# Patient Record
Sex: FEMALE | Race: WHITE | NOT HISPANIC OR LATINO | Employment: OTHER | URBAN - METROPOLITAN AREA
[De-identification: names, ages, dates, MRNs, and addresses within clinical notes are randomized per-mention and may not be internally consistent; named-entity substitution may affect disease eponyms.]

---

## 2018-02-13 ENCOUNTER — PROCEDURE VISIT CONVERTED (OUTPATIENT)
Dept: PODIATRY | Facility: CLINIC | Age: 58
End: 2018-02-13
Attending: PODIATRIST

## 2018-02-13 ENCOUNTER — CONVERSION ENCOUNTER (OUTPATIENT)
Dept: PODIATRY | Facility: CLINIC | Age: 58
End: 2018-02-13

## 2018-04-24 ENCOUNTER — CONVERSION ENCOUNTER (OUTPATIENT)
Dept: PODIATRY | Facility: CLINIC | Age: 58
End: 2018-04-24

## 2018-04-24 ENCOUNTER — PROCEDURE VISIT CONVERTED (OUTPATIENT)
Dept: PODIATRY | Facility: CLINIC | Age: 58
End: 2018-04-24
Attending: PODIATRIST

## 2018-07-11 ENCOUNTER — PROCEDURE VISIT CONVERTED (OUTPATIENT)
Dept: PODIATRY | Facility: CLINIC | Age: 58
End: 2018-07-11
Attending: PODIATRIST

## 2018-08-15 ENCOUNTER — CONVERSION ENCOUNTER (OUTPATIENT)
Dept: GASTROENTEROLOGY | Facility: CLINIC | Age: 58
End: 2018-08-15

## 2018-08-15 ENCOUNTER — OFFICE VISIT CONVERTED (OUTPATIENT)
Dept: GASTROENTEROLOGY | Facility: CLINIC | Age: 58
End: 2018-08-15
Attending: NURSE PRACTITIONER

## 2019-01-23 ENCOUNTER — PROCEDURE VISIT CONVERTED (OUTPATIENT)
Dept: PODIATRY | Facility: CLINIC | Age: 59
End: 2019-01-23
Attending: PODIATRIST

## 2019-08-29 ENCOUNTER — CONVERSION ENCOUNTER (OUTPATIENT)
Dept: FAMILY MEDICINE CLINIC | Facility: CLINIC | Age: 59
End: 2019-08-29

## 2019-08-29 ENCOUNTER — OFFICE VISIT CONVERTED (OUTPATIENT)
Dept: FAMILY MEDICINE CLINIC | Facility: CLINIC | Age: 59
End: 2019-08-29
Attending: FAMILY MEDICINE

## 2020-09-21 ENCOUNTER — OFFICE VISIT CONVERTED (OUTPATIENT)
Dept: FAMILY MEDICINE CLINIC | Facility: CLINIC | Age: 60
End: 2020-09-21
Attending: FAMILY MEDICINE

## 2020-09-22 ENCOUNTER — HOSPITAL ENCOUNTER (OUTPATIENT)
Dept: GENERAL RADIOLOGY | Facility: HOSPITAL | Age: 60
Discharge: HOME | End: 2020-09-22
Attending: FAMILY MEDICINE

## 2020-09-22 LAB
25(OH)D3 SERPL-MCNC: 43.3 NG/ML (ref 30–100)
ALBUMIN SERPL-MCNC: 4.1 G/DL (ref 3.5–5)
ALBUMIN/GLOB SERPL: 1.3 {RATIO} (ref 1.4–2.6)
ALP SERPL-CCNC: 86 U/L (ref 53–141)
ALT SERPL-CCNC: 14 U/L (ref 10–40)
ANION GAP SERPL CALC-SCNC: 16 MMOL/L (ref 8–19)
AST SERPL-CCNC: 38 U/L (ref 15–50)
BASOPHILS # BLD AUTO: 0.04 10*3/UL (ref 0–0.2)
BASOPHILS NFR BLD AUTO: 0.8 % (ref 0–3)
BILIRUB SERPL-MCNC: 0.3 MG/DL (ref 0.2–1.3)
BUN SERPL-MCNC: 18 MG/DL (ref 5–25)
BUN/CREAT SERPL: 16 {RATIO} (ref 6–20)
CALCIUM SERPL-MCNC: 9.1 MG/DL (ref 8.7–10.4)
CHLORIDE SERPL-SCNC: 97 MMOL/L (ref 99–111)
CHOLEST SERPL-MCNC: 197 MG/DL (ref 107–200)
CHOLEST/HDLC SERPL: 2.7 {RATIO} (ref 3–6)
CONV ABS IMM GRAN: 0.01 10*3/UL (ref 0–0.2)
CONV CO2: 25 MMOL/L (ref 22–32)
CONV IMMATURE GRAN: 0.2 % (ref 0–1.8)
CONV TOTAL PROTEIN: 7.2 G/DL (ref 6.3–8.2)
CREAT UR-MCNC: 1.14 MG/DL (ref 0.5–0.9)
DEPRECATED RDW RBC AUTO: 45.3 FL (ref 36.4–46.3)
EOSINOPHIL # BLD AUTO: 0.22 10*3/UL (ref 0–0.7)
EOSINOPHIL # BLD AUTO: 4.6 % (ref 0–7)
ERYTHROCYTE [DISTWIDTH] IN BLOOD BY AUTOMATED COUNT: 12.8 % (ref 11.7–14.4)
FOLATE SERPL-MCNC: >20 NG/ML (ref 4.8–20)
GFR SERPLBLD BASED ON 1.73 SQ M-ARVRAT: 52 ML/MIN/{1.73_M2}
GLOBULIN UR ELPH-MCNC: 3.1 G/DL (ref 2–3.5)
GLUCOSE SERPL-MCNC: 91 MG/DL (ref 65–99)
HCT VFR BLD AUTO: 42 % (ref 37–47)
HDLC SERPL-MCNC: 73 MG/DL (ref 40–60)
HGB BLD-MCNC: 13.4 G/DL (ref 12–16)
IRON SATN MFR SERPL: 23 % (ref 20–55)
IRON SERPL-MCNC: 66 UG/DL (ref 60–170)
LDLC SERPL CALC-MCNC: 112 MG/DL (ref 70–100)
LYMPHOCYTES # BLD AUTO: 0.68 10*3/UL (ref 1–5)
LYMPHOCYTES NFR BLD AUTO: 14.2 % (ref 20–45)
MCH RBC QN AUTO: 31.3 PG (ref 27–31)
MCHC RBC AUTO-ENTMCNC: 31.9 G/DL (ref 33–37)
MCV RBC AUTO: 98.1 FL (ref 81–99)
MONOCYTES # BLD AUTO: 0.33 10*3/UL (ref 0.2–1.2)
MONOCYTES NFR BLD AUTO: 6.9 % (ref 3–10)
NEUTROPHILS # BLD AUTO: 3.52 10*3/UL (ref 2–8)
NEUTROPHILS NFR BLD AUTO: 73.3 % (ref 30–85)
NRBC CBCN: 0 % (ref 0–0.7)
OSMOLALITY SERPL CALC.SUM OF ELEC: 277 MOSM/KG (ref 273–304)
PLATELET # BLD AUTO: 208 10*3/UL (ref 130–400)
PMV BLD AUTO: 9.8 FL (ref 9.4–12.3)
POTASSIUM SERPL-SCNC: 4.6 MMOL/L (ref 3.5–5.3)
RBC # BLD AUTO: 4.28 10*6/UL (ref 4.2–5.4)
SODIUM SERPL-SCNC: 133 MMOL/L (ref 135–147)
T4 FREE SERPL-MCNC: 0.7 NG/DL (ref 0.9–1.8)
TIBC SERPL-MCNC: 285 UG/DL (ref 245–450)
TRANSFERRIN SERPL-MCNC: 199 MG/DL (ref 250–380)
TRIGL SERPL-MCNC: 60 MG/DL (ref 40–150)
TSH SERPL-ACNC: 1.53 M[IU]/L (ref 0.27–4.2)
VIT B12 SERPL-MCNC: 342 PG/ML (ref 211–911)
VLDLC SERPL-MCNC: 12 MG/DL (ref 5–37)
WBC # BLD AUTO: 4.8 10*3/UL (ref 4.8–10.8)

## 2021-01-25 ENCOUNTER — HOSPITAL ENCOUNTER (OUTPATIENT)
Dept: OTHER | Facility: HOSPITAL | Age: 61
Discharge: HOME | End: 2021-01-25
Attending: INTERNAL MEDICINE

## 2021-02-18 ENCOUNTER — HOSPITAL ENCOUNTER (OUTPATIENT)
Dept: VACCINE CLINIC | Facility: HOSPITAL | Age: 61
Discharge: HOME | End: 2021-02-18
Attending: INTERNAL MEDICINE

## 2021-04-14 ENCOUNTER — OFFICE VISIT CONVERTED (OUTPATIENT)
Dept: FAMILY MEDICINE CLINIC | Facility: CLINIC | Age: 61
End: 2021-04-14
Attending: NURSE PRACTITIONER

## 2021-04-14 ENCOUNTER — HOSPITAL ENCOUNTER (OUTPATIENT)
Dept: GENERAL RADIOLOGY | Facility: HOSPITAL | Age: 61
Discharge: HOME | End: 2021-04-14
Attending: NURSE PRACTITIONER

## 2021-04-20 ENCOUNTER — OFFICE VISIT CONVERTED (OUTPATIENT)
Dept: ORTHOPEDIC SURGERY | Facility: CLINIC | Age: 61
End: 2021-04-20
Attending: ORTHOPAEDIC SURGERY

## 2021-05-04 ENCOUNTER — CONVERSION ENCOUNTER (OUTPATIENT)
Dept: ORTHOPEDIC SURGERY | Facility: CLINIC | Age: 61
End: 2021-05-04

## 2021-05-11 NOTE — H&P
History and Physical      Patient Name: Gela Michaud   Patient ID: [id number]   Sex: Female   YOB: 1960    Primary Care Provider: Tonie Duran DO   Referring Provider: Tonie Duran DO    Visit Date: April 20, 2021    Provider: Avelino Reardon MD   Location: [facility]   Location Address: [address] [place] [address]  [id number]   Location Phone: [phone]          Chief Complaint  · Left Hand Injury      History Of Present Illness  Gela Michaud is a 61 year old /White female who presents today to [facility].      Patient presents today for an evaluation of left hand. Patient reports she had fallen when trying to getting on the transportation bus resulting in her injuring her left hand/wrist. Patient injured her wrist/hand on 4/13/21. She is present today with her adult foster daughter. Her adult foster daughter brought her to her primary care and obtained x-rays. The foster daughter states that the patient wasn't provided with a splint or wrap so she wrapped her hand herself.       Past Medical History  Abdominal bloating; Anemia; Anxiety; Foot pain, left; Foot pain, right; High blood pressure; High cholesterol; Hyperthyroidism; Ingrowing toenail; Mental Retardation; Mood disorder; Neurologic disorder; Seizure; Thyroid Problems; Tinea unguium         Past Surgical History  Colonoscopy; Endoscopy; Hysterectomy-Abdominal         Medication List  Celexa 20 mg oral tablet; Cogentin 2 mg/2 mL injection solution;  MG CAPS 100 CAP; FERROUS GLUCONATE 324 MG  (38 FE) Tablet; folic acid 1 mg oral tablet; Haldol 10 mg Oral; iron 325 mg (65 mg iron) oral tablet; Lipitor 20 mg oral tablet; lisinopril 5 mg oral tablet; Miralax 17 gram/dose oral powder; OMEPRAZOLE 20 MG CPDR 20 Capsule; Oscal + D3 500 mg; Oyster Shell Calcium 500 mg calcium (1,250 mg) oral tablet; POLYETHYLENE GLYCOL 3350 PO Powder; Senokot 8.6 mg oral tablet; Trileptal 300 mg oral tablet;  "Tylenol 8 Hour 650 mg oral tablet extended release; Vitamin B-12 1,000 mcg oral tablet; vitamin B12-folic acid 1,000-400 mcg sublingual lozenge; Vitamin D3 25 mcg (1,000 unit) oral tablet; Zyprexa 5 mg oral tablet         Allergy List  Ativan       Allergies Reconciled  Family Medical History  -Father's Family History Unknown; -Mother's Family History Unknown         Social History  Alcohol (Never); Caffeine (Current some day); Lives with; Second hand smoke exposure (Never); Tobacco (Never)         Immunizations  Name Date Admin   Influenza 09/21/2020   Influenza Refused   Influenza Refused         Review of Systems  · Constitutional  o Denies  o : fever, chills, weight loss  · Cardiovascular  o Denies  o : chest pain, shortness of breath  · Gastrointestinal  o Denies  o : liver disease, heartburn, nausea, blood in stools  · Genitourinary  o Denies  o : painful urination, blood in urine  · Integument  o Denies  o : rash, itching  · Neurologic  o Denies  o : headache, weakness, loss of consciousness  · Musculoskeletal  o Denies  o : painful, swollen joints  · Psychiatric  o Denies  o : drug/alcohol addiction, anxiety, depression      Vitals  Date Time BP Position Site L\R Cuff Size HR RR TEMP (F) WT  HT  BMI kg/m2 BSA m2 O2 Sat FR L/min FiO2 HC       04/20/2021 08:18 AM      76 - R   175lbs 16oz 5'  4\" 30.21 1.9 99 %            Physical Examination  · Constitutional  o Appearance  o : well developed, well-nourished, no obvious deformities present  · Head and Face  o Head  o :   § Inspection  § : normocephalic  o Face  o :   § Inspection  § : no facial lesions  · Eyes  o Conjunctivae  o : conjunctivae normal  o Sclerae  o : sclerae white  · Ears, Nose, Mouth and Throat  o Ears  o :   § External Ears  § : appearance within normal limits  § Hearing  § : intact  o Nose  o :   § External Nose  § : appearance normal  · Neck  o Inspection/Palpation  o : normal appearance  o Range of Motion  o : full range of " motion  · Respiratory  o Respiratory Effort  o : breathing unlabored  o Inspection of Chest  o : normal appearance  o Auscultation of Lungs  o : no audible wheezing or rales  · Cardiovascular  o Heart  o : regular rate  · Gastrointestinal  o Abdominal Examination  o : soft and non-tender  · Skin and Subcutaneous Tissue  o General Inspection  o : intact, no rashes  · Psychiatric  o General  o : Alert and oriented x3  o Judgement and Insight  o : judgment and insight intact  o Mood and Affect  o : mood normal, affect appropriate  · Left Wrist  o Inspection  o : Swelling. Sensation grossly intact. Neurovascular intact. Skin intact. No skin discoloration or atrophy. Brisk capillary refill. Non-tender elbow. Radial pulse 2+, ulnar pulse 2+. No angulation.   · Casting  o Extremity  o : Left Hand, Short Arm Cast  o Procedure  o : Closed treatment was obtained and fiberglass cast was applied. The patient tolerated the procedure without any complications  · Imaging  o Imaging  o : 4/14/21 X-RAY [LEFT HAND/WRIST] 1. Nondisplaced fractures of the index and middle finger metacarpal necks. 2. Nondisplaced fracture at the ulnar and dorsal base of the thumb proximal phalanx. 3. Tiny calcification at the ulnar distal aspect of the ulnar styloid, possible small avulsion fracture fragment.           Assessment  · Left 2nd, 3rd and thumb metacarpal fracture     815.00  · Arthralgia of left hand     719.44/M25.542      Plan  · Orders  o Casting Supplies (Short Arm) Adult () - - 04/20/2021   NB  o Application of short arm cast (37144) - - 04/20/2021   NB  · Medications  o Medications have been Reconciled  o Transition of Care or Provider Policy  · Instructions  o Dr. Reardon saw and examined the patient and agrees with plan.   o X-rays reviewed by Dr. Reardon.  o Reviewed the patient's Past Medical, Social, and Family history as well as the ROS at today's visit, no changes.  o Call or return if worsening symptoms.  o Follow Up in 2  weeks.  o Discussed treatment plans and diagnosis with the patient. Patient was placed into a cast. Patient and daughter educated on cast care. Patient will get repeat films next visit.   o The above service was scribed by Nancie Tracey on my behalf and I attest to the accuracy of the note. mc            Electronically Signed by: Nancie Tracey-, Other -Author on April 22, 2021 10:58:18 AM  Electronically Co-signed by: Avelino Reardon MD -Reviewer on April 22, 2021 10:19:46 PM

## 2021-05-13 NOTE — PROGRESS NOTES
Progress Note      Patient Name: Gela Michaud   Patient ID: [id number]   Sex: Female   YOB: 1960    Primary Care Provider: Tonie Duran DO   Referring Provider: Tonie Duran DO    Visit Date: September 21, 2020    Provider: Tonie Duran DO   Location: [facility]   Location Address: [address] [place] [address] [address]  [place], [place]  [id number]   Location Phone: [phone]          Chief Complaint  · follow up   · physical       History Of Present Illness  Gela Michaud is a 60 year old /White female who presents for evaluation and treatment of:      She presents today for a follow-up for the management of her chronic medical conditions.  She is accompanied by Yas her caregiver.  The patient has MMR.  She also has a past medical history significant for anemia, anxiety, hyperlipidemia, hyperthyroidism, mood disorder and history of seizures.    She is current on her pap smears.     Her last colonoscopy was in 2015 with no issues.    Mammogram is due.    She sees Dr Chisholm for her anemia. Her most recent hemoglobin was over 12.      She has no complaints today.     She is managed by [facility] in [facility] for her psych needs.      She has no other complaints today and denies CP, SOB, cough, wheeze, fatigue, N/V/D, dizziness or syncope.            Past Medical History  Disease Name Date Onset Notes   Abdominal bloating --  --    Anemia --  --    Anxiety --  --    Foot pain, left --  --    Foot pain, right --  --    High blood pressure --  --    High cholesterol --  --    Hyperthyroidism --  --    Ingrowing toenail --  --    Mental Retardation --  --    Mood disorder --  --    Neurologic disorder --  --    Seizure --  --    Thyroid Problems --  --    Tinea unguium --  --          Past Surgical History  Procedure Name Date Notes   Colonoscopy 2018 --    Endoscopy 2018 --    Hysterectomy-Abdominal --  --          Medication List  Name Date Started  Instructions   Celexa 20 mg oral tablet 01/07/2020 take 1 tablet by oral route 2 times a day for 30 days   Cogentin 2 mg/2 mL injection solution  inject 1 milliliter (1 mg) by intramuscular route once daily   Colace 100 mg oral capsule 06/05/2020 take 1 capsule by oral route 2 times a day for 90 days   ferrous gluconate 324 mg (38 mg iron) oral tablet 09/03/2020 take 1 tablet by oral route daily for 90 days   folic acid 1 mg oral tablet  take 1 tablet (1 mg) by oral route once daily   Haldol 10 mg Oral  1 tab 2 times per day   iron 325 mg (65 mg iron) oral tablet  take 1 tablet (325 mg) by oral route 2 times per day   Lipitor 20 mg oral tablet 09/03/2020 take 1 tablet by oral route daily for 90 days   lisinopril 5 mg oral tablet 09/03/2020 take 2 tablets (10 mg) by oral route once daily for 90 days   Miralax 17 gram/dose oral powder 05/22/2020 take 17 gram mixed with 8 oz. water, juice, soda, coffee or tea by oral route once daily for 30 days   omeprazole 20 mg oral capsule,delayed release(DR/EC) 03/02/2020 take 1 capsule (20 mg) by oral route once daily before a meal for 90 days   Oscal + D3 500 mg  take one tab daily   Oyster Shell Calcium 500 mg calcium (1,250 mg) oral tablet 09/03/2020 take 1 tablet by oral route once   Senokot 8.6 mg oral tablet 05/08/2020 take 1 tablet by oral route twice   Synthroid 50 mcg oral tablet 09/23/2020 Take 1 tab po every day.   Trileptal 300 mg oral tablet  take 1 tablet (300 mg) by oral route 2 times per day   Vitamin B-12 1,000 mcg oral tablet 09/25/2020 take 1 tablet by oral route once   vitamin B12-folic acid 1,000-400 mcg sublingual lozenge 09/23/2020 dissolve 1 lozenge by sublingual route daily for 30 days   Vitamin D3 25 mcg (1,000 unit) oral tablet 07/17/2020 take 1 tablet by oral route once for 90 days   Zyprexa 5 mg oral tablet  take 1 tablet (5 mg) by oral route once daily         Allergy List  Allergen Name Date Reaction Notes   Ativan --  --  --          Family Medical  "History  Disease Name Relative/Age Notes   -Father's Family History Unknown Father/   Father   -Mother's Family History Unknown Mother/   Mother         Social History  Finding Status Start/Stop Quantity Notes   Alcohol Never 0/0 --  drinks no   Caffeine Current some day 0/0 --  drinks occasionally; coffee and soft drinks; 1-2 times per day   Lives with --  --/-- --  adult foster mother    Second hand smoke exposure Never 0/0 --  no   Tobacco Never 0/0 --  never a smoker         Immunizations  NameDate Admin Mfg Trade Name Lot Number Route Inj VIS Given VIS Publication   Utguaudlr30/21/2020 PMC Fluarix, quadrivalent, preservative free [id number] IM RD 09/21/2020    Comments: NDC#[id number]. Patient tolerated well.   InfluenzaRefused 10/02/2018 SKB Fluarix, quadrivalent, preservative free [id number] [facility] 08/29/2019    Comments:    InfluenzaRefused 10/02/2018 SK Fluarix, quadrivalent, preservative free [id number] [facility] 08/29/2019    Comments:          Review of Systems  · Constitutional  o * See HPI  · HENT  o * See HPI  · Cardiovascular  o * See HPI  · Respiratory  o * See HPI  · Gastrointestinal  o * See HPI  · Integument  o * See HPI  · Neurologic  o * See HPI  · Musculoskeletal  o * See HPI  · Endocrine  o * See HPI      Vitals  Date Time BP Position Site L\R Cuff Size HR RR TEMP (F) WT  HT  BMI kg/m2 BSA m2 O2 Sat        09/21/2020 02:28 /79 Sitting    71 - R 18 97.8 170lbs 2oz 4'  10\" 35.56 1.78 99 %          Physical Examination  · Constitutional  o Appearance  o : obese, well developed, alert, no obvious deformities present  · Head and Face  o Head  o :   § Inspection  § : atraumatic, normocephalic  · Ears, Nose, Mouth and Throat  o Ears  o :   § External Ears  § : normal  o Nose  o :   § Intranasal Exam  § : nares patent  o Oral Cavity  o :   § Oral Mucosa  § : moist mucous membranes  · Respiratory  o Respiratory Effort  o : breathing comfortably, symmetric chest rise  o Auscultation of Lungs  o : clear to " asculatation bilaterally, no wheezes, rales, or rhonchii  · Cardiovascular  o Heart  o :   § Auscultation of Heart  § : regular rate and rhythm, no murmurs, rubs, or gallops  o Peripheral Vascular System  o :   § Extremities  § : no edema  · Skin and Subcutaneous Tissue  o General Inspection  o : no rashes present, no lesions present, no areas of discoloration, skin turgor normal, texture normal  · Neurologic  o Mental Status Examination  o :   § Orientation  § : grossly oriented to person, place and time  o Cranial Nerves  o : cranial nerves intact bilaterally  o Gait and Station  o :   § Gait Screening  § : normal gait  · Psychiatric  o General  o : normal mood and affect              Assessment  · Fatigue     780.79/R53.83  She was given lab orders that are in the chart to be managed according to findings.   · Hyperlipidemia     272.4/E78.5  She was given a lab order for a today for a lipid profile to be managed according to findings.   · Hypothyroidism     244.9/E03.9  She was given a lab order for a thyroid profile to be managed according to findings.   · Vitamin D deficiency     268.9/E55.9  · Screening for depression     V79.0/Z13.89  · Visit for screening mammogram     V76.12/Z12.31  · Iron deficiency anemia     280.9/D50.9  She was given a lab order for evaluation of her iron level.   · Adult BMI 35.0-35.9 kg/sq m     V85.35/Z68.35  Her caregiver was encouraged to reduce her caloric intake.       Plan  · Orders  o Screening Mammography; Bilateral 3D (30235, 69622, ) - V76.12/Z12.31 - 09/21/2020  o Free T4 (86962) - 244.9/E03.9 - 09/21/2020  o Physical, Primary Care Panel (CBC, CMP, Lipid, TSH) [facility] (91139, 62091, 57130, 01464) - 280.9/D50.9, 272.4/E78.5, 244.9/E03.9 - 09/21/2020  o Vitamin D (25-Hydroxy) Level (84639) - 268.9/E55.9, 780.79/R53.83 - 09/21/2020  o ACO-39: Current medications updated and reviewed () - - 09/21/2020  o ACO-14: Influenza immunization administered or previously received  () - - 09/21/2020  o ACO-19: Colorectal cancer screening results documented and reviewed (3017F) - - 09/21/2020  o Iron Profile (Iron 34687 TIBC 22390 and Transferrin 27924) (IRONP) - 280.9/D50.9 - 09/21/2020  o B12 Folate levels (B12FO) - 780.79/R53.83 - 09/21/2020  · Medications  o Medications have been Reconciled  o Transition of Care or Provider Policy  · Instructions  o Advised that cheeses and other sources of dairy fats, animal fats, fast food, and the extras (candy, pastries, pies, doughnuts and cookies) all contain LDL raising nutrients. Advised to increase fruits, vegetables, whole grains, and to monitor portion sizes.   o Depression Screen completed and scanned into the EMR under the designated folder within the patient's documents.  o Patient was educated/instructed on their diagnosis, treatment and medications prior to discharge from the clinic today.  · Disposition  o Follow up in 6 months            Electronically Signed by: Tonie Duran DO - on September 27, 2020 11:58:16 AM

## 2021-05-14 VITALS
DIASTOLIC BLOOD PRESSURE: 79 MMHG | TEMPERATURE: 97.8 F | BODY MASS INDEX: 35.71 KG/M2 | HEIGHT: 58 IN | WEIGHT: 170.12 LBS | OXYGEN SATURATION: 99 % | HEART RATE: 71 BPM | RESPIRATION RATE: 18 BRPM | SYSTOLIC BLOOD PRESSURE: 154 MMHG

## 2021-05-14 VITALS
TEMPERATURE: 98 F | DIASTOLIC BLOOD PRESSURE: 61 MMHG | OXYGEN SATURATION: 98 % | HEIGHT: 58 IN | WEIGHT: 175.31 LBS | BODY MASS INDEX: 36.8 KG/M2 | HEART RATE: 64 BPM | RESPIRATION RATE: 20 BRPM | SYSTOLIC BLOOD PRESSURE: 146 MMHG

## 2021-05-14 VITALS — HEIGHT: 64 IN | WEIGHT: 176 LBS | OXYGEN SATURATION: 99 % | HEART RATE: 76 BPM | BODY MASS INDEX: 30.05 KG/M2

## 2021-05-14 NOTE — PROGRESS NOTES
Progress Note      Patient Name: Gela Michaud   Patient ID: [id number]   Sex: Female   YOB: 1960    Primary Care Provider: Tonie Duran DO   Referring Provider: Tonie Duran DO    Visit Date: April 14, 2021    Provider: SHADI Langston   Location: [facility]   Location Address: [address] [place] [address]  [id number]   Location Phone: [phone]          Chief Complaint  · Left hand swollen after a fall on 04/13/2021.      History Of Present Illness  Gela Michaud is a 61 year old /White female who presents for evaluation and treatment of:      Pt presents with caregiver with swollen hand/wrist d/t fall 1 days ago. Caregiver states that patient apparently fell while getting on bus for work yesterday. Caregiver did not notice until patient was getting dressed for work this morning. Pt is developmentally delayed. Denies any other injuries or falls. Denies pain in any other area. Denies hitting head.       Past Medical History  Disease Name Date Onset Notes   Abdominal bloating --  --    Anemia --  --    Anxiety --  --    Foot pain, left --  --    Foot pain, right --  --    High blood pressure --  --    High cholesterol --  --    Hyperthyroidism --  --    Ingrowing toenail --  --    Mental Retardation --  --    Mood disorder --  --    Neurologic disorder --  --    Seizure --  --    Thyroid Problems --  --    Tinea unguium --  --          Past Surgical History  Procedure Name Date Notes   Colonoscopy 2018 --    Endoscopy 2018 --    Hysterectomy-Abdominal --  --          Medication List  Name Date Started Instructions   Celexa 20 mg oral tablet 01/07/2020 take 1 tablet by oral route 2 times a day for 30 days   Cogentin 2 mg/2 mL injection solution  inject 1 milliliter (1 mg) by intramuscular route once daily    MG CAPS 100 CAP 11/02/2020 TAKE ONE CAPSULE BY MOUTH TWICE A DAY   FERROUS GLUCONATE 324 MG  (38 FE) Tablet 03/29/2021 TAKE ONE  TABLET BY MOUTH ONCE DAILY   folic acid 1 mg oral tablet  take 1 tablet (1 mg) by oral route once daily   Haldol 10 mg Oral  1 tab 2 times per day   iron 325 mg (65 mg iron) oral tablet  take 1 tablet (325 mg) by oral route 2 times per day   Lipitor 20 mg oral tablet 09/03/2020 take 1 tablet by oral route daily for 90 days   lisinopril 5 mg oral tablet 09/03/2020 take 2 tablets (10 mg) by oral route once daily for 90 days   Miralax 17 gram/dose oral powder 12/01/2020 take 17 gram mixed with 8 oz. water, juice, soda, coffee or tea by oral route once daily for 30 days   OMEPRAZOLE 20 MG CPDR 20 Capsule 11/30/2020 Take 1 capsule po 1 x day before meal   Oscal + D3 500 mg  take one tab daily   Oyster Shell Calcium 500 mg calcium (1,250 mg) oral tablet 09/03/2020 take 1 tablet by oral route once   POLYETHYLENE GLYCOL 3350 PO Powder 01/06/2021 TAKE 17 GRAM MIXED WITH 8 OZ. WATER, JUICE, SODA, COFFEE OR TEA BY ORAL ROUTE ONCE DAILY FOR 30 DAYS   Senokot 8.6 mg oral tablet 05/08/2020 take 1 tablet by oral route twice   Trileptal 300 mg oral tablet  take 1 tablet (300 mg) by oral route 2 times per day   Vitamin B-12 1,000 mcg oral tablet 09/25/2020 take 1 tablet by oral route once   vitamin B12-folic acid 1,000-400 mcg sublingual lozenge 09/23/2020 dissolve 1 lozenge by sublingual route daily for 30 days   Vitamin D3 25 mcg (1,000 unit) oral tablet 01/18/2021 take 1 tablet by oral route once for 90 days   Zyprexa 5 mg oral tablet  take 1 tablet (5 mg) by oral route once daily         Allergy List  Allergen Name Date Reaction Notes   Ativan --  --  --        Allergies Reconciled  Family Medical History  Disease Name Relative/Age Notes   -Father's Family History Unknown Father/   Father   -Mother's Family History Unknown Mother/   Mother         Social History  Finding Status Start/Stop Quantity Notes   Alcohol Never 0/0 --  drinks no   Caffeine Current some day 0/0 --  drinks occasionally; coffee and soft drinks; 1-2 times  "per day   Lives with --  --/-- --  adult foster mother    Second hand smoke exposure Never 0/0 --  no   Tobacco Never 0/0 --  never a smoker         Immunizations  NameDate Admin Mfg Trade Name Lot Number Route Inj VIS Given VIS Publication   Onrhtkgrq32/21/2020 PMC Fluarix, quadrivalent, preservative free [id number] IM RD 09/21/2020    Comments: NDC#[id number]. Patient tolerated well.         Review of Systems  · Constitutional  o Denies  o : fever, fatigue, weight loss, weight gain  · HENT  o Denies  o : headaches, nasal congestion, sore throat  · Cardiovascular  o Denies  o : lower extremity edema, claudication, chest pressure, palpitations  · Respiratory  o Denies  o : shortness of breath, wheezing, cough, hemoptysis, dyspnea on exertion  · Gastrointestinal  o Denies  o : nausea, vomiting, diarrhea, constipation, abdominal pain  · Musculoskeletal  o Denies  o : muscle pain, back pain  · Psychiatric  o Denies  o : anxiety, depression, suicidal ideation, homicidal ideation      Vitals  Date Time BP Position Site L\R Cuff Size HR RR TEMP (F) WT  HT  BMI kg/m2 BSA m2 O2 Sat FR L/min FiO2        04/14/2021 02:49 /61 Sitting    64 - R 20 98 175lbs 5oz 4'  10\" 36.64 1.8 98 %  21%          Physical Examination  · Constitutional  o Appearance  o : no acute distress, well-nourished  · Head and Face  o Head  o :   § Inspection  § : atraumatic, normocephalic  o Face  o :   § Inspection  § : no facial lesions  · Eyes  o Eyes  o : extraocular movements intact, no scleral icterus, no conjunctival injection  · Ears, Nose, Mouth and Throat  o Ears  o :   § External Ears  § : normal  o Nose  o :   § Intranasal Exam  § : nares patent  o Oral Cavity  o :   § Oral Mucosa  § : moist mucous membranes  · Respiratory  o Respiratory Effort  o : breathing comfortably, symmetric chest rise  o Auscultation of Lungs  o : clear to asculatation bilaterally, no wheezes, rales, or rhonchii  · Cardiovascular  o Heart  o :   § Auscultation of " Heart  § : regular rate and rhythm, no murmurs, rubs, or gallops  o Peripheral Vascular System  o :   § Extremities  § : no edema  · Skin and Subcutaneous Tissue  o General Inspection  o : no lesions present, no areas of discoloration, skin turgor normal  · Neurologic  o Mental Status Examination  o :   § Orientation  § : grossly oriented to person, place and time  o Gait and Station  o :   § Gait Screening  § : normal gait  · Psychiatric  o General  o : normal mood and affect  · Left Wrist  o Inspection  o : swelling present   o Palpation  o : tenderness to palpation   · Left Hand  o Inspection  o : dorsal side diffuse edema present, no redness, no abrasions, capillary refill less than 5 seconds in all five nailbeds  o Palpation  o : tender to palpation               Assessment  · Screening for depression     V79.0/Z13.89  · Visit for screening mammogram     V76.12/Z12.31  · Left hand pain     729.5/M79.642  · Hand pain, left     729.5/M79.642  · Fall     E888.9/W19.XXXA  · Developmental delay     783.40/R62.50  · Left wrist pain     719.43/M25.532       L hand/wrist pain:  XR L hand/wrist  Tyl 1300mg PO q8hrs x 10 days  Increase rest of extremity   Ice 20 min TID prn   Immobilize as much as possible        Problems Reconciled  Plan  · Orders  o Screening Mammography; Bilateral 3D (35135, 10962, ) - V76.12/Z12.31 - 04/14/2021  o ACO-18: Negative screen for clinical depression using a standardized tool () - V79.0/Z13.89 - 04/14/2021  o ACO-14: Influenza immunization was not administered for reasons documented [facility] () - - 04/14/2021  o ACO-13: Fall Risk Screening with no falls in past year or only one fall without injury in the past year (1101F) - - 04/14/2021   One fall with injury to left hand.  o ACO-39: Current medications updated and reviewed (1159F, ) - - 04/14/2021  o Xray hand left [facility] Preferred View ([id number]) - 729.5/M79.642 - 04/14/2021  o Wrist (Left) 3 or more views X-Ray [facility] Preferred  View ([id number]) - 719.43/M25.532 - 04/14/2021  · Medications  o Tylenol 8 Hour 650 mg oral tablet extended release   SIG: take 2 tablets (1,300 mg) by oral route every 8 hours swallowing whole with water. Do not break, crush, dissolve and/or chew. for 10 days   DISP: (60) Tablet with 0 refills  Prescribed on 04/14/2021     o Medications have been Reconciled  o Transition of Care or Provider Policy  · Instructions  o Depression Screen completed and scanned into the EMR under the designated folder within the patient's documents.  o Today's PHQ-9 result is _0__  o The provider screening met the required time of 15 minutes.  o Take all medications as prescribed/directed.  o Patient instructed/educated on their diet and exercise program.  o Patient was educated/instructed on their diagnosis, treatment and medications prior to discharge from the clinic today.  o Patient instructed to seek medical attention urgently for new or worsening symptoms.  o Call the office with any concerns or questions.  o Bring all medicines with their bottles to each office visit.  o Risks, benefits, and alternatives were discussed with the patient. The patient is aware of risks associated with: all meds and conditions.  o Chronic conditions reviewed and taken into consideration for today's treatment plan.  o Flu vaccine declined.  o Pneumonia vaccine declined.   o Discussed Covid-19 precautions including, but not limited to, social distancing, avoid touching your face, and hand washing.   · Disposition  o Call or Return if symptoms worsen or persist.  o Follow Up PRN.  o Proceed to ED for all medical emergencies.            Electronically Signed by: SHADI Langston -Author on April 14, 2021 03:36:50 PM

## 2021-05-15 VITALS
BODY MASS INDEX: 32.12 KG/M2 | OXYGEN SATURATION: 100 % | HEIGHT: 58 IN | HEART RATE: 63 BPM | DIASTOLIC BLOOD PRESSURE: 75 MMHG | SYSTOLIC BLOOD PRESSURE: 121 MMHG | WEIGHT: 153 LBS

## 2021-05-15 VITALS
WEIGHT: 162.37 LBS | HEART RATE: 57 BPM | OXYGEN SATURATION: 98 % | TEMPERATURE: 97.6 F | DIASTOLIC BLOOD PRESSURE: 91 MMHG | RESPIRATION RATE: 18 BRPM | SYSTOLIC BLOOD PRESSURE: 139 MMHG | BODY MASS INDEX: 34.08 KG/M2 | HEIGHT: 58 IN

## 2021-05-16 VITALS — WEIGHT: 168 LBS | HEIGHT: 58 IN | OXYGEN SATURATION: 100 % | BODY MASS INDEX: 35.26 KG/M2 | HEART RATE: 69 BPM

## 2021-05-16 VITALS
BODY MASS INDEX: 30.55 KG/M2 | WEIGHT: 166 LBS | TEMPERATURE: 96.8 F | HEIGHT: 62 IN | DIASTOLIC BLOOD PRESSURE: 72 MMHG | SYSTOLIC BLOOD PRESSURE: 136 MMHG | HEART RATE: 60 BPM

## 2021-05-16 VITALS — OXYGEN SATURATION: 95 % | HEIGHT: 58 IN | WEIGHT: 172 LBS | HEART RATE: 76 BPM | BODY MASS INDEX: 36.11 KG/M2

## 2021-05-16 VITALS — HEART RATE: 72 BPM | BODY MASS INDEX: 34.85 KG/M2 | OXYGEN SATURATION: 96 % | HEIGHT: 58 IN | WEIGHT: 166 LBS

## 2021-05-18 ENCOUNTER — OFFICE VISIT CONVERTED (OUTPATIENT)
Dept: ORTHOPEDIC SURGERY | Facility: CLINIC | Age: 61
End: 2021-05-18
Attending: PHYSICIAN ASSISTANT

## 2021-05-22 ENCOUNTER — TRANSCRIBE ORDERS (OUTPATIENT)
Dept: ADMINISTRATIVE | Facility: HOSPITAL | Age: 61
End: 2021-05-22

## 2021-05-22 DIAGNOSIS — Z12.31 OTHER SCREENING MAMMOGRAM: Primary | ICD-10-CM

## 2021-06-02 ENCOUNTER — HOSPITAL ENCOUNTER (INPATIENT)
Dept: TELEMETRY | Facility: HOSPITAL | Age: 61
LOS: 5 days | Discharge: HOME | End: 2021-06-07
Attending: GENERAL PRACTICE | Admitting: INTERNAL MEDICINE

## 2021-06-02 LAB
ALBUMIN SERPL-MCNC: 3.5 G/DL (ref 3.5–5)
ALBUMIN/GLOB SERPL: 1.1 {RATIO} (ref 1.4–2.6)
ALP SERPL-CCNC: 154 U/L (ref 43–160)
ALT SERPL-CCNC: 152 U/L (ref 10–40)
ANION GAP SERPL CALC-SCNC: 13 MMOL/L (ref 8–19)
APPEARANCE UR: ABNORMAL
AST SERPL-CCNC: 200 U/L (ref 15–50)
BASOPHILS # BLD AUTO: 0.03 10*3/UL (ref 0–0.2)
BASOPHILS NFR BLD AUTO: 0.3 % (ref 0–3)
BILIRUB SERPL-MCNC: 0.34 MG/DL (ref 0.2–1.3)
BILIRUB UR QL: NEGATIVE
BUN SERPL-MCNC: 27 MG/DL (ref 5–25)
BUN/CREAT SERPL: 17 {RATIO} (ref 6–20)
CALCIUM SERPL-MCNC: 9 MG/DL (ref 8.7–10.4)
CHLORIDE SERPL-SCNC: 93 MMOL/L (ref 99–111)
COLOR UR: YELLOW
CONV ABS IMM GRAN: 1.15 10*3/UL (ref 0–0.2)
CONV BACTERIA: ABNORMAL
CONV CO2: 24 MMOL/L (ref 22–32)
CONV COLLECTION SOURCE (UA): ABNORMAL
CONV IMMATURE GRAN: 10.7 % (ref 0–1.8)
CONV TOTAL PROTEIN: 6.8 G/DL (ref 6.3–8.2)
CONV UROBILINOGEN IN URINE BY AUTOMATED TEST STRIP: 1 {EHRLICHU}/DL (ref 0.1–1)
CREAT UR-MCNC: 1.63 MG/DL (ref 0.5–0.9)
D-LACTATE SERPL-SCNC: 1.5 MMOL/L (ref 0.7–2.1)
DEPRECATED RDW RBC AUTO: 45.1 FL (ref 36.4–46.3)
EOSINOPHIL # BLD AUTO: 0.01 10*3/UL (ref 0–0.7)
EOSINOPHIL # BLD AUTO: 0.1 % (ref 0–7)
ERYTHROCYTE [DISTWIDTH] IN BLOOD BY AUTOMATED COUNT: 13.2 % (ref 11.7–14.4)
GFR SERPLBLD BASED ON 1.73 SQ M-ARVRAT: 34 ML/MIN/{1.73_M2}
GLOBULIN UR ELPH-MCNC: 3.3 G/DL (ref 2–3.5)
GLUCOSE BLD-MCNC: 146 MG/DL (ref 65–99)
GLUCOSE SERPL-MCNC: 148 MG/DL (ref 65–99)
GLUCOSE UR QL: NEGATIVE MG/DL
HCT VFR BLD AUTO: 35.9 % (ref 37–47)
HGB BLD-MCNC: 12 G/DL (ref 12–16)
HGB UR QL STRIP: ABNORMAL
INR PPP: 0.87 (ref 2–3)
KETONES UR QL STRIP: NEGATIVE MG/DL
LEUKOCYTE ESTERASE UR QL STRIP: ABNORMAL
LYMPHOCYTES # BLD AUTO: 0.24 10*3/UL (ref 1–5)
LYMPHOCYTES NFR BLD AUTO: 2.2 % (ref 20–45)
MCH RBC QN AUTO: 31 PG (ref 27–31)
MCHC RBC AUTO-ENTMCNC: 33.4 G/DL (ref 33–37)
MCV RBC AUTO: 92.8 FL (ref 81–99)
MONOCYTES # BLD AUTO: 0.55 10*3/UL (ref 0.2–1.2)
MONOCYTES NFR BLD AUTO: 5.1 % (ref 3–10)
NEUTROPHILS # BLD AUTO: 8.81 10*3/UL (ref 2–8)
NEUTROPHILS NFR BLD AUTO: 81.6 % (ref 30–85)
NITRITE UR QL STRIP: NEGATIVE
NRBC CBCN: 0 % (ref 0–0.7)
OSMOLALITY SERPL CALC.SUM OF ELEC: 270 MOSM/KG (ref 273–304)
PH UR STRIP.AUTO: 5.5 [PH] (ref 5–8)
PLATELET # BLD AUTO: 133 10*3/UL (ref 130–400)
PMV BLD AUTO: 10.4 FL (ref 9.4–12.3)
POTASSIUM SERPL-SCNC: 4.3 MMOL/L (ref 3.5–5.3)
PROT UR QL: 30 MG/DL
PROTHROMBIN TIME: 9.8 S (ref 9.4–12)
RBC # BLD AUTO: 3.87 10*6/UL (ref 4.2–5.4)
RBC #/AREA URNS HPF: ABNORMAL /[HPF]
SODIUM SERPL-SCNC: 126 MMOL/L (ref 135–147)
SP GR UR: 1.01 (ref 1–1.03)
WBC # BLD AUTO: 10.79 10*3/UL (ref 4.8–10.8)
WBC #/AREA URNS HPF: ABNORMAL /[HPF]

## 2021-06-02 PROCEDURE — 9990

## 2021-06-03 LAB
ANION GAP SERPL CALC-SCNC: 17 MMOL/L (ref 8–19)
APAP SERPL-MCNC: <5 UG/ML (ref 10–30)
BASOPHILS # BLD AUTO: 0.03 10*3/UL (ref 0–0.2)
BASOPHILS NFR BLD AUTO: 0.4 % (ref 0–3)
BUN SERPL-MCNC: 23 MG/DL (ref 5–25)
BUN/CREAT SERPL: 17 {RATIO} (ref 6–20)
CALCIUM SERPL-MCNC: 8.3 MG/DL (ref 8.7–10.4)
CHLORIDE SERPL-SCNC: 97 MMOL/L (ref 99–111)
CONV ABS IMM GRAN: 0.15 10*3/UL (ref 0–0.2)
CONV CO2: 21 MMOL/L (ref 22–32)
CONV IMMATURE GRAN: 1.9 % (ref 0–1.8)
CREAT UR-MCNC: 1.38 MG/DL (ref 0.5–0.9)
DEPRECATED RDW RBC AUTO: 44.2 FL (ref 36.4–46.3)
EOSINOPHIL # BLD AUTO: 0 % (ref 0–7)
EOSINOPHIL # BLD AUTO: 0 10*3/UL (ref 0–0.7)
ERYTHROCYTE [DISTWIDTH] IN BLOOD BY AUTOMATED COUNT: 13 % (ref 11.7–14.4)
GFR SERPLBLD BASED ON 1.73 SQ M-ARVRAT: 41 ML/MIN/{1.73_M2}
GLUCOSE SERPL-MCNC: 133 MG/DL (ref 65–99)
HCT VFR BLD AUTO: 33.3 % (ref 37–47)
HGB BLD-MCNC: 11.3 G/DL (ref 12–16)
LYMPHOCYTES # BLD AUTO: 0.25 10*3/UL (ref 1–5)
LYMPHOCYTES NFR BLD AUTO: 3.1 % (ref 20–45)
Lab: NORMAL
MCH RBC QN AUTO: 31.1 PG (ref 27–31)
MCHC RBC AUTO-ENTMCNC: 33.9 G/DL (ref 33–37)
MCV RBC AUTO: 91.7 FL (ref 81–99)
MONOCYTES # BLD AUTO: 0.39 10*3/UL (ref 0.2–1.2)
MONOCYTES NFR BLD AUTO: 4.9 % (ref 3–10)
NEUTROPHILS # BLD AUTO: 7.17 10*3/UL (ref 2–8)
NEUTROPHILS NFR BLD AUTO: 89.7 % (ref 30–85)
NRBC CBCN: 0 % (ref 0–0.7)
OSMOLALITY SERPL CALC.SUM OF ELEC: 278 MOSM/KG (ref 273–304)
PLATELET # BLD AUTO: 115 10*3/UL (ref 130–400)
PMV BLD AUTO: 10.2 FL (ref 9.4–12.3)
POTASSIUM SERPL-SCNC: 4.3 MMOL/L (ref 3.5–5.3)
RBC # BLD AUTO: 3.63 10*6/UL (ref 4.2–5.4)
S PYO AG SPEC QL: NORMAL
SARS-COV-2 RNA SPEC QL NAA+PROBE: NOT DETECTED
SODIUM SERPL-SCNC: 131 MMOL/L (ref 135–147)
T4 FREE SERPL-MCNC: 1 NG/DL (ref 0.9–1.8)
TSH SERPL-ACNC: 1.11 M[IU]/L (ref 0.27–4.2)
WBC # BLD AUTO: 7.99 10*3/UL (ref 4.8–10.8)

## 2021-06-03 PROCEDURE — 9990

## 2021-06-03 PROCEDURE — G0480 DRUG TEST DEF 1-7 CLASSES: HCPCS

## 2021-06-03 PROCEDURE — U0003 INFECTIOUS AGENT DETECTION BY NUCLEIC ACID (DNA OR RNA); SEVERE ACUTE RESPIRATORY SYNDROME CORONAVIRUS 2 (SARS-COV-2) (CORONAVIRUS DISEASE [COVID-19]), AMPLIFIED PROBE TECHNIQUE, MAKING USE OF HIGH THROUGHPUT TECHNOLOGIES AS DESCRIBED BY CMS-2020-01-R: HCPCS

## 2021-06-04 PROBLEM — E78.00 HYPERCHOLESTEREMIA: Status: ACTIVE | Noted: 2021-06-04

## 2021-06-04 PROBLEM — R41.82 ALTERED MENTAL STATE: Status: ACTIVE | Noted: 2021-06-04

## 2021-06-04 PROBLEM — R51.9 HEADACHE: Status: ACTIVE | Noted: 2021-06-04

## 2021-06-04 PROBLEM — E03.9 ACQUIRED HYPOTHYROIDISM: Status: ACTIVE | Noted: 2021-06-04

## 2021-06-04 PROBLEM — K21.9 GERD (GASTROESOPHAGEAL REFLUX DISEASE): Status: ACTIVE | Noted: 2021-06-04

## 2021-06-04 PROBLEM — E87.1 HYPONATREMIA: Status: ACTIVE | Noted: 2021-06-04

## 2021-06-04 PROBLEM — G43.909 MIGRAINE HEADACHE: Status: ACTIVE | Noted: 2021-06-04

## 2021-06-04 PROBLEM — I10 ESSENTIAL HYPERTENSION: Status: ACTIVE | Noted: 2021-06-04

## 2021-06-04 PROBLEM — G40.909 SEIZURE DISORDER (HCC): Status: ACTIVE | Noted: 2021-06-04

## 2021-06-04 PROBLEM — N39.0 UTI (URINARY TRACT INFECTION): Status: ACTIVE | Noted: 2021-06-04

## 2021-06-04 PROBLEM — B17.9 ACUTE HEPATITIS: Status: ACTIVE | Noted: 2021-06-04

## 2021-06-04 LAB
ANION GAP SERPL CALC-SCNC: 14 MMOL/L (ref 8–19)
BASOPHILS # BLD AUTO: 0.03 10*3/UL (ref 0–0.2)
BASOPHILS NFR BLD AUTO: 0.6 % (ref 0–3)
BUN SERPL-MCNC: 15 MG/DL (ref 5–25)
BUN/CREAT SERPL: 14 {RATIO} (ref 6–20)
CALCIUM SERPL-MCNC: 8.2 MG/DL (ref 8.7–10.4)
CHLORIDE SERPL-SCNC: 103 MMOL/L (ref 99–111)
CONV ABS IMM GRAN: 0.02 10*3/UL (ref 0–0.2)
CONV CO2: 22 MMOL/L (ref 22–32)
CONV HEPATITIS B SURFACE AG W CONFIRMATION RE: NEGATIVE
CONV IMMATURE GRAN: 0.4 % (ref 0–1.8)
CREAT UR-MCNC: 1.09 MG/DL (ref 0.5–0.9)
DEPRECATED RDW RBC AUTO: 47.2 FL (ref 36.4–46.3)
EOSINOPHIL # BLD AUTO: 0.01 10*3/UL (ref 0–0.7)
EOSINOPHIL # BLD AUTO: 0.2 % (ref 0–7)
ERYTHROCYTE [DISTWIDTH] IN BLOOD BY AUTOMATED COUNT: 13.6 % (ref 11.7–14.4)
GFR SERPLBLD BASED ON 1.73 SQ M-ARVRAT: 55 ML/MIN/{1.73_M2}
GLUCOSE SERPL-MCNC: 98 MG/DL (ref 65–99)
HAV IGM SERPL QL IA: NEGATIVE
HBV CORE IGM SERPL QL IA: NEGATIVE
HCT VFR BLD AUTO: 32.3 % (ref 37–47)
HCV AB SER DONR QL: <0.1 S/CO RATIO (ref 0–0.9)
HGB BLD-MCNC: 10.7 G/DL (ref 12–16)
LYMPHOCYTES # BLD AUTO: 0.41 10*3/UL (ref 1–5)
LYMPHOCYTES NFR BLD AUTO: 8.1 % (ref 20–45)
MAGNESIUM SERPL-MCNC: 1.77 MG/DL (ref 1.6–2.3)
MCH RBC QN AUTO: 31.4 PG (ref 27–31)
MCHC RBC AUTO-ENTMCNC: 33.1 G/DL (ref 33–37)
MCV RBC AUTO: 94.7 FL (ref 81–99)
MONOCYTES # BLD AUTO: 0.51 10*3/UL (ref 0.2–1.2)
MONOCYTES NFR BLD AUTO: 10.1 % (ref 3–10)
NEUTROPHILS # BLD AUTO: 4.09 10*3/UL (ref 2–8)
NEUTROPHILS NFR BLD AUTO: 80.6 % (ref 30–85)
NRBC CBCN: 0 % (ref 0–0.7)
OSMOLALITY SERPL CALC.SUM OF ELEC: 281 MOSM/KG (ref 273–304)
PLATELET # BLD AUTO: 104 10*3/UL (ref 130–400)
PMV BLD AUTO: 10.3 FL (ref 9.4–12.3)
POTASSIUM SERPL-SCNC: 4.2 MMOL/L (ref 3.5–5.3)
RBC # BLD AUTO: 3.41 10*6/UL (ref 4.2–5.4)
SODIUM SERPL-SCNC: 135 MMOL/L (ref 135–147)
WBC # BLD AUTO: 5.07 10*3/UL (ref 4.8–10.8)

## 2021-06-04 PROCEDURE — 9990

## 2021-06-04 RX ORDER — LANOLIN ALCOHOL/MO/W.PET/CERES
1000 CREAM (GRAM) TOPICAL DAILY
COMMUNITY
End: 2021-06-14 | Stop reason: SDUPTHER

## 2021-06-04 RX ORDER — ATORVASTATIN CALCIUM 20 MG/1
20 TABLET, FILM COATED ORAL NIGHTLY
COMMUNITY
End: 2021-09-01 | Stop reason: SDUPTHER

## 2021-06-04 RX ORDER — FERROUS GLUCONATE 324(37.5)
324 TABLET ORAL DAILY
COMMUNITY
End: 2021-09-01 | Stop reason: SDUPTHER

## 2021-06-04 RX ORDER — SODIUM CHLORIDE 9 MG/ML
30 INJECTION, SOLUTION INTRAVENOUS EVERY MORNING
Status: DISCONTINUED | OUTPATIENT
Start: 2021-06-05 | End: 2021-06-07 | Stop reason: HOSPADM

## 2021-06-04 RX ORDER — FERROUS GLUCONATE 324(37.5)
324 TABLET ORAL
Status: DISCONTINUED | OUTPATIENT
Start: 2021-06-05 | End: 2021-06-07 | Stop reason: HOSPADM

## 2021-06-04 RX ORDER — LISINOPRIL 10 MG/1
10 TABLET ORAL DAILY
COMMUNITY
End: 2021-06-14 | Stop reason: SDUPTHER

## 2021-06-04 RX ORDER — LEVOTHYROXINE SODIUM 0.05 MG/1
50 TABLET ORAL
Status: DISCONTINUED | OUTPATIENT
Start: 2021-06-05 | End: 2021-06-07 | Stop reason: HOSPADM

## 2021-06-04 RX ORDER — DOCUSATE SODIUM 100 MG/1
100 CAPSULE, LIQUID FILLED ORAL 2 TIMES DAILY
COMMUNITY
End: 2021-11-04

## 2021-06-04 RX ORDER — SODIUM CHLORIDE 0.9 % (FLUSH) 0.9 %
3 SYRINGE (ML) INJECTION EVERY 12 HOURS SCHEDULED
Status: DISCONTINUED | OUTPATIENT
Start: 2021-06-05 | End: 2021-06-07 | Stop reason: HOSPADM

## 2021-06-04 RX ORDER — FOLIC ACID 1 MG/1
1 TABLET ORAL DAILY
Status: DISCONTINUED | OUTPATIENT
Start: 2021-06-05 | End: 2021-06-07 | Stop reason: HOSPADM

## 2021-06-04 RX ORDER — LEVOTHYROXINE SODIUM 0.05 MG/1
50 TABLET ORAL DAILY
COMMUNITY
End: 2021-06-21 | Stop reason: SDUPTHER

## 2021-06-04 RX ORDER — SODIUM CHLORIDE 0.9 % (FLUSH) 0.9 %
3 SYRINGE (ML) INJECTION AS NEEDED
Status: DISCONTINUED | OUTPATIENT
Start: 2021-06-05 | End: 2021-06-07 | Stop reason: HOSPADM

## 2021-06-04 RX ORDER — OLANZAPINE 5 MG/1
5 TABLET ORAL 2 TIMES DAILY
Status: DISCONTINUED | OUTPATIENT
Start: 2021-06-05 | End: 2021-06-07 | Stop reason: HOSPADM

## 2021-06-04 RX ORDER — DOCUSATE SODIUM 100 MG/1
100 CAPSULE, LIQUID FILLED ORAL 2 TIMES DAILY
Status: DISCONTINUED | OUTPATIENT
Start: 2021-06-05 | End: 2021-06-07 | Stop reason: HOSPADM

## 2021-06-04 RX ORDER — ONDANSETRON 2 MG/ML
4 INJECTION INTRAMUSCULAR; INTRAVENOUS EVERY 4 HOURS PRN
Status: DISCONTINUED | OUTPATIENT
Start: 2021-06-05 | End: 2021-06-07 | Stop reason: HOSPADM

## 2021-06-04 RX ORDER — OXCARBAZEPINE 300 MG/1
300 TABLET, FILM COATED ORAL 2 TIMES DAILY
COMMUNITY
End: 2021-07-14 | Stop reason: SDUPTHER

## 2021-06-04 RX ORDER — AMOXICILLIN 250 MG
1 CAPSULE ORAL 2 TIMES DAILY
COMMUNITY
End: 2021-09-27

## 2021-06-04 RX ORDER — CITALOPRAM 20 MG/1
20 TABLET ORAL DAILY
COMMUNITY

## 2021-06-04 RX ORDER — POLYETHYLENE GLYCOL 3350 17 G/17G
17 POWDER, FOR SOLUTION ORAL DAILY
COMMUNITY
End: 2021-09-01 | Stop reason: SDUPTHER

## 2021-06-04 RX ORDER — HALOPERIDOL 5 MG/1
10 TABLET ORAL 2 TIMES DAILY
Status: DISCONTINUED | OUTPATIENT
Start: 2021-06-05 | End: 2021-06-07 | Stop reason: HOSPADM

## 2021-06-04 RX ORDER — OLANZAPINE 5 MG/1
5 TABLET ORAL 2 TIMES DAILY
COMMUNITY

## 2021-06-04 RX ORDER — HALOPERIDOL 10 MG/1
10 TABLET ORAL 2 TIMES DAILY
COMMUNITY

## 2021-06-04 RX ORDER — CITALOPRAM 20 MG/1
20 TABLET ORAL DAILY
Status: DISCONTINUED | OUTPATIENT
Start: 2021-06-05 | End: 2021-06-07 | Stop reason: HOSPADM

## 2021-06-04 RX ORDER — FOLIC ACID 1 MG/1
1 TABLET ORAL DAILY
COMMUNITY
End: 2021-07-14 | Stop reason: SDUPTHER

## 2021-06-04 RX ORDER — ACETAMINOPHEN 500 MG
1000 TABLET ORAL EVERY 4 HOURS PRN
Status: DISCONTINUED | OUTPATIENT
Start: 2021-06-05 | End: 2021-06-07 | Stop reason: HOSPADM

## 2021-06-04 RX ORDER — MELATONIN
1000 DAILY
COMMUNITY
End: 2021-06-14 | Stop reason: SDUPTHER

## 2021-06-04 RX ORDER — OMEPRAZOLE 20 MG/1
20 CAPSULE, DELAYED RELEASE ORAL DAILY
COMMUNITY
End: 2022-05-09 | Stop reason: SDUPTHER

## 2021-06-04 RX ORDER — BENZTROPINE MESYLATE 1 MG/1
2 TABLET ORAL 2 TIMES DAILY
COMMUNITY

## 2021-06-04 RX ORDER — OXCARBAZEPINE 300 MG/1
600 TABLET, FILM COATED ORAL 2 TIMES DAILY
Status: DISCONTINUED | OUTPATIENT
Start: 2021-06-05 | End: 2021-06-07 | Stop reason: HOSPADM

## 2021-06-04 RX ORDER — CHOLECALCIFEROL (VITAMIN D3) 125 MCG
1000 CAPSULE ORAL DAILY
Status: DISCONTINUED | OUTPATIENT
Start: 2021-06-05 | End: 2021-06-07 | Stop reason: HOSPADM

## 2021-06-05 LAB
AMIKACIN SUSC ISLT: <=2
AMPICILLIN SUSC ISLT: <=2
AMPICILLIN SUSC ISLT: <=2
AMPICILLIN+SULBAC SUSC ISLT: <=2
AMPICILLIN+SULBAC SUSC ISLT: <=2
AZTREONAM SUSC ISLT: <=1
BACTERIA BLD CULT: ABNORMAL
BACTERIA BLD CULT: ABNORMAL
BACTERIA UR CULT: ABNORMAL
CEFAZOLIN SUSC ISLT: <=4
CEFAZOLIN SUSC ISLT: <=4
CEFEPIME SUSC ISLT: <=0.12
CEFEPIME SUSC ISLT: <=0.12
CEFOTAXIME SUSC ISLT: <=0.25
CEFOTETAN SUSC ISLT: <=4
CEFOXITIN SUSC ISLT: <=4
CEFPODOXIME SUSC ISLT: <=0.25
CEFTAZIDIME SUSC ISLT: <=1
CEFTAZIDIME SUSC ISLT: <=1
CEFTAZIDIME+AVIBACTAM ISLT MIC: <=0.12
CEFTRIAXONE SUSC ISLT: <=0.25
CEFUROXIME ORAL SUSC ISLT: 4
CEFUROXIME PARENTER SUSC ISLT: 4
CEPHALOTHIN SUSC ISLT: <=2
DEPRECATED RDW RBC AUTO: 48.3 FL (ref 37–54)
ERTAPENEM SUSC ISLT: <=0.12
ERTAPENEM SUSC ISLT: <=0.12
ERYTHROCYTE [DISTWIDTH] IN BLOOD BY AUTOMATED COUNT: 13.9 % (ref 12.3–15.4)
GENTAMICIN SUSC ISLT: <=1
GENTAMICIN SUSC ISLT: <=1
HCT VFR BLD AUTO: 31.5 % (ref 34–46.6)
HGB BLD-MCNC: 10.5 G/DL (ref 12–15.9)
LEVOFLOXACIN SUSC ISLT: <=0.12
LEVOFLOXACIN SUSC ISLT: <=0.12
MAGNESIUM SERPL-MCNC: 1.9 MG/DL (ref 1.6–2.4)
MCH RBC QN AUTO: 31.6 PG (ref 26.6–33)
MCHC RBC AUTO-ENTMCNC: 33.3 G/DL (ref 31.5–35.7)
MCV RBC AUTO: 94.9 FL (ref 79–97)
MEROPENEM SUSC ISLT: <=0.25
NALIDIXATE SUSC ISLT: 4
NITROFURANTOIN SUSC ISLT: <=16
PIP+TAZO SUSC ISLT: <=4
PIP+TAZO SUSC ISLT: <=4
PLATELET # BLD AUTO: 112 10*3/MM3 (ref 140–450)
PMV BLD AUTO: 10.4 FL (ref 6–12)
RBC # BLD AUTO: 3.32 10*6/MM3 (ref 3.77–5.28)
TETRACYCLINE SUSC ISLT: <=1
TIGECYCLINE SUSC ISLT: <=0.5
TMP SMX SUSC ISLT: <=20
TMP SMX SUSC ISLT: <=20
TOBRAMYCIN SUSC ISLT: <=1
TOBRAMYCIN SUSC ISLT: <=1
WBC # BLD AUTO: 3.72 10*3/MM3 (ref 3.4–10.8)

## 2021-06-05 PROCEDURE — 25010000002 MEROPENEM PER 100 MG: Performed by: INTERNAL MEDICINE

## 2021-06-05 PROCEDURE — 94799 UNLISTED PULMONARY SVC/PX: CPT

## 2021-06-05 PROCEDURE — 83735 ASSAY OF MAGNESIUM: CPT | Performed by: INTERNAL MEDICINE

## 2021-06-05 PROCEDURE — 25010000002 ENOXAPARIN PER 10 MG: Performed by: INTERNAL MEDICINE

## 2021-06-05 PROCEDURE — 99233 SBSQ HOSP IP/OBS HIGH 50: CPT | Performed by: INTERNAL MEDICINE

## 2021-06-05 PROCEDURE — 85027 COMPLETE CBC AUTOMATED: CPT | Performed by: INTERNAL MEDICINE

## 2021-06-05 RX ADMIN — FERROUS GLUCONATE TAB 324 MG (37.5 MG ELEMENTAL IRON) 324 MG: 324 (37.5 FE) TAB at 09:10

## 2021-06-05 RX ADMIN — CYANOCOBALAMIN TAB 500 MCG 1000 MCG: 500 TAB at 09:10

## 2021-06-05 RX ADMIN — HALOPERIDOL 10 MG: 5 TABLET ORAL at 20:14

## 2021-06-05 RX ADMIN — SODIUM CHLORIDE, PRESERVATIVE FREE 3 ML: 5 INJECTION INTRAVENOUS at 20:13

## 2021-06-05 RX ADMIN — LEVOTHYROXINE SODIUM 50 MCG: 50 TABLET ORAL at 06:31

## 2021-06-05 RX ADMIN — OLANZAPINE 5 MG: 5 TABLET, FILM COATED ORAL at 20:13

## 2021-06-05 RX ADMIN — DOCUSATE SODIUM 100 MG: 100 CAPSULE, LIQUID FILLED ORAL at 20:14

## 2021-06-05 RX ADMIN — ENOXAPARIN SODIUM 40 MG: 40 INJECTION SUBCUTANEOUS at 09:04

## 2021-06-05 RX ADMIN — OLANZAPINE 5 MG: 5 TABLET, FILM COATED ORAL at 09:08

## 2021-06-05 RX ADMIN — MEROPENEM 1 G: 1 INJECTION, POWDER, FOR SOLUTION INTRAVENOUS at 09:11

## 2021-06-05 RX ADMIN — FOLIC ACID 1 MG: 1 TABLET ORAL at 09:10

## 2021-06-05 RX ADMIN — ACETAMINOPHEN 1000 MG: 500 TABLET ORAL at 20:13

## 2021-06-05 RX ADMIN — HALOPERIDOL 10 MG: 5 TABLET ORAL at 09:09

## 2021-06-05 RX ADMIN — CITALOPRAM HYDROBROMIDE 20 MG: 20 TABLET ORAL at 09:09

## 2021-06-05 RX ADMIN — SODIUM CHLORIDE, PRESERVATIVE FREE 3 ML: 5 INJECTION INTRAVENOUS at 09:03

## 2021-06-05 RX ADMIN — SODIUM CHLORIDE 30 ML/HR: 9 INJECTION, SOLUTION INTRAVENOUS at 06:31

## 2021-06-05 RX ADMIN — MEROPENEM 1 G: 1 INJECTION, POWDER, FOR SOLUTION INTRAVENOUS at 17:34

## 2021-06-05 RX ADMIN — OXCARBAZEPINE 600 MG: 300 TABLET, FILM COATED ORAL at 20:14

## 2021-06-05 RX ADMIN — OXCARBAZEPINE 600 MG: 300 TABLET, FILM COATED ORAL at 09:09

## 2021-06-05 RX ADMIN — DOCUSATE SODIUM 100 MG: 100 CAPSULE, LIQUID FILLED ORAL at 09:08

## 2021-06-05 NOTE — ED PROVIDER NOTES
Patient: CONNIE BERGER     Acct: [id number]     Report: #[id number]  MR #:  [id number]     DOS: 2021 0007     : 1960  DICTATING: Elías Lopes  ***Signed***  --------------------------------------------------------------------------------------------------------------------       Patient: CONNIE BERGER  Clinical Report - Physicians/Mid Levels     MRN: [id number] [facility]    VisitID: [id number] [address] [place] [address]     [phone]    61y, F Registration Date/Time: 2021 21:15         Time Seen: 21:59 2021.      Arrived- By private vehicle.  Historian- family (foster care provider).           HISTORY OF PRESENT ILLNESS    Chief Complaint: FALL:.    (into bathtub).  Location of injuries- (NONE).  The injury occurred today.           Fell:  Occurred at home.           The patient denies pain.  No blow to the head, neck pain, loss of     consciousness or seizure.  Not dazed.           (Pt foster care provider states yesterday pt had a runny nose yesterday and     fatigued. Pt foster provider reports today at 7:10pm that she tried to walk     her to a chair and was leaning to one side and had her tongue sticking out     pretty far. Pt foster care provider had walked away to update her supervisor     when pt walked to the bathroom and fell in the tub. Pt foster care provider     notes that she's had episodes of slurred speech.).           REVIEW OF SYSTEMS    No urinary problems, chills, fatigue, fever or double vision.  No     photophobia, hearing loss, tinnitus, nasal congestion or runny nose.  No sore     throat, chest pain, cough, difficulty breathing or abdominal pain.  No     diarrhea, nausea, vomiting, abnormal bleeding or hematuria.  No back pain,     neck pain, laceration, skin rash or dizziness.  No fainting episodes,     headache, head injury, numbness or difficulty with urination.  No swelling.      The patient has had mildly  altered mental status reported by caregivers:     confused.  She has had new onset of generalized weakness.           PAST HISTORY    See nurses notes.  Pt has had her COVID-19 vaccine.           Problems:    Hypercholesterolemia.    Hypertension.    Hypothyroidism.    Gastroesophageal Reflux.    Headache.    Hypercholesterolemia.    Mental Illness.    Seizure Disorder.    Mental Retardation.    Migraine Headache.    Psychosis.           Additional Surgeries:    Hysterectomy.      Allergies:    Ativan.           SOCIAL HISTORY    Never smoker.  No alcohol use or drug use.  No recent travel.           FAMILY HISTORY    No significant family medical history.           ADDITIONAL NOTES    The nursing notes have been reviewed.           PHYSICAL EXAM    Vital Signs: 06/02/2021 22:00 HR: 90. RR: 22. O2 saturation: 95%.    06/02/2021 21:55 BP: 125/68. MAP: 87. HR: 84. RR: 19. O2 saturation: 95%.    06/02/2021 21:29 BP: sitting 106/69. MAP: 81. HR: 88. RR: 18. O2 saturation:     96%. Temp: 99.1 F. Pain level now: 0/10.  Have been reviewed.      Appearance: Alert.  No acute distress.  (oriented to self at baseline).      Head: Head non-tender.  No swelling of head.      Eyes: Pupils equal, round and reactive to light and light.      ENT: No dental injury.  Pharynx normal.      Neck: Painless ROM.  Non-tender.      CVS: Heart sounds normal.  Pulses normal.      Respiratory: Painless inspiration.  Breath sounds normal.  Chest nontender.      Abdomen: No visible injury.  Soft and nontender.  No organomegaly.  No mass.      Skin: Skin intact.  Skin warm and dry.  Normal skin color.      Extremities: Normal inspection.  Pelvis stable.  Extremities atraumatic.  No     lower extremity edema.      Neuro: Awake.  Alert.  Altered mental status: disoriented to place and time.     (at baseline).  Mood/affect normal.  Speech normal.  Cranial nerves II     through XII intact and normal (as tested).  No cerebellar findings.  No motor      deficit and deficit.  Moves all extremities equally.  No sensory deficit and     deficit.           LABS, X-RAYS, AND EKG    Laboratory Tests: Laboratory tests have been ordered, with results reviewed     and considered in the medical decision making process.       MRI Brain wo Cont:    (MARYSOL: 06/03/2021 03:17) ( [facility] 06/03/2021 03:17)     New Order          **Test**                                **Result**      **Flag**        **Units**      **(Reference)**     Reason for Exam:                                                                      Throat Screen for Strep:    (MARYSOL: 06/03/2021 00:10) ( [facility] 06/03/2021     00:38) Final results          **Test**                                **Result**      **Flag**        **Units**      **(Reference)**     Throat Screen for Strep      Negative for Beta Strep Group A     May Be Below Detectable Levels     Internal control Valid                                         Flu Swab (Influenzae A&B Ag):    (MARYSOL: 06/03/2021 00:10) ( [facility]     06/03/2021 00:47) Final results         Swab nares with foam tipped swab provided          by the lab.Bring to lab asap.          **Test**                                **Result**      **Flag**        **Units**      **(Reference)**     Influenza A and B      Negative for Flu A and B protein antigens.     To confirm negative results a culture should be performed.     Procedural Control Valid.                                         OT:    (MARYSOL: 06/02/2021 23:57) ( [facility] 06/02/2021 23:57) New Order          **Test**                                **Result**      **Flag**        **Units**      **(Reference)**     Chief Complaint                                                                       PT:    (MARYSOL: 06/02/2021 23:57) ( [facility] 06/02/2021 23:57) New Order          **Test**                                **Result**      **Flag**        **Units**      **(Reference)**     Chief Complaint                                                                        RESP:    (MARYSOL: 2021 23:57) ( MsgRcvd 2021 23:57) New Order         CT Head wo IV Cont:    (MARYSOL: 2021 22:55) ( MsgRcvd 2021 22:59)     Final results          **Exam**                                     --          [facility]                                       --              [facility]                                       --           PACS RADIOLOGY REPORT                                       --                                      -- Patient: CONNIE BERGER   Acct: #[id number]   Report: #[id number]           --                                      -- MR #:  [id number]    DOS: 21 2138                                         : 1960   -- INSURANCE:MEDICARE PART A   B   ORDER #:[id number]                                -- LOCATION:ER     : 1960                                       --                                      -- PROVIDERS                                       -- ADMITTING:     ATTENDING:                                        -- FAMILY:     ORDERING:  Elías Lopes                                           -- OTHER:    DICTATING:  Nikolay Graves MD, JR                                          --                                      -- REQ #:[id number]   EXAM:HDWO - CT HEAD without CONTRAST                             -- REASON FOR EXAM:  Altered Mental Status                                       -- REASON FOR VISIT:  BALANCE OFF, FATUGUE,FALL                                        --                                      -- *******Signed******                                          -- PROCEDURE:   CT HEAD WITHOUT CONTRAST                                       --                                      --  COMPARISON:   [facility], CT, HEAD W/O CONTRAST,     3/09/2012, 11:24.                                       --                                      --   INDICATIONS:   ALTERED MENTAL STATUS.  DIZZINESS.                                  --                                      --  PROTOCOL:     Standard imaging protocol performed                                  --                                      --  RADIATION:     DLP: 1018.2mGy*cm                                       --      MA and/or KV was adjusted to minimize radiation dose.                          --                                      --  TECHNIQUE:   After obtaining the patient's consent, 130 CT images were     obtained without non-ionic                                        --  intravenous contrast material.                                        --                                      --  DISCUSSION:     A routine nonenhanced head CT was performed.  No acute     brain abnormality is                                        --  identified.  No acute intracranial hemorrhage.  No acute infarct is     seen.  No acute skull fracture.                                     --                                      --   No midline shift or acute intracranial mass effect is seen.  The     ventricular size and                                        --  configuration are within normal limits and unchanged.  There may be     minimal chronic small vessel                                        --  ischemia/infarction.  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  Mild, probably chronic, congestive and/or     inflammatory change involves                                        --  the imaged bilateral paranasal sinuses.  There is chronic leftward nasal     septal deviation.  The                                        --  right mastoid air cell complex exhibits age-indeterminate congestive     and/or inflammatory change,                                        --  seen previously, and probably chronic in nature.  No acute  fracture is     seen in this region.  The                                        --  left middle ear cleft is well aerated.  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                                       --                                      --  CONCLUSION:     No acute brain abnormality is seen. Specifically, no     acute intracranial hemorrhage,                                     --                                      --  no acute infarct, no intracranial mass lesion, and no acute intracranial     mass effect are                                        --  appreciated.                                       --                                      --  RADHA CONNOLLY JR, MD                                              --  Electronically Signed and Approved By: RADHA CONNOLLY JR, MD on     6/02/2021 at 22:55                                          --                                      -- Until signed, this is an unconfirmed preliminary report that may contain            -- errors and is subject to change.                                       --                                      -- PAULINA:                                       -- D:06/02/21 2255                                           Lactate, Serum:    (MARYSOL: 06/02/2021 22:51) ( [facility]/02[facility] 23:37)     Final results          **Test**                                **Result**      **Flag**        **Units**      **(Reference)**     Lactic Acid Blood                       1.5                         mmol/L       (0.7-2.1)              Urinalysis with Microscopic:    (MARYSOL: 06/02/2021 22:40) ( MsgRcvd     06/02/2021 23:18) Final results          **Test**                                **Result**      **Flag**        **Units**      **(Reference)**     Collection Source (UA)                  Unknown                     NA                 Color                                   YELLOW                      NA                 Appearance                               TURBID          AB          NA                 Specific Gravity Urine                  1.013                       NA           (1.005-1.030)      pH                                      5.5                         pH UNIT      (5.0-8.0)          Protein Urine Screen                    30              AB          mg/dl        (NEGATIVE)         Glucose Urine                           NEGATIVE                    mg/dl        (NEGATIVE)         Ketones Urine                           NEGATIVE                    mg/dl        (NEGATIVE)         Bilirubin Urine Scr                     NEGATIVE                    NA           (NEGATIVE)         Occult Blood Urine                      TRACE           AB          NA           (NEGATIVE)         Nitrite Urine                           NEGATIVE                    NA           (NEGATIVE)         Urobilinogen Urine                      1.0                         EU/dl        (0.1-1.0)          Leukocyte Esterase                      LARGE           AB          NA           (NEGATIVE)         URINE MICROSCOPICS:  Only performed if any of the following are present:      Appearance is Sl Cloudy to Turbid; Protein is  30 to >/= 300 mg/dL; Occult     Blood, Nitrite, or Leukocyte  Esterase is positive. Color is Red or Orange.          WBC                                     TNTC(>51)       AB          /HPF               RBC                                     SMALL(6-10)     AB          /HPF               Bacteria                                4+              AB          NA                Glucose Capillary by Glucometer:    (MARYSOL: 06/02/2021 22:26) ( MsgRcvd     06/02/2021 22:26) Final results          **Test**                                **Result**      **Flag**        **Units**      **(Reference)**     Glucose by Point of Care Meter          146             H           mg/dl        (65-99)            Capillary by Point-of-Care meter         Chest 1V Port:     (MARYSOL: 2021 22:51) ( MsgRcvd 2021 22:55) Final     results          **Exam**                                     Reason for Exam:                                           SPECIAL INSTRUCTIONS:                                           Transport :      Portable                                     Chief Complaint                                           --          [facility]                                       --              [facility]                                       --           PACS RADIOLOGY REPORT                                       --                                      -- Patient: CONNIE BERGER   Acct: #[id number]   Report: #[id number]           --                                      -- MR #:  [id number]    DOS: 21                                         : 1960   -- INSURANCE:MEDICARE PART A   B   ORDER #:[id number]                               -- LOCATION:ER     : 1960                                       --                                      -- PROVIDERS                                       -- ADMITTING:     ATTENDING:                                        -- FAMILY:     ORDERING:  Elías Lopes                                           -- OTHER:    DICTATING:  Nikolay Graves MD, JR                                          --                                      -- REQ #:[id number]   EXAM:CXR1 - CHEST 1 View AP PA                                   -- REASON FOR EXAM:  neuro s/s protocol                                       -- REASON FOR VISIT:  BALANCE OFF, FATUGUE,FALL                                        --                                      -- *******Signed******                                          -- PROCEDURE:   CHEST AP/PA SINGLE VIEW                                       --                                      --  COMPARISON:   SANJAY Sherwood, CHEST PA/AP   LAT 2V, 2008, 12:17.            --                                      --  INDICATIONS:   NEUROLOGICAL DEFICITS, OFF BALANCE, FATIGUE.                        --                                      --  FINDINGS:   A single AP upright portable chest radiograph was performed.     There is pulmonary                                        --  hypoinflation.  There may be mild subsegmental atelectasis in the lung     bases.  No acute infiltrate                                        --  is seen.  No cardiac enlargement.  There is slight rightward deviation     of the trachea, which may be                                     --                                      --  related to vascular prominence and ectasia.  A substernal goiter cannot     be excluded entirely.                                         --  Gas-distended stomach and/or bowel (especially colon) is (are) seen in     the partially imaged upper                                        --  abdomen.  A moderate to large amount of formed stool is suspected in the     left colon.  Gas distended                                     --                                      --  bowel was seen on the prior exam within the upper abdomen.  No definite     pneumoperitoneum is                                        --  suspected.  No pneumothorax.                                       --                                      --  CONCLUSION:   No acute infiltrate is appreciated.  No cardiac     enlargement is suggested.                                       --                                      --  RADHA CONNOLLY JR, MD                                              --  Electronically Signed and Approved By: RADHA CONNOLLY JR, MD on     6/02/2021 at 22:50                                          --                                      -- Until signed, this is an unconfirmed preliminary report that may contain            -- errors and is subject to change.                                       --                                       -- PAULINA:                                       -- D:06/02/21 2251                                           EKG:    (MARYSOL: 06/02/2021 21:55) ( MsgRcvd 06/02/2021 21:56) New Order          **Exam**                                     Reason for Exam:                                           SPECIAL INSTRUCTIONS:                                           Transport :      Portable                                     Chief Complaint                                               CBC w Diff:    (MARYSOL: 06/02/2021 21:53) ( MsgRcvd 06/02/2021 22:34) Final     results          **Test**                                **Result**      **Flag**        **Units**      **(Reference)**     WBC                                     10.79                       K/ul         (4.80-10.80)       RBC                                     3.87            L           M/ul         (4.20-5.40)        HGB                                     12.0                        g/dl         (12.0-16.0)        HCT                                     35.9            L           %            (37.0-47.0)        MCV                                     92.8                        fl           (81.0-99.0)        MCH                                     31.0                        pcg          (27.0-31.0)        MCHC                                    33.4                        g/dL         (33.0-37.0)        RDW                                     13.2                        %            (11.7-14.4)        Platelets                               133                         K/ul         (130-400)          MPV                                     10.4                        fl           (9.4-12.3)         Gran percent                            81.6                        %            (30.0-85.0)        Lymphocytes percent                     2.2             L           %            (20.0-45.0)        Monocytes percent                        5.1                         %            (3.0-10.0)         Eosinophils percent                     0.1                         %            (0.0-7.0)          Basophils percent                       0.3                         %            (0.0-3.0)          Gran# (Neutrophil #)                    8.81            H           K/ul         (2.00-8.00)        Lymph#                                  0.24            L           K/ul         (1.00-5.00)        Monos#                                  0.55                        K/ul         (0.20-1.20)        Eos#                                    0.01                        K/ul         (0.00-0.70)        Baso#                                   0.03                        K/ul         (0.00-0.20)        Imm. Gran. percent                      10.7            H           %            (0.0-1.8)          Imm. Gran. #                            1.15            H           K/ul         (0.00-0.20)        RDW-SD                                  45.1                        fl           (36.4-46.3)        NRBC  percent                           0.00                        %            (0.00-0.70)            PT with INR:    (MARYSOL: 06/02/2021 21:53) ( [facility] 06/02/2021 22:45) Final     results          **Test**                                **Result**      **Flag**        **Units**      **(Reference)**     Prothrombin Time (PT)                   9.8                         Seconds      (9.4-12.0)         INR                                     0.87            L           NA           (2.00-3.00)        Suggested Therapeutic Ranges For Oral Anticoagulant Therapy:Level of Therapy     INR Target RangeStandard Dose                                2.0-3.0High Dose                                2.5-3.5Patients not receiving     anticoagulantTherapy Normal Range                     0.6-1.2         CMP:    (MARYSOL: 06/02/2021 21:53) ( [facility] 06/02/2021 22:52) Final results           **Test**                                **Result**      **Flag**        **Units**      **(Reference)**     Glucose                                 148             H           mg/dL        (65-99)            BUN                                     27              H           mg/dl        (5-25)             Creatinine                              1.63            H           mg/dl        (0.50-0.90)        Bun/Creat. Rat                          17                          Ratio        (6-20)             Glomerular Filt.Rate                    34              L               ml/min/1.73m2   (>60)              Interpretative Data:------------------------------------STAGE                    GFRStage 1                90 mL/min or greaterStage 2                60-89     mL/minStage 3                30-59 mL/minStage 4                15-29     mL/minValue <60 mL/min for 3 or more months is defined as CKD.          Sodium                                  126             L           mmol/L       (135-147)          Potassium                               4.3                         mmol/L       (3.5-5.3)          Chloride                                93              L           mmol/L       ()           Carbon Dioxide                          24                          mmol/L       (22-32)            Anion Gap                               13                          mmol/l       (8-19)             Osmolality Calc.                        270             L           mOsm/kg      (273-304)          Total Protein                           6.8                         g/dl         (6.3-8.2)          If Patient is receiving dextran as a blood volume expanderresult may show a     potential interference.          Albumin                                 3.5                         g/dl         (3.5-5.0)          Globulin                                3.3                         g/dl         (2.0-3.5)          A/G Ratio                                1.1             L           RATIO        (1.4-2.6)          Calcium                                 9.0                         mg/dl        (8.7-10.4)         Calcium value was calculated to correct for low albumin result.          Alkaline Phos.                          154                         U/L          ()           ALT (SGPT)                              152             H           U/L          (10-40)            AST (SGOT)                              200             H           U/L          (15-50)            Total Bilirubin                         0.34                        mg/dL        (0.20-1.30)            CT Head wo IV Cont:    (MARYSOL: 06/02/2021 22:18) ( MsgRcvd 06/02/2021 22:21)     In Progress          **Exam**                                     Reason for Exam:                                           SPECIAL INSTRUCTIONS:                                           Transport :      Stretcher                                     Chief Complaint                                               .           PROGRESS AND PROCEDURES    Course of Care: Patient presents emergency department with change in mental     status and weakness.  Vital signs remarkable for a fever of 101.  Labs showed     mildly elevated LFTs.  She also has sodium of 126 and a creatinine of 1.6.      Chest x-ray showed no acute finding.  UA consistent with urinary tract     infection.  CT of her head showed no acute finding.  Discussed patient with     hospitalist and she will be admitted for further care.  Patient is stable.      Symptoms better.           Patient/family counseled.           Disposition orders written.           Disposition: Admitted to telemetry.  Condition: stable.           CLINICAL IMPRESSION    Acute mental status change with confusion.      Generalized weakness.      Acute urinary tract infection with cystitis and hematuria.      Metabolic encephalopathy     Hyponatremia.            (Electronically signed by ANNA LOPES M.D. 06/03/2021 05:38)          Addenda for CONNIE BERGER    MRN: [id number]   VisitID:   [id number]       Date:    06/02/2021 6/2/2021 21:57    By signing my name below I, Mara Fagan, attest that this     documentation was prepared under the direction of Dr. Anna Lopes.    (Electronically signed by Mara Fagan  - 6/2/2021 21:57)         6/3/2021 5:37    The documentation recorded by the scribe accurately reflect the service I     personally performed and the decisions made by me.     (Electronically signed by ANNA LOPES M.D. - 6/3/2021 5:37)    Signed by Anna Lopes  06/03/2021 06:39     Disclaimer: Converted hospital document may not contain table formatting or lab diagrams. Please see [employer] for authenticated document.

## 2021-06-05 NOTE — PROGRESS NOTES
[facility]   Hospitalist Progress Note  Date: 2021  Patient Name: Gela Michaud  : 1960  MRN: [id number]  Date of admission: 2021      Subjective   Subjective     Chief Complaint: Change in mental status     Summary:   61-year-old female with developmental delay who was accompanied by her caregiver.  Patient's caregiver stated that patient has some difficulty the day prior to admission where she had a runny nose and overall did not feel well.  She tried to stand up the patient yesterday and patient became weak had difficulty walking had slurred speech.  Patient was sticking her tongue out.  Had ataxia and difficulty walking.  Patient cannot provide me with a history.  According to caregiver she walked away to speak to her supervisor and the patient walked into the tub and fell.  She presented to the emergency department initial work-up was consistent with a urinary tract infection patient also had mild hyponatremia and acute renal failure.  She had elevation of liver enzymes with an AST of 200 and an ALT of 152.  There was also a concern that one of the members of the team had been exposed to Covid.  Patient has obtained both immunizations for COVID-19.    Patient has been found to have a E. coli urinary tract infection as well as E. coli bacteremia.  Patient remains on IV antibiotics.  Patient's mental status appears back to normal.  Patient's white count is improving.  Patient continues to have a low-grade temperature at 100.  Repeat cultures are pending       Review of Systems  Limited due to mental retardation. Patient denies any pain, no nausea, vomiting or shortness of breath     Objective   Objective     Vitals:   Temp:  [98.4 °F (36.9 °C)-99.1 °F (37.3 °C)] 98.4 °F (36.9 °C)  Heart Rate:  [66-69] 66  Resp:  [18-20] 18  BP: (144-162)/() 144/83  Physical Exam     Constitutional:  Well developed, Well nourished; No Acute distress  HEENT:  Atraumatic, PERRLA, EOMI; No Scleral  icterus  Neck:  Supple  Cardiovascular:  RRR; No Murmurs, No Edema  Respiratory:  CTAB; No Diminished Breath Sounds, No Crackles, No Wheezes Bilaterally, No Rhonchi  Chest:  Normal appearance  Abdomen:  Normal BS all 4 Quadrants; No Tenderness  Neurological:  Alert+Ox3 (Alert and oriented to person only), CN II-XII Intact; No Weakness  Psychological:  Mood Normal, Affect Normal  Skin:  No Rash  Genitourinary:  No Aburto Catheter     Result Review    Result Review:  I have personally reviewed the results from the time of this admission to 6/5/2021 12:59 EDT and agree with these findings:  [x]  Laboratory  [x]  Microbiology  []  Radiology  []  EKG/Telemetry   []  Cardiology/Vascular   []  Pathology  []  Old records  []  Other:    Assessment/Plan   Assessment / Plan     Assessment/Plan:  Impression/Problem List  Metabolic encephalopathy  Sepsis secondary to E. coli urinary tract infection and E. coli bacteremia  Hyponatremia  Hypercholesterolemia.  Hypertension.  Transaminitis  Hypothyroidism.  Gastroesophageal Reflux.  Mental retardation  Seizure disorder    PLAN          Replace electrolytes as indicated   Continue meropenem   Repeat blood cultures today   Resume home medication   Patient's Covid screen was negative    Diet: Regular  DVT prophylaxis: Lovenox    CODE STATUS:   Code Status: CPR  Medical Interventions (Level of Support Prior to Arrest): Full        Electronically signed by Celso Beckwith DO, 06/05/21, 12:59 PM EDT.

## 2021-06-05 NOTE — H&P
Patient: CONNIE BERGER     Acct: [id number]     Report: #[id number]  MR #:  [id number]     DOS: 2021 0319     : 1960  DICTATING: Emilia Caceres  ***Signed***  --------------------------------------------------------------------------------------------------------------------        DATE: 6/3/21      Primary Care Provider      Tonie Duran      Chief Complaint      Altered mental status..            History of Present Illness      Patient is a pleasant 61-year-old female with developmental delay who was     accompanied by her caregiver.  She states that patient has some difficulty the     day prior to admission where she had a runny nose and overall did not feel well.     She tried to stand up the patient yesterday and patient became weak had     difficulty walking had slurred speech.  Patient was sticking her tongue out.      Had ataxia and difficulty walking.  Patient cannot provide me with a history.      According to caregiver she walked away to speak to her supervisor and the     patient walked into the tub and fell.  She presented to the emergency department    initial work-up was consistent with a urinary tract infection patient also had     mild hyponatremia and acute renal failure.  She had elevation of liver enzymes     with an AST of 200 and an ALT of 152.  There was also a concern that one of the     members of the team had been exposed to Covid.  Patient has obtained both     immunizations for COVID-19.            VTE Prophylaxis      VTE Prophylaxis ordered:  Yes      If not ordered:  Mechanical/Pharmacological            Past Medical History      Hypercholesterolemia      History of anemia      Hypothyroidism      Hypertension.      Hypothyroidism.      Gastroesophageal Reflux.      Headache.      Hypercholesterolemia.      Mental Illness.      Seizure Disorder.      Mental Retardation.      Migraine Headache.      Psychosis.               Additional Surgeries:      Hysterectomy.               SOCIAL HISTORY      Never smoker.  No alcohol use or drug use.  No recent travel.            Past Surgical History      Hysterectomy            Psychiatric History      Developmental disorder with explosive psychotic disorder.            Social History      Patient has developmental disease      She does not smoke      He does not drink alcohol      He does not do illicit drugs.            Family History      Noncontributory.            Allergies      Coded Allergies:             LORAZEPAM (Verified  Allergy, Unknown, unknown reaction, 5/10/18)            Home Medications      Home Meds      Active Scripts      Peg 3350/Na Sulf,Bicarb,Cl/Kcl (Golytely) 4,000 Ml Soln.recon, 4000 ML PO ASDIR,    #1 BOTTLE         Prov:LEAH RODRIGUEZ         5/3/18      Reported Medications      Ferrous Gluconate (Ferrous Gluconate*) 324 Mg Tablet, 325 MG PO QDAY, TAB         5/3/18      Lisinopril* (Zestril*) 5 Mg Tablet, 10 MG PO QDAY, #30 TAB 0 Refills         5/3/18      Citalopram HBr (CeleXA) 20 Mg Tablet, 20 MG PO QDAY, #30 TAB 0 Refills         5/3/18      Docusate Sodium (Colace) 100 Mg Capsule, 100 MG PO BID, #60 CAP 0 Refills         5/3/18      raNITIdine HCL (raNITIdine HCL) 150 Mg Tablet, 150 MG PO BID, #60 TAB 0 Refills         7/13/15      Aspirin Chew (Aspirin Baby) 81 Mg Tab.chew, 81 MG PO QDAY, #30 TAB.CHEW 0     Refills         7/13/15      Cholecalciferol (Vitamin D3) (Vitamin D3) 1,000 Units Capsule, 1000 UNITS PO     QDAY, #30 CAP 0 Refills         7/13/15      OLANZapine (zyPREXA) 5 Mg Tab, 5 MG PO BID         1 TAB         1/9/13      Calcium/Vitamin D (Os-Se 500+D Tablet) 1 Tab Tablet, 1 TAB PO BID         3/9/12      Senna (Senokot) 187 Mg Tab, 1 TAB PO BID, TAB 0 Refills         10/29/09      Atorvastatin Calcium (Lipitor*) 20 Mg Tablet, 1 TAB PO HS, TAB 0 Refills         10/29/09      Levothyroxine (Synthroid) 0.025 Mg Tablet, 1 TAB PO QAM, TAB 0 Refills         10/29/09      Folic Acid  (Folic Acid) 1 Mg Tablet, 1 MG PO QDAY, TAB         10/15/07      OXcarbazepine (Trileptal) 300 Mg Tab, 2 TAB PO BID, TAB         10/15/07      Haloperidol (Haldol) 10 Mg Tab, 1 TAB PO BID, TAB         10/15/07      Benztropine Mesylate (Cogentin) 1 Mg Tab, 1 MG PO BID, TAB         10/15/07            ROS      Other      Unable to obtain secondary to patient's altered mental status.      Other physical findings            General      Patient is pleasantly confused.       HEENT:  Atraumatic, PERRLA      Neck:  Supple, No Lymphadenopathy      Cardiovascular:  PMI      Lungs:  Normal Breath Sounds, No  Wheezes.       Abdomen:  Normal BS all 4 Quadrants, No Guarding, No Tenderness      Lymphatic:  No Neck, No Axillae, No Groin      Extremities:  Pulses Positive all 4 Ext, No Edema      Neurological:  Mental Status WNL, Alert, Oriented x 3, CN II-XII Intact,     Weakness      Psychological:  Mood Normal, Affect Normal      Skin:  No Cellulitis, No Erythema      Genitourinary:  No Bladder Distention      Lab Results                                      Laboratory Tests      6/2/21 21:53            Laboratory Tests      6/2/21 22:26: Capillary Blood Glucose (Chem) 146            Impression/Problem List      Altered mental status, with slurred speech and ataxia      Urinary tract infection      With a Bolick encephalopathy      Hyponatremia      Hypercholesterolemia.      Hypertension.      Acute hepatitis      Hypothyroidism.      Gastroesophageal Reflux.      Headache.      Mental Illness.      Seizure Disorder.      Mental Retardation.      Migraine Headache.            *IV hydration with lactated Ringer's      Monitor sodium      Urine electrolytes      Acute hepatitis panel      Acetaminophen level      Start Rocephin 1 g IV daily empirically for UTI      Obtain blood cultures and urine culture a Covid test was obtained in the     emergency department although patient has both Covid shots.      We will obtain an MRI  of the brain although I suspect most of his symptoms are     from metabolic encephalopathy from acute UTI.      We will obtain thyroid panels      Will obtain a bladder scan and urine electrolytes            Emilia Caceres                    Otilio 3, 2021 03:01      Electronically signed by Emilia Caceres  06/03/2021 03:20     Disclaimer: Converted hospital document may not contain table formatting or lab diagrams. Please see [employer] for authenticated document.

## 2021-06-05 NOTE — PROGRESS NOTES
Progress Note      Patient Name: Gela Michaud   Patient ID: [id number]   Sex: Female   YOB: 1960    Primary Care Provider: Tonie Duran DO   Referring Provider: Tonie Duran DO    Visit Date: May 18, 2021    Provider: Tia Michael PA-C   Location: [facility]   Location Address: [address] [place] [address]  [id number]   Location Phone: [phone]          Chief Complaint  · Follow up left hand injury      History Of Present Illness  Gela Michaud is a 61 year old /White female who presents today to [facility]. Patient presents today for 4-week follow-up of left second third and third metacarpal fracture. She has been in a cast the past 4 weeks. She presents today with an adult foster mother. Her caregiver states that she has tolerated the cast well. She denies any pain or numbness at this time.       Past Medical History  Abdominal bloating; Anemia; Anxiety; Foot pain, left; Foot pain, right; High blood pressure; High cholesterol; Hyperlipemia; Hypertension; Hyperthyroidism; Ingrowing toenail; Mental Retardation; Mood disorder; Neurologic disorder; Reflux; Seizure; Thyroid disorder; Thyroid Problems; Tinea unguium         Past Surgical History  Colonoscopy; Endoscopy; Hysterectomy; Hysterectomy-Abdominal         Medication List  Celexa 20 mg oral tablet; Cogentin 2 mg/2 mL injection solution;  MG CAPS 100 CAP; FERROUS GLUCONATE 324 MG  (38 FE) Tablet; folic acid 1 mg oral tablet; Haldol 10 mg Oral; iron 325 mg (65 mg iron) oral tablet; Lipitor 20 mg oral tablet; lisinopril 5 mg oral tablet; Miralax 17 gram/dose oral powder; OMEPRAZOLE 20 MG CPDR 20 Capsule; Oscal + D3 500 mg; Oyster Shell Calcium 500 mg calcium (1,250 mg) oral tablet; POLYETHYLENE GLYCOL 3350 PO Powder; Senokot 8.6 mg oral tablet; Trileptal 300 mg oral tablet; Tylenol 8 Hour 650 mg oral tablet extended release; Vitamin B-12 1,000 mcg oral tablet; vitamin B12-folic acid  "1,000-400 mcg sublingual lozenge; Vitamin D3 25 mcg (1,000 unit) oral tablet; Zyprexa 5 mg oral tablet         Allergy List  Ativan       Allergies Reconciled  Family Medical History  -Father's Family History Unknown; -Mother's Family History Unknown         Social History  Alcohol (Never); Alcohol Use (Never); Caffeine (Current some day); Lives with; lives with other; Recreational Drug Use (Never); Second hand smoke exposure (Never); Single.; Tobacco (Never); Unemployed.         Immunizations  Name Date Admin   Influenza 09/21/2020   Influenza Refused   Influenza Refused         Review of Systems  · Constitutional  o Denies  o : fever, chills, weight loss  · Cardiovascular  o Denies  o : chest pain, shortness of breath  · Gastrointestinal  o Denies  o : liver disease, heartburn, nausea, blood in stools  · Genitourinary  o Denies  o : painful urination, blood in urine  · Integument  o Denies  o : rash, itching  · Neurologic  o Denies  o : headache, weakness, loss of consciousness  · Musculoskeletal  o Denies  o : painful, swollen joints  · Psychiatric  o Denies  o : drug/alcohol addiction, anxiety, depression      Vitals  Date Time BP Position Site L\R Cuff Size HR RR TEMP (F) WT  HT  BMI kg/m2 BSA m2 O2 Sat FR L/min FiO2 HC       05/18/2021 03:54 PM      77 - R   172lbs 0oz 5'  3\" 30.47 1.86 97 %            Physical Examination  · Constitutional  o Appearance  o : well developed, well-nourished, no obvious deformities present  · Head and Face  o Head  o :   § Inspection  § : normocephalic  o Face  o :   § Inspection  § : no facial lesions  · Eyes  o Conjunctivae  o : conjunctivae normal  o Sclerae  o : sclerae white  · Ears, Nose, Mouth and Throat  o Ears  o :   § External Ears  § : appearance within normal limits  § Hearing  § : intact  o Nose  o :   § External Nose  § : appearance normal  · Neck  o Inspection/Palpation  o : normal appearance  o Range of Motion  o : full range of " motion  · Respiratory  o Respiratory Effort  o : breathing unlabored  o Inspection of Chest  o : normal appearance  o Auscultation of Lungs  o : no audible wheezing or rales  · Cardiovascular  o Heart  o : regular rate  · Gastrointestinal  o Abdominal Examination  o : soft and non-tender  · Skin and Subcutaneous Tissue  o General Inspection  o : intact, no rashes  · Psychiatric  o General  o : Alert and oriented x3  o Judgement and Insight  o : judgment and insight intact  o Mood and Affect  o : mood normal, affect appropriate  · Left Hand  o Inspection  o : Nontender at fracture site. No atrophy, swelling, discoloration or deformity. Limited AROM of the 2nd and 3rd digit. Patient able to make a fist, wiggle fingers. Sensation is intact. N/v intact. Radial and ulnar pulse are 2+.  · In Office Procedures  o View  o : AP/LATERAL  o Site  o : left, hand   o Indication  o : Left arm pain   o Study  o : X-rays ordered, taken in the office, and reviewed today.  o Xray  o : Healing 2nd, 3rd metacarpal fractures. Healing thumb proximal phalanx fracture.  · Splinting  o Extremity  o : left hand, mario loops  o Procedure  o : Patient was placed in mario loops.          Assessment  · Fracture: 2nd, 3rd Metacarpal     815.00  · Pain: Hand     719.44/M79.643      Plan  · Orders  o Application of finger splint; static (45364) - 719.44/M79.643 - 05/18/2021   Mario loops  o Hand (Left) [facility] (54085-RC) - 719.44/M79.643 - 05/18/2021  · Instructions  o Reviewed the patient's Past Medical, Social, and Family history as well as the ROS at today's visit, no changes.  o Call or return if worsening symptoms.  o X-ray ordered, taken and reviewed at this visit.  o Follow up in 3 months.  o Patient given brace today with mario tape of the 1st/2nd digit for protection. Recommend removing throughout the day to do gentle ROM of the wrist. We will see her in 3 weeks for repeat x-rays and evaluate ROM.            Electronically  Signed by: Tia Michael PA-C -Author on May 18, 2021 05:10:37 PM  Electronically Co-signed by: Avelino Reardon MD -Reviewer on May 19, 2021 06:26:21 AM

## 2021-06-06 LAB
ANION GAP SERPL CALCULATED.3IONS-SCNC: 8.9 MMOL/L (ref 5–15)
BASOPHILS # BLD AUTO: 0.04 10*3/MM3 (ref 0–0.2)
BASOPHILS NFR BLD AUTO: 0.7 % (ref 0–1.5)
BUN SERPL-MCNC: 18 MG/DL (ref 8–23)
BUN/CREAT SERPL: 17.8 (ref 7–25)
CALCIUM SPEC-SCNC: 8.6 MG/DL (ref 8.6–10.5)
CHLORIDE SERPL-SCNC: 100 MMOL/L (ref 98–107)
CO2 SERPL-SCNC: 26.1 MMOL/L (ref 22–29)
CREAT SERPL-MCNC: 1.01 MG/DL (ref 0.57–1)
DEPRECATED RDW RBC AUTO: 47.8 FL (ref 37–54)
EOSINOPHIL # BLD AUTO: 0.25 10*3/MM3 (ref 0–0.4)
EOSINOPHIL NFR BLD AUTO: 4.4 % (ref 0.3–6.2)
ERYTHROCYTE [DISTWIDTH] IN BLOOD BY AUTOMATED COUNT: 14 % (ref 12.3–15.4)
GFR SERPL CREATININE-BSD FRML MDRD: 56 ML/MIN/1.73
GLUCOSE SERPL-MCNC: 116 MG/DL (ref 65–99)
HCT VFR BLD AUTO: 33.7 % (ref 34–46.6)
HGB BLD-MCNC: 11 G/DL (ref 12–15.9)
IMM GRANULOCYTES # BLD AUTO: 0.08 10*3/MM3 (ref 0–0.05)
IMM GRANULOCYTES NFR BLD AUTO: 1.4 % (ref 0–0.5)
LYMPHOCYTES # BLD AUTO: 0.87 10*3/MM3 (ref 0.7–3.1)
LYMPHOCYTES NFR BLD AUTO: 15.5 % (ref 19.6–45.3)
MAGNESIUM SERPL-MCNC: 1.7 MG/DL (ref 1.6–2.4)
MCH RBC QN AUTO: 30.6 PG (ref 26.6–33)
MCHC RBC AUTO-ENTMCNC: 32.6 G/DL (ref 31.5–35.7)
MCV RBC AUTO: 93.6 FL (ref 79–97)
MONOCYTES # BLD AUTO: 0.93 10*3/MM3 (ref 0.1–0.9)
MONOCYTES NFR BLD AUTO: 16.5 % (ref 5–12)
NEUTROPHILS NFR BLD AUTO: 3.46 10*3/MM3 (ref 1.7–7)
NEUTROPHILS NFR BLD AUTO: 61.5 % (ref 42.7–76)
NRBC BLD AUTO-RTO: 0 /100 WBC (ref 0–0.2)
PLATELET # BLD AUTO: 143 10*3/MM3 (ref 140–450)
PMV BLD AUTO: 10 FL (ref 6–12)
POTASSIUM SERPL-SCNC: 4.1 MMOL/L (ref 3.5–5.2)
RBC # BLD AUTO: 3.6 10*6/MM3 (ref 3.77–5.28)
SODIUM SERPL-SCNC: 135 MMOL/L (ref 136–145)
WBC # BLD AUTO: 5.63 10*3/MM3 (ref 3.4–10.8)

## 2021-06-06 PROCEDURE — 25010000002 ENOXAPARIN PER 10 MG: Performed by: INTERNAL MEDICINE

## 2021-06-06 PROCEDURE — 25010000002 MEROPENEM PER 100 MG: Performed by: INTERNAL MEDICINE

## 2021-06-06 PROCEDURE — 83735 ASSAY OF MAGNESIUM: CPT | Performed by: INTERNAL MEDICINE

## 2021-06-06 PROCEDURE — 25010000002 LEVOFLOXACIN PER 250 MG: Performed by: INTERNAL MEDICINE

## 2021-06-06 PROCEDURE — 94799 UNLISTED PULMONARY SVC/PX: CPT

## 2021-06-06 PROCEDURE — 80048 BASIC METABOLIC PNL TOTAL CA: CPT | Performed by: INTERNAL MEDICINE

## 2021-06-06 PROCEDURE — 85025 COMPLETE CBC W/AUTO DIFF WBC: CPT | Performed by: INTERNAL MEDICINE

## 2021-06-06 PROCEDURE — 99233 SBSQ HOSP IP/OBS HIGH 50: CPT | Performed by: INTERNAL MEDICINE

## 2021-06-06 RX ORDER — LEVOFLOXACIN 5 MG/ML
750 INJECTION, SOLUTION INTRAVENOUS EVERY 24 HOURS
Status: DISCONTINUED | OUTPATIENT
Start: 2021-06-06 | End: 2021-06-07 | Stop reason: HOSPADM

## 2021-06-06 RX ADMIN — MEROPENEM 1 G: 1 INJECTION, POWDER, FOR SOLUTION INTRAVENOUS at 09:24

## 2021-06-06 RX ADMIN — CITALOPRAM HYDROBROMIDE 20 MG: 20 TABLET ORAL at 09:06

## 2021-06-06 RX ADMIN — SODIUM CHLORIDE, PRESERVATIVE FREE 3 ML: 5 INJECTION INTRAVENOUS at 09:08

## 2021-06-06 RX ADMIN — HALOPERIDOL 10 MG: 5 TABLET ORAL at 09:06

## 2021-06-06 RX ADMIN — OLANZAPINE 5 MG: 5 TABLET, FILM COATED ORAL at 20:26

## 2021-06-06 RX ADMIN — LEVOTHYROXINE SODIUM 50 MCG: 50 TABLET ORAL at 05:36

## 2021-06-06 RX ADMIN — OLANZAPINE 5 MG: 5 TABLET, FILM COATED ORAL at 09:06

## 2021-06-06 RX ADMIN — MEROPENEM 1 G: 1 INJECTION, POWDER, FOR SOLUTION INTRAVENOUS at 02:01

## 2021-06-06 RX ADMIN — SODIUM CHLORIDE, PRESERVATIVE FREE 3 ML: 5 INJECTION INTRAVENOUS at 20:26

## 2021-06-06 RX ADMIN — OXCARBAZEPINE 600 MG: 300 TABLET, FILM COATED ORAL at 09:06

## 2021-06-06 RX ADMIN — CYANOCOBALAMIN TAB 500 MCG 1000 MCG: 500 TAB at 09:06

## 2021-06-06 RX ADMIN — DOCUSATE SODIUM 100 MG: 100 CAPSULE, LIQUID FILLED ORAL at 20:26

## 2021-06-06 RX ADMIN — FERROUS GLUCONATE TAB 324 MG (37.5 MG ELEMENTAL IRON) 324 MG: 324 (37.5 FE) TAB at 09:06

## 2021-06-06 RX ADMIN — HALOPERIDOL 10 MG: 5 TABLET ORAL at 20:25

## 2021-06-06 RX ADMIN — SODIUM CHLORIDE 30 ML/HR: 9 INJECTION, SOLUTION INTRAVENOUS at 04:03

## 2021-06-06 RX ADMIN — OXCARBAZEPINE 600 MG: 300 TABLET, FILM COATED ORAL at 20:25

## 2021-06-06 RX ADMIN — FOLIC ACID 1 MG: 1 TABLET ORAL at 09:06

## 2021-06-06 RX ADMIN — LEVOFLOXACIN 750 MG: 750 INJECTION, SOLUTION INTRAVENOUS at 16:22

## 2021-06-06 RX ADMIN — DOCUSATE SODIUM 100 MG: 100 CAPSULE, LIQUID FILLED ORAL at 09:06

## 2021-06-06 RX ADMIN — ENOXAPARIN SODIUM 40 MG: 40 INJECTION SUBCUTANEOUS at 09:07

## 2021-06-06 NOTE — PROGRESS NOTES
[facility]   Hospitalist Progress Note  Date: 2021  Patient Name: Gela Michaud  : 1960  MRN: [id number]  Date of admission: 2021      Subjective   Subjective     Chief Complaint: Change in mental status     Summary:   61-year-old female with developmental delay who was accompanied by her caregiver.  Patient's caregiver stated that patient has some difficulty the day prior to admission where she had a runny nose and overall did not feel well.  She tried to stand up the patient yesterday and patient became weak had difficulty walking had slurred speech.  Patient was sticking her tongue out.  Had ataxia and difficulty walking.  Patient cannot provide me with a history.  According to caregiver she walked away to speak to her supervisor and the patient walked into the tub and fell.  She presented to the emergency department initial work-up was consistent with a urinary tract infection patient also had mild hyponatremia and acute renal failure.  She had elevation of liver enzymes with an AST of 200 and an ALT of 152.  There was also a concern that one of the members of the team had been exposed to Covid.  Patient has obtained both immunizations for COVID-19.    Patient has been found to have a E. coli urinary tract infection as well as E. coli bacteremia.  Patient remains on IV antibiotics.  Patient's mental status appears back to normal.  Patient has been afebrile.  Repeat blood culture is negative    Review of Systems  Limited due to mental retardation. Patient denies any pain, no nausea, vomiting or shortness of breath     Objective   Objective     Vitals:   Temp:  [97.6 °F (36.4 °C)-98.4 °F (36.9 °C)] 97.7 °F (36.5 °C)  Heart Rate:  [64-83] 66  Resp:  [18-20] 20  BP: (134-159)/(65-88) 145/65  Physical Exam     Constitutional:  Well developed, Well nourished; No Acute distress  HEENT:  Atraumatic, PERRLA, EOMI; No Scleral icterus  Neck:  Supple  Cardiovascular:  RRR; No Murmurs, No  Edema  Respiratory:  CTAB; No Diminished Breath Sounds, No Crackles, No Wheezes Bilaterally, No Rhonchi  Chest:  Normal appearance  Abdomen:  Normal BS all 4 Quadrants; No Tenderness  Neurological:  Alert+Ox3 (Alert and oriented to person only), CN II-XII Intact; No Weakness  Psychological:  Mood Normal, Affect Normal  Skin:  No Rash  Genitourinary:  No Aburto Catheter     Result Review    Result Review:  I have personally reviewed the results from the time of this admission to 6/6/2021 14:10 EDT and agree with these findings:  [x]  Laboratory  [x]  Microbiology  []  Radiology  []  EKG/Telemetry   []  Cardiology/Vascular   []  Pathology  []  Old records  []  Other:    Assessment/Plan   Assessment / Plan     Assessment/Plan:  Impression/Problem List  Metabolic encephalopathy  Sepsis secondary to E. coli urinary tract infection and E. coli bacteremia  Hyponatremia  Hypercholesterolemia.  Hypertension.  Transaminitis  Hypothyroidism.  Gastroesophageal Reflux.  Mental retardation  Seizure disorder    PLAN          Replace electrolytes as indicated   Change to Levaquin.    Repeat blood cultures so far are negative   Resume home medication   Patient's Covid screen was negative    Diet: Regular  DVT prophylaxis: Lovenox  Anticipate discharge back to her group home tomorrow    CODE STATUS:   Code Status: CPR  Medical Interventions (Level of Support Prior to Arrest): Full

## 2021-06-07 ENCOUNTER — READMISSION MANAGEMENT (OUTPATIENT)
Dept: CALL CENTER | Facility: HOSPITAL | Age: 61
End: 2021-06-07

## 2021-06-07 VITALS
HEIGHT: 64 IN | WEIGHT: 177.25 LBS | HEART RATE: 62 BPM | SYSTOLIC BLOOD PRESSURE: 138 MMHG | RESPIRATION RATE: 16 BRPM | DIASTOLIC BLOOD PRESSURE: 72 MMHG | OXYGEN SATURATION: 96 % | TEMPERATURE: 97.4 F | BODY MASS INDEX: 30.26 KG/M2

## 2021-06-07 PROBLEM — B96.20 BACTEREMIA DUE TO ESCHERICHIA COLI: Status: ACTIVE | Noted: 2021-06-07

## 2021-06-07 PROBLEM — R78.81 BACTEREMIA DUE TO ESCHERICHIA COLI: Status: ACTIVE | Noted: 2021-06-07

## 2021-06-07 LAB
ANION GAP SERPL CALCULATED.3IONS-SCNC: 9.6 MMOL/L (ref 5–15)
BASOPHILS # BLD AUTO: 0.06 10*3/MM3 (ref 0–0.2)
BASOPHILS NFR BLD AUTO: 1 % (ref 0–1.5)
BUN SERPL-MCNC: 16 MG/DL (ref 8–23)
BUN/CREAT SERPL: 15.8 (ref 7–25)
CALCIUM SPEC-SCNC: 8.7 MG/DL (ref 8.6–10.5)
CHLORIDE SERPL-SCNC: 97 MMOL/L (ref 98–107)
CO2 SERPL-SCNC: 23.4 MMOL/L (ref 22–29)
CREAT SERPL-MCNC: 1.01 MG/DL (ref 0.57–1)
DEPRECATED RDW RBC AUTO: 47.8 FL (ref 37–54)
EOSINOPHIL # BLD AUTO: 0.34 10*3/MM3 (ref 0–0.4)
EOSINOPHIL NFR BLD AUTO: 5.6 % (ref 0.3–6.2)
ERYTHROCYTE [DISTWIDTH] IN BLOOD BY AUTOMATED COUNT: 13.8 % (ref 12.3–15.4)
GFR SERPL CREATININE-BSD FRML MDRD: 56 ML/MIN/1.73
GLUCOSE SERPL-MCNC: 118 MG/DL (ref 65–99)
HCT VFR BLD AUTO: 34 % (ref 34–46.6)
HGB BLD-MCNC: 11.2 G/DL (ref 12–15.9)
IMM GRANULOCYTES # BLD AUTO: 0.21 10*3/MM3 (ref 0–0.05)
IMM GRANULOCYTES NFR BLD AUTO: 3.5 % (ref 0–0.5)
LYMPHOCYTES # BLD AUTO: 1.25 10*3/MM3 (ref 0.7–3.1)
LYMPHOCYTES NFR BLD AUTO: 20.6 % (ref 19.6–45.3)
MAGNESIUM SERPL-MCNC: 1.9 MG/DL (ref 1.6–2.4)
MCH RBC QN AUTO: 31 PG (ref 26.6–33)
MCHC RBC AUTO-ENTMCNC: 32.9 G/DL (ref 31.5–35.7)
MCV RBC AUTO: 94.2 FL (ref 79–97)
MONOCYTES # BLD AUTO: 0.82 10*3/MM3 (ref 0.1–0.9)
MONOCYTES NFR BLD AUTO: 13.5 % (ref 5–12)
NEUTROPHILS NFR BLD AUTO: 3.38 10*3/MM3 (ref 1.7–7)
NEUTROPHILS NFR BLD AUTO: 55.8 % (ref 42.7–76)
NRBC BLD AUTO-RTO: 0 /100 WBC (ref 0–0.2)
PLATELET # BLD AUTO: 184 10*3/MM3 (ref 140–450)
PMV BLD AUTO: 9.9 FL (ref 6–12)
POTASSIUM SERPL-SCNC: 4.4 MMOL/L (ref 3.5–5.2)
RBC # BLD AUTO: 3.61 10*6/MM3 (ref 3.77–5.28)
SODIUM SERPL-SCNC: 130 MMOL/L (ref 136–145)
WBC # BLD AUTO: 6.06 10*3/MM3 (ref 3.4–10.8)

## 2021-06-07 PROCEDURE — 85025 COMPLETE CBC W/AUTO DIFF WBC: CPT | Performed by: INTERNAL MEDICINE

## 2021-06-07 PROCEDURE — 94799 UNLISTED PULMONARY SVC/PX: CPT

## 2021-06-07 PROCEDURE — 80048 BASIC METABOLIC PNL TOTAL CA: CPT | Performed by: INTERNAL MEDICINE

## 2021-06-07 PROCEDURE — 99239 HOSP IP/OBS DSCHRG MGMT >30: CPT | Performed by: INTERNAL MEDICINE

## 2021-06-07 PROCEDURE — 83735 ASSAY OF MAGNESIUM: CPT | Performed by: INTERNAL MEDICINE

## 2021-06-07 PROCEDURE — 25010000002 ENOXAPARIN PER 10 MG: Performed by: INTERNAL MEDICINE

## 2021-06-07 RX ORDER — LEVOFLOXACIN 750 MG/1
750 TABLET ORAL DAILY
Qty: 10 TABLET | Refills: 0 | Status: SHIPPED | OUTPATIENT
Start: 2021-06-07 | End: 2021-06-17

## 2021-06-07 RX ADMIN — OXCARBAZEPINE 600 MG: 300 TABLET, FILM COATED ORAL at 08:10

## 2021-06-07 RX ADMIN — FOLIC ACID 1 MG: 1 TABLET ORAL at 08:10

## 2021-06-07 RX ADMIN — LEVOTHYROXINE SODIUM 50 MCG: 50 TABLET ORAL at 06:13

## 2021-06-07 RX ADMIN — OLANZAPINE 5 MG: 5 TABLET, FILM COATED ORAL at 08:10

## 2021-06-07 RX ADMIN — DOCUSATE SODIUM 100 MG: 100 CAPSULE, LIQUID FILLED ORAL at 08:10

## 2021-06-07 RX ADMIN — CITALOPRAM HYDROBROMIDE 20 MG: 20 TABLET ORAL at 08:10

## 2021-06-07 RX ADMIN — ENOXAPARIN SODIUM 40 MG: 40 INJECTION SUBCUTANEOUS at 08:11

## 2021-06-07 RX ADMIN — FERROUS GLUCONATE TAB 324 MG (37.5 MG ELEMENTAL IRON) 324 MG: 324 (37.5 FE) TAB at 08:10

## 2021-06-07 RX ADMIN — SODIUM CHLORIDE 30 ML/HR: 9 INJECTION, SOLUTION INTRAVENOUS at 06:13

## 2021-06-07 RX ADMIN — HALOPERIDOL 10 MG: 5 TABLET ORAL at 08:10

## 2021-06-07 RX ADMIN — SODIUM CHLORIDE, PRESERVATIVE FREE 3 ML: 5 INJECTION INTRAVENOUS at 08:09

## 2021-06-07 RX ADMIN — CYANOCOBALAMIN TAB 500 MCG 1000 MCG: 500 TAB at 08:10

## 2021-06-07 NOTE — DISCHARGE SUMMARY
[facility]         HOSPITALIST  DISCHARGE SUMMARY    Patient Name: Gela Michaud  : 1960  MRN: [id number]    Date of Admission: 2021  Date of Discharge:  2021  Primary Care Physician: Toine Duran DO        Active and Resolved Hospital Problems:  Active Hospital Problems    Diagnosis POA   • Bacteremia due to Escherichia coli [R78.81, B96.20] Unknown   • Altered mental state [R41.82] Yes   • UTI (urinary tract infection) [N39.0] Yes   • Hyponatremia [E87.1] Yes   • Hypercholesteremia [E78.00] Yes   • Essential hypertension [I10] Yes   • Acute hepatitis [B17.9] Yes   • Acquired hypothyroidism [E03.9] Yes   • GERD (gastroesophageal reflux disease) [K21.9] Yes   • Seizure disorder (CMS/HCC) [G40.909] Yes   • Migraine headache [G43.909] Yes      Resolved Hospital Problems   No resolved problems to display.       Hospital Course     Hospital Course:  61-year-old female with developmental delay who was accompanied by her caregiver.  Patient's caregiver stated that patient has some difficulty the day prior to admission where she had a runny nose and overall did not feel well.  She tried to stand up the patient yesterday and patient became weak had difficulty walking had slurred speech.  Patient was sticking her tongue out.  Had ataxia and difficulty walking.  Patient cannot provide me with a history.  According to caregiver she walked away to speak to her supervisor and the patient walked into the tub and fell.  She presented to the emergency department initial work-up was consistent with a urinary tract infection patient also had mild hyponatremia and acute renal failure.  She had elevation of liver enzymes with an AST of 200 and an ALT of 152.  There was also a concern that one of the members of the team had been exposed to Covid.  Patient has obtained both immunizations for COVID-19. Patients covid test was negative. Patient was found to have positive blood cultures for E. coli.   Patient also had positive urine culture for E. coli.  Patient was continued on IV antibiotics.  Blood cultures have cleared.  Patient will be transitioned over to oral Levaquin to complete a course.  Patient will be discharged home today in stable condition.  Patient should follow up with her primary care provider in 1 week.         Day of Discharge     Vital Signs:  Temp:  [97.4 °F (36.3 °C)-98.5 °F (36.9 °C)] 97.4 °F (36.3 °C)  Heart Rate:  [62-72] 62  Resp:  [14-18] 16  BP: (132-150)/(72-89) 138/72  Physical Exam:   Constitutional:  Well developed, Well nourished; No Acute distress  HEENT:  Atraumatic, PERRLA, EOMI; No Scleral icterus  Neck:  Supple  Cardiovascular:  RRR; No Murmurs, No Edema  Respiratory:  CTAB; No Diminished Breath Sounds, No Crackles, No Wheezes Bilaterally, No Rhonchi  Chest:  Normal appearance  Abdomen:  Normal BS all 4 Quadrants; No Tenderness  Neurological:  Alert+Ox3 (Alert and oriented to person only), CN II-XII Intact; No Weakness  Psychological:  Mood Normal, Affect Normal  Skin:  No Rash  Genitourinary:  No Aburto Catheter    Discharge Details        Discharge Medications      New Medications      Instructions Start Date   levoFLOXacin 750 MG tablet  Commonly known as: Levaquin   750 mg, Oral, Daily         Continue These Medications      Instructions Start Date   atorvastatin 20 MG tablet  Commonly known as: LIPITOR   20 mg, Oral, Nightly      benztropine 1 MG tablet  Commonly known as: COGENTIN   2 mg, Oral, 2 Times Daily      cholecalciferol 25 MCG (1000 UT) tablet  Commonly known as: VITAMIN D3   1,000 Units, Oral, Daily      citalopram 20 MG tablet  Commonly known as: CeleXA   20 mg, Oral, Daily      docusate sodium 100 MG capsule  Commonly known as: COLACE   100 mg, Oral, 2 Times Daily      ferrous gluconate 324 (37.5 Fe) MG tablet tablet   324 mg, Oral, Daily      folic acid 1 MG tablet  Commonly known as: FOLVITE   1 mg, Oral, Daily      haloperidol 10 MG tablet  Commonly known  as: HALDOL   10 mg, Oral, 2 Times Daily      levothyroxine 50 MCG tablet  Commonly known as: SYNTHROID, LEVOTHROID   50 mcg, Oral, Daily, Take on empty stomach daily at 07:00       lisinopril 10 MG tablet  Commonly known as: PRINIVIL,ZESTRIL   10 mg, Oral, Daily      MiraLax 17 g packet  Generic drug: polyethylene glycol   17 g, Oral, Daily      OLANZapine 5 MG tablet  Commonly known as: zyPREXA   5 mg, Oral, 2 times daily      omeprazole 20 MG capsule  Commonly known as: priLOSEC   20 mg, Oral, Daily, 20 mg PO AC BK       OS-KETAN 500 + D PO   1 tablet, Oral, 2 times daily      OXcarbazepine 300 MG tablet  Commonly known as: TRILEPTAL   300 mg, Oral, 2 Times Daily      sennosides-docusate 8.6-50 MG per tablet  Commonly known as: PERICOLACE   1 tablet, Oral, 2 times daily      vitamin B-12 1000 MCG tablet  Commonly known as: CYANOCOBALAMIN   1,000 mcg, Oral, Daily             Allergies   Allergen Reactions   • Lorazepam Unknown - High Severity       Discharge Disposition:  Home or Self Care    Diet:  Hospital:  Diet Order   Procedures   • Diet Regular       Discharge Activity:   Activity Instructions     Activity as Tolerated            CODE STATUS:  Code Status and Medical Interventions:   Ordered at: 06/05/21 1259     Code Status:    CPR     Medical Interventions (Level of Support Prior to Arrest):    Full         Future Appointments   Date Time Provider Department Center   6/10/2021  4:00 PM Tia Michael PA [facility]   8/5/2021  7:00 PM [facility]       Additional Instructions for the Follow-ups that You Need to Schedule     Discharge Follow-up with PCP   As directed       Currently Documented PCP:    Tonie Duran DO    PCP Phone Number:    [phone]     Follow Up Details: with her pcp in 1 week               Pertinent  and/or Most Recent Results     PROCEDURES:   None    LAB RESULTS:      Lab 06/07/21  0443 06/06/21  0557 06/05/21  0529 06/04/21  0455 06/03/21  0600 06/02/21  2255  06/02/21 2153   WBC 6.06 5.63 3.72 5.07 7.99  --  10.79   HEMOGLOBIN 11.2* 11.0* 10.5* 10.7* 11.3*  --  12.0   HEMATOCRIT 34.0 33.7* 31.5* 32.3* 33.3*  --  35.9*   PLATELETS 184 143 112* 104* 115*  --  133   NEUTROS ABS 3.38 3.46  --  4.09 7.17  --  8.81*   IMMATURE GRANS (ABS) 0.21* 0.08*  --   --   --   --   --    LYMPHS ABS 1.25 0.87  --  0.41* 0.25*  --  0.24*   MONOS ABS 0.82 0.93*  --  0.51 0.39  --  0.55   EOS ABS 0.34 0.25  --  0.01 0.00  --  0.01   MCV 94.2 93.6 94.9 94.7 91.7  --  92.8   LACTATE  --   --   --   --   --  1.5  --    PROTIME  --   --   --   --   --   --  9.8         Lab 06/07/21  0443 06/06/21  0557 06/05/21  0529 06/04/21  0455 06/03/21  0600 06/02/21 2153   SODIUM 130* 135*  --  135 131* 126*   POTASSIUM 4.4 4.1  --  4.2 4.3 4.3   CHLORIDE 97* 100  --  103 97* 93*   TOTAL CO2  --   --   --  22 21* 24   CO2 23.4 26.1  --   --   --   --    ANION GAP 9.6 8.9  --  14 17 13   BUN 16 18  --  15 23 27*   CREATININE 1.01* 1.01*  --  1.09* 1.38* 1.63*   GLUCOSE 118* 116*  --   --   --   --    CALCIUM 8.7 8.6  --  8.2* 8.3* 9.0   MAGNESIUM 1.9 1.7 1.9 1.77  --   --    TSH  --   --   --   --  1.110  --          Lab 06/02/21 2153   TOTAL PROTEIN 6.8   ALBUMIN 3.5   GLOBULIN 3.3   ALT (SGPT) 152*   AST (SGOT) 200*   BILIRUBIN 0.34   ALK PHOS 154         Lab 06/02/21  2153   PROTIME 9.8   INR 0.87*                 Brief Urine Lab Results  (Last result in the past 365 days)      Color   Clarity   Blood   Leuk Est   Nitrite   Protein   CREAT   Urine HCG        06/02/21 2240   TURBID TRACE LARGE  Comment:  URINE MICROSCOPICS:    Only performed if any of the following are present:    Appearance is Sl Cloudy to Turbid; Protein is    30 to >/= 300 mg/dL; Occult Blood, Nitrite, or Leukocyte    Esterase is positive. Color is Red or Orange.   NEGATIVE               Microbiology Results (last 10 days)     Procedure Component Value - Date/Time    Blood Culture - , [[id number]] Collected: 06/04/21 0945    Lab  Status: Preliminary result Updated: 06/05/21 1500     Blood Culture No Growth @1 Day.     Comment: No Growth @1 Day.           Narrative:      Blood Culture Site: PERIPHERAL - Blood Culture SiteSource: BLOOD    Blood Culture - , [[id number]] Collected: 06/04/21 0943    Lab Status: Preliminary result Updated: 06/05/21 1500     Blood Culture No Growth @1 Day.     Comment: No Growth @1 Day.           Narrative:      Blood Culture Site: PERIPHERAL - Blood Culture SiteSource: BLOOD    Rapid Strep A Screen - , [[id number]] Collected: 06/03/21 0010    Lab Status: Final result Updated: 06/05/21 1123     Strep A Ag Negative for Beta Strep Group AMay Be Below Detectable LevelsInternal control Valid    Narrative:      THRSpecial Instructions:NEW ED ED 10Special Instructions:    Influenza Antigen, Rapid - , [[id number]] Collected: 06/03/21 0010    Lab Status: Final result Updated: 06/05/21 1123     CONV INFLUENAZE A&B Negative for Flu A and B protein antigens.To confirm negative results a culture should be performed.Procedural Control Valid.    Narrative:      NPSpecial Instructions:NEW ED ED 10Special Instructions:    CONV CORONAVIRUS COVID-19, REF - , [[id number]] Collected: 06/03/21 0010    Lab Status: Final result Updated: 06/05/21 1129     Coronavirus (COVID-19) NOT DETECTED NA      Comment: The SARS-CoV-2 assay is a real-time, RT-PCR test intended  for the qualitative detection of nucleic acid from the  SARS-CoV-2 in respiratory specimens from individuals,  testing performed at [facility].         Narrative:      Special Instructions:NEW ED ED 10Special Instructions:    Blood Culture - , [[id number]]  (Abnormal) Collected: 06/02/21 2251    Lab Status: Final result Updated: 06/05/21 1158     Blood Culture Escherichia coli     Comment: For BCID results look under SEROLOGY tab in the EMR.           Narrative:      PERIPHERALBloodSpecial Instructions:NEW ED ED 10SpecialInstructions:Blood    ORQUIDEA - , [[id number]]   (Abnormal) Collected: 06/02/21 2251    Lab Status: Final result Updated: 06/05/21 1158     Ampicillin + Sulbactam <=2     Piperacillin + Tazobactam <=4     Cefazolin <=4     Ceftazidime <=1     Cefepime <=0.12     Ertapenem <=0.12     Gentamicin <=1     Tobramycin <=1     Levofloxacin <=0.12     Trimethoprim + Sulfamethoxazole <=20     Cephalothin <=2     Ampicillin <=2     Cefuroxime Na 4     Tigecycline <=0.5     Cefurxime Axetil 4     Cefotetan <=4     Cefoxitin <=4     Cefpodoxime <=0.25     Cefotaxime <=0.25     Ceftazidime + Avibactam <=0.12     Aztreonam <=1     Meropenem <=0.25     Amikacin <=2     Nalidixic Acid 4     Tetracycline <=1     Comment: Escherichia coli  Blood Culture         Blood Culture - , [[id number]]  (Abnormal) Collected: 06/02/21 2248    Lab Status: Final result Updated: 06/05/21 1158     Blood Culture Escherichia coli     Comment: For BCID results look under SEROLOGY tab in the EMR.  Refer to [id number] for sensitivity           Narrative:      PERIPHERALBloodSpecial Instructions:NEW ED ED 10SpecialInstructions:Blood    Urine Culture - , [[id number]]  (Abnormal) Collected: 06/02/21 2240    Lab Status: Final result Updated: 06/05/21 1158     Urine Culture Escherichia coli     Comment: >100,000 cfu/ml           Narrative:      CLEANSpecial Instructions:NEW ED ED 10Special Instructions:    ORQUIDEA - , [[id number]]  (Abnormal) Collected: 06/02/21 2240    Lab Status: Final result Updated: 06/05/21 1158     Ampicillin + Sulbactam <=2     Piperacillin + Tazobactam <=4     Cefazolin <=4     Ceftazidime <=1     Ceftriaxone <=0.25     Ampicillin <=2     Cefepime <=0.12     Trimethoprim + Sulfamethoxazole <=20     Ertapenem <=0.12     Gentamicin <=1     Tobramycin <=1     Levofloxacin <=0.12     Nitrofurantoin <=16     Comment: Escherichia coli  Urine Culture                                Labs Pending at Discharge:  Pending Labs     Order Current Status    Blood Culture - , Preliminary result    Blood  Culture - , Preliminary result            Time spent on Discharge including face to face service:  More than 35 minutes     Electronically signed by Celso Beckwith DO, 06/07/21, 12:13 PM EDT.

## 2021-06-07 NOTE — PLAN OF CARE
Problem: Adult Inpatient Plan of Care  Goal: Plan of Care Review  Outcome: Met  Flowsheets  Taken 6/7/2021 1345 by Meghan Connolly, RN  Plan of Care Reviewed With: patient  Outcome Summary: PLAN OF CARE MET, PT STABLE. TO DC HOME.  Taken 6/7/2021 0440 by Bianca Ayers, ROSAURA  Progress: no change   Goal Outcome Evaluation:  Plan of Care Reviewed With: patient           Outcome Summary: PLAN OF CARE MET, PT STABLE. TO DC HOME.  
Goal Outcome Evaluation:   PATIENT HAS RESTED EXCEPTIONAL THIS SHIFT. NO CHANGES IN PATIENT CONDITION. STACIE RN        
Goal Outcome Evaluation:  Plan of Care Reviewed With: patient  Progress: improving  Outcome Summary: patient unable to comprehend teaching and discussion of plan of care.   Discussed care plan with patient. Patient unable to comprehend complex plan of care, discussed care with caregiver over telephone. Patient reports no new symptoms, no new complaints at this time. No change in patient status.   
Goal Outcome Evaluation:  Plan of Care Reviewed With: patient  Progress: no change      No change in condition. Vitals WNL. New IV initiated for continuation of IV antibiotics. Bianca Ayers RN    
Goal Outcome Evaluation:  Plan of Care Reviewed With: patient  Progress: no change  Outcome Summary: patient unable to comprehend teaching and discussion of plan of care.   No change in patient status, no new complaints or symptoms to report at this time. KRANTHI, RN  
Telemetry

## 2021-06-08 ENCOUNTER — TRANSITIONAL CARE MANAGEMENT TELEPHONE ENCOUNTER (OUTPATIENT)
Dept: CALL CENTER | Facility: HOSPITAL | Age: 61
End: 2021-06-08

## 2021-06-08 NOTE — OUTREACH NOTE
Prep Survey      Responses   [facility] from?  [facility]   Is LACE score < 7 ?  No   Emergency Room discharge w/ pulse ox?  No   Eligibility  TCM   Hospital  [facility]   Date of Admission  06/02/21   Date of Discharge  06/07/21   Discharge Disposition  Home or Self Care   Discharge diagnosis  bacteremia d/t E-coli, UTI, AMS   Does the patient have one of the following disease processes/diagnoses(primary or secondary)?  Other   Does the patient have Home health ordered?  No   Is there a DME ordered?  No   General alerts for this patient  developmental delay - has a caregiver   Prep survey completed?  Yes          Alejandra Garcia RN

## 2021-06-08 NOTE — OUTREACH NOTE
Call Center TCM Note      Responses   [facility] patient discharged from?  [facility]   Does the patient have one of the following disease processes/diagnoses(primary or secondary)?  Other   TCM attempt successful?  Yes   Call start time  1134   Call end time  1138   General alerts for this patient  developmental delay - has a caregiver. She lives with caregiver.    Discharge diagnosis  bacteremia d/t E-coli, UTI, AMS   Person spoke with today (if not patient) and relationship  Yas-caregiver    Meds reviewed with patient/caregiver?  Yes   Is the patient having any side effects they believe may be caused by any medication additions or changes?  No   Does the patient have all medications ordered at discharge?  Yes   Is the patient taking all medications as directed (includes completed medication regime)?  Yes   Comments regarding appointments  Appt with ortho on 6/10/21   Does the patient have a primary care provider?   Yes   Does the patient have an appointment with their PCP within 7 days of discharge?  Yes   Comments regarding PCP  Hospital d/c f/u appt is on 6/14/21 at 2:15 pm    Has the patient kept scheduled appointments due by today?  N/A   Psychosocial issues?  Yes   Psychosocial comments  developmental delay   Did the patient receive a copy of their discharge instructions?  Yes   Nursing interventions  Reviewed instructions with patient   What is the patient's perception of their health status since discharge?  Improving   Is the patient/caregiver able to teach back signs and symptoms related to disease process for when to call PCP?  Yes   Is the patient/caregiver able to teach back signs and symptoms related to disease process for when to call 911?  Yes   Is the patient/caregiver able to teach back the hierarchy of who to call/visit for symptoms/problems? PCP, Specialist, Home health nurse, Urgent Care, ED, 911  Yes   If the patient is a current smoker, are they able to teach back resources for  cessation?  Not a smoker   TCM call completed?  Yes          Juana Aguero, ROSAURA    6/8/2021, 11:39 EDT

## 2021-06-09 LAB
BACTERIA BLD CULT: NORMAL
BACTERIA BLD CULT: NORMAL

## 2021-06-14 ENCOUNTER — OFFICE VISIT (OUTPATIENT)
Dept: FAMILY MEDICINE CLINIC | Facility: CLINIC | Age: 61
End: 2021-06-14

## 2021-06-14 VITALS
BODY MASS INDEX: 31.01 KG/M2 | DIASTOLIC BLOOD PRESSURE: 83 MMHG | OXYGEN SATURATION: 96 % | HEIGHT: 63 IN | RESPIRATION RATE: 18 BRPM | SYSTOLIC BLOOD PRESSURE: 132 MMHG | WEIGHT: 175 LBS | HEART RATE: 77 BPM | TEMPERATURE: 98.2 F

## 2021-06-14 DIAGNOSIS — E03.9 ACQUIRED HYPOTHYROIDISM: ICD-10-CM

## 2021-06-14 DIAGNOSIS — Z09 HOSPITAL DISCHARGE FOLLOW-UP: Primary | ICD-10-CM

## 2021-06-14 DIAGNOSIS — Z13.220 SCREENING FOR LIPID DISORDERS: ICD-10-CM

## 2021-06-14 PROBLEM — E87.1 HYPONATREMIA: Status: RESOLVED | Noted: 2021-06-04 | Resolved: 2021-06-14

## 2021-06-14 PROBLEM — R78.81 BACTEREMIA DUE TO ESCHERICHIA COLI: Status: RESOLVED | Noted: 2021-06-07 | Resolved: 2021-06-14

## 2021-06-14 PROBLEM — N39.0 UTI (URINARY TRACT INFECTION): Status: RESOLVED | Noted: 2021-06-04 | Resolved: 2021-06-14

## 2021-06-14 PROBLEM — B96.20 BACTEREMIA DUE TO ESCHERICHIA COLI: Status: RESOLVED | Noted: 2021-06-07 | Resolved: 2021-06-14

## 2021-06-14 PROCEDURE — 99213 OFFICE O/P EST LOW 20 MIN: CPT | Performed by: FAMILY MEDICINE

## 2021-06-14 RX ORDER — OXCARBAZEPINE 300 MG/1
TABLET, FILM COATED ORAL
COMMUNITY
Start: 2021-05-06 | End: 2021-09-27 | Stop reason: SDUPTHER

## 2021-06-14 RX ORDER — SENNA PLUS 8.6 MG/1
TABLET ORAL
COMMUNITY
Start: 2021-05-06 | End: 2021-07-14 | Stop reason: SDUPTHER

## 2021-06-14 RX ORDER — BENZTROPINE MESYLATE 2 MG/1
2 TABLET ORAL 2 TIMES DAILY
COMMUNITY
End: 2021-09-27

## 2021-06-14 RX ORDER — LISINOPRIL 5 MG/1
TABLET ORAL
COMMUNITY
Start: 2021-03-23 | End: 2021-09-01 | Stop reason: SDUPTHER

## 2021-06-14 RX ORDER — LANOLIN ALCOHOL/MO/W.PET/CERES
1000 CREAM (GRAM) TOPICAL DAILY
Qty: 90 TABLET | Refills: 1 | Status: SHIPPED | OUTPATIENT
Start: 2021-06-14 | End: 2021-09-01 | Stop reason: SDUPTHER

## 2021-06-14 RX ORDER — FERROUS SULFATE 324(65)MG
TABLET, DELAYED RELEASE (ENTERIC COATED) ORAL
COMMUNITY
End: 2022-09-09 | Stop reason: SDUPTHER

## 2021-06-14 RX ORDER — LISINOPRIL 10 MG/1
10 TABLET ORAL DAILY
Qty: 90 TABLET | Refills: 1 | Status: SHIPPED | OUTPATIENT
Start: 2021-06-14 | End: 2021-09-27

## 2021-06-14 RX ORDER — POLYETHYLENE GLYCOL 3350 17 G/17G
17 POWDER, FOR SOLUTION ORAL
COMMUNITY
Start: 2021-05-19 | End: 2022-12-11 | Stop reason: SDUPTHER

## 2021-06-14 RX ORDER — MELATONIN
1000 DAILY
Qty: 90 TABLET | Refills: 1 | Status: SHIPPED | OUTPATIENT
Start: 2021-06-14 | End: 2021-07-02 | Stop reason: SDUPTHER

## 2021-06-14 NOTE — PROGRESS NOTES
"Chief Complaint  Hospital Follow Up Visit (06/02/21 - 06/08/21 estimated dates )    Subjective          Gela Michaud presents to [facility]  She is here today for a hospital follow-up for recent hospitalization for E. coli urinary tract infection. She is doing well at today's visit. Since DC she has been drinking and eating normally. She has not had a fever or chills. She is still taking Levaquin as prescribed.      The patient has no other complaints today and denies chest pain, shortness of breath, weakness, numbness, nausea, vomiting, diarrhea, dizziness or syncopal event.        Objective   Vital Signs:   /83 (BP Location: Left arm, Patient Position: Sitting)   Pulse 77   Temp 98.2 °F (36.8 °C)   Resp 18   Ht 160 cm (63\")   Wt 79.4 kg (175 lb)   SpO2 96%   BMI 31.00 kg/m²     Physical Exam  Vitals reviewed.   Constitutional:       Appearance: Normal appearance. She is well-developed.   HENT:      Head: Normocephalic and atraumatic.      Right Ear: External ear normal.      Left Ear: External ear normal.      Mouth/Throat:      Pharynx: No oropharyngeal exudate.   Eyes:      Conjunctiva/sclera: Conjunctivae normal.      Pupils: Pupils are equal, round, and reactive to light.   Neck:      Vascular: No carotid bruit.   Cardiovascular:      Rate and Rhythm: Normal rate and regular rhythm.      Heart sounds: No murmur heard.   No friction rub. No gallop.    Pulmonary:      Effort: Pulmonary effort is normal.      Breath sounds: Normal breath sounds. No wheezing or rhonchi.   Abdominal:      General: Bowel sounds are normal. There is no distension.      Palpations: Abdomen is soft.      Tenderness: There is no abdominal tenderness.   Skin:     General: Skin is warm and dry.   Neurological:      Mental Status: She is alert and oriented to person, place, and time.      Cranial Nerves: No cranial nerve deficit.      Motor: No weakness.   Psychiatric:         Mood and " Affect: Mood and affect normal.         Behavior: Behavior normal.         Thought Content: Thought content normal.         Judgment: Judgment normal.        Result Review :                 Assessment and Plan    Diagnoses and all orders for this visit:    1. Hospital discharge follow-up (Primary)  Comments:  The patient is doing well at today's visit. She is still taking her antibiotic as directed.     Other orders  -     lisinopril (PRINIVIL,ZESTRIL) 10 MG tablet; Take 1 tablet by mouth Daily.  Dispense: 90 tablet; Refill: 1  -     vitamin B-12 (CYANOCOBALAMIN) 1000 MCG tablet; Take 1 tablet by mouth Daily.  Dispense: 90 tablet; Refill: 1  -     cholecalciferol (VITAMIN D3) 25 MCG (1000 UT) tablet; Take 1 tablet by mouth Daily.  Dispense: 90 tablet; Refill: 1        Follow Up   Return in about 3 months (around 9/14/2021).  Patient was given instructions and counseling regarding her condition or for health maintenance advice. Please see specific information pulled into the AVS if appropriate.

## 2021-06-15 ENCOUNTER — READMISSION MANAGEMENT (OUTPATIENT)
Dept: CALL CENTER | Facility: HOSPITAL | Age: 61
End: 2021-06-15

## 2021-06-15 NOTE — OUTREACH NOTE
Medical Week 2 Survey      Responses   [facility] patient discharged from?  [facility]   Does the patient have one of the following disease processes/diagnoses(primary or secondary)?  Other   Week 2 attempt successful?  Yes   Call start time  1013   General alerts for this patient  Developmental delay - has a caregiver. She lives with caregiver.    Discharge diagnosis  Bacteremia d/t E-coli, UTI, AMS   Call end time  1016   Person spoke with today (if not patient) and relationship  Yas-caregiver    Meds reviewed with patient/caregiver?  Yes   Is the patient having any side effects they believe may be caused by any medication additions or changes?  No   Does the patient have all medications ordered at discharge?  Yes   Is the patient taking all medications as directed (includes completed medication regime)?  Yes [Still taking antibiotics with about 4 remaining,  understands to take until completed]   Comments regarding appointments  Ortho appt was rescheduled for 6/18/21   Does the patient have a primary care provider?   Yes   Does the patient have an appointment with their PCP within 7 days of discharge?  Yes   Comments regarding PCP  Hospital d/c f/u appt is on 6/14/21 at 2:15 pm---Verified compliant w/fu appt yesterday 6/14/21   Has the patient kept scheduled appointments due by today?  Yes   Psychosocial comments  developmental delay   Did the patient receive a copy of their discharge instructions?  Yes   Nursing interventions  -- [REviewed with CG]   What is the patient's perception of their health status since discharge?  Improving   Is the patient/caregiver able to teach back signs and symptoms related to disease process for when to call PCP?  Yes   Is the patient/caregiver able to teach back signs and symptoms related to disease process for when to call 911?  Yes   Is the patient/caregiver able to teach back the hierarchy of who to call/visit for symptoms/problems? PCP, Specialist, Home health nurse,  Urgent Care, ED, 911  Yes   If the patient is a current smoker, are they able to teach back resources for cessation?  Not a smoker   Week 2 Call Completed?  Yes          Lucrecia Cleary RN

## 2021-06-18 ENCOUNTER — OFFICE VISIT (OUTPATIENT)
Dept: ORTHOPEDIC SURGERY | Facility: CLINIC | Age: 61
End: 2021-06-18

## 2021-06-18 VITALS — BODY MASS INDEX: 31.01 KG/M2 | HEIGHT: 63 IN | HEART RATE: 67 BPM | WEIGHT: 175 LBS | OXYGEN SATURATION: 99 %

## 2021-06-18 DIAGNOSIS — S62.301D CLOSED NONDISPLACED FRACTURE OF SECOND METACARPAL BONE OF LEFT HAND WITH ROUTINE HEALING, UNSPECIFIED PORTION OF METACARPAL, SUBSEQUENT ENCOUNTER: ICD-10-CM

## 2021-06-18 DIAGNOSIS — S62.303D: ICD-10-CM

## 2021-06-18 DIAGNOSIS — M79.642 PAIN OF LEFT HAND: Primary | ICD-10-CM

## 2021-06-18 PROCEDURE — 99212 OFFICE O/P EST SF 10 MIN: CPT | Performed by: PHYSICIAN ASSISTANT

## 2021-06-18 NOTE — PATIENT INSTRUCTIONS
Patient able to d/c mario loop and brace. She will follow up, should she regress or  Have any issues.

## 2021-06-18 NOTE — PROGRESS NOTES
"Chief Complaint  Pain of the Left Hand    Subjective          Gela Michaud presents to [facility] for follow-up of second and third metacarpal fractures.  Patient has been in the Ron brace for the past 3 weeks.  She was in a short arm cast for 4 weeks prior to this.  Patient reports that her pain is improved since she was seen last.  She presents today with an adult caretaker.  Her caretaker states that she has not had any complaints of her hand since her last visit.    Objective   Vital Signs:   Pulse 67   Ht 160 cm (63\")   Wt 79.4 kg (175 lb)   SpO2 99%   BMI 31.00 kg/m²       Physical Exam  Constitutional:       Appearance: Normal appearance. He is well-developed and normal weight.   HENT:      Head: Normocephalic.      Right Ear: Hearing and external ear normal.      Left Ear: Hearing and external ear normal.      Nose: Nose normal.   Eyes:      Conjunctiva/sclera: Conjunctivae normal.   Cardiovascular:      Rate and Rhythm: Normal rate.   Pulmonary:      Effort: Pulmonary effort is normal.      Breath sounds: No wheezing or rales.   Abdominal:      Palpations: Abdomen is soft.      Tenderness: There is no abdominal tenderness.   Musculoskeletal:      Cervical back: Normal range of motion.   Skin:     Findings: No rash.   Neurological:      Mental Status: He is alert and oriented to person, place, and time.   Psychiatric:         Mood and Affect: Mood and affect normal.         Judgment: Judgment normal.     Ortho Exam  Left hand: Nontender at the fracture site.  Patient able to make a fist.  Good  strength.  Patient able to wiggle all fingers.  Patient able to make pincer motion.  Full active range of motion of the wrist.  Sensation is intact.  Neurovascular intact.  Radial pulse 2+.  Result Review :   The following data was reviewed by: VANGIE Weber on 06/18/2021:    Data reviewed: Radiologic studies 06/18/21     Imaging Results (Most Recent)     Procedure " Component Value Units Date/Time    XR Hand 2 View Left [[id number]] Resulted: 06/18/21 1157     Updated: 06/18/21 1158    Narrative:      X-Ray Report:  Study: X-rays ordered, taken in the office, and reviewed today  Site: Left hand xray  Indication: Left hand pain  View: AP and Lateral view(s)  Findings: Well-healing second and third metacarpal fractures.  No further   displacement  Prior studies available for comparison: yes                   Assessment and Plan    Problem List Items Addressed This Visit        Musculoskeletal and Injuries    Pain of left hand - Primary    Relevant Orders    XR Hand 2 View Left (Completed)    Closed displaced fracture of third metacarpal bone of left hand with routine healing    Closed nondisplaced fracture of second metacarpal bone of left hand with routine healing          Follow Up   Return if symptoms worsen or fail to improve.  Patient Instructions   Patient able to d/c mario loop and brace. She will follow up, should she regress or  Have any issues.       Patient was given instructions and counseling regarding her condition or for health maintenance advice. Please see specific information pulled into the AVS if appropriate.

## 2021-06-21 NOTE — TELEPHONE ENCOUNTER
Caller: NEETA HAND    Relationship: Emergency Contact    Best call back number: [phone]     Medication needed:   Requested Prescriptions     Pending Prescriptions Disp Refills   • levothyroxine (SYNTHROID, LEVOTHROID) 50 MCG tablet       Sig: Take 1 tablet by mouth Daily. Take on empty stomach daily at 07:00       When do you need the refill by: ASAP    What additional details did the patient provide when requesting the medication: PATIENT STATED THAT THIS WAS DISCUSSED AT VISIT ON 6/14/21 AND PHARMACY HAS NOT RECEIVED PRESCRIPTION. PATIENT IS NOW OUT    Does the patient have less than a 3 day supply:  [x] Yes  [] No    What is the patient's preferred pharmacy: [facility] [place], [place] - [address]  [phone] [facility] [phone] FX

## 2021-06-22 ENCOUNTER — READMISSION MANAGEMENT (OUTPATIENT)
Dept: CALL CENTER | Facility: HOSPITAL | Age: 61
End: 2021-06-22

## 2021-06-22 RX ORDER — LEVOTHYROXINE SODIUM 0.05 MG/1
50 TABLET ORAL DAILY
Qty: 90 TABLET | Refills: 1 | Status: SHIPPED | OUTPATIENT
Start: 2021-06-22 | End: 2021-12-03

## 2021-06-22 NOTE — OUTREACH NOTE
"Medical Week 3 Survey      Responses   [facility] patient discharged from?  [facility]   Does the patient have one of the following disease processes/diagnoses(primary or secondary)?  Other   Week 3 attempt successful?  Yes   Call start time  1440   Call end time  1446   General alerts for this patient  Developmental delay - has a caregiver. She lives with caregiver.    Discharge diagnosis  Bacteremia d/t E-coli, UTI, AMS   Person spoke with today (if not patient) and relationship  Yas-caregiver    Meds reviewed with patient/caregiver?  Yes   Is the patient having any side effects they believe may be caused by any medication additions or changes?  No   Does the patient have all medications ordered at discharge?  Yes   Is the patient taking all medications as directed (includes completed medication regime)?  Yes [Antibiotics completed]   Comments regarding appointments  Ortho and PCP visit completed   Does the patient have a primary care provider?   Yes   Has the patient kept scheduled appointments due by today?  Yes   Did the patient receive a copy of their discharge instructions?  Yes   Nursing interventions  -- [Enc'd to review and call nurse call line for any questions/concerns]   What is the patient's perception of their health status since discharge?  Returned to baseline/stable   Is the patient/caregiver able to teach back signs and symptoms related to disease process for when to call PCP?  Yes   Is the patient/caregiver able to teach back signs and symptoms related to disease process for when to call 911?  Yes   Is the patient/caregiver able to teach back the hierarchy of who to call/visit for symptoms/problems? PCP, Specialist, Home health nurse, Urgent Care, ED, 911  Yes   If the patient is a current smoker, are they able to teach back resources for cessation?  Not a smoker   Additional teach back comments  CG reports patient offers no c/o \"kidney pain\" and no visible s/s UTI, brace to hand removed "   Week 3 Call Completed?  Yes   Graduated  Yes   Graduated/Revoked comments  No immediate needs, f/u's completed, patient returned to baseline, CG understands to call MD for any questions/concerns          Lucrecia Cleary RN

## 2021-06-30 ENCOUNTER — TELEPHONE (OUTPATIENT)
Dept: FAMILY MEDICINE CLINIC | Facility: CLINIC | Age: 61
End: 2021-06-30

## 2021-07-01 RX ORDER — SULFAMETHOXAZOLE AND TRIMETHOPRIM 800; 160 MG/1; MG/1
1 TABLET ORAL 2 TIMES DAILY
Qty: 14 TABLET | Refills: 0 | Status: SHIPPED | OUTPATIENT
Start: 2021-07-01 | End: 2021-09-27

## 2021-07-01 NOTE — TELEPHONE ENCOUNTER
Spoke with Caregiver and states pt seemed like it burnt when she urinated and was not fully emptying bladder unless caregiver told her to sit there a little longer.

## 2021-07-02 RX ORDER — MELATONIN
1000 DAILY
Qty: 90 TABLET | Refills: 1 | Status: SHIPPED | OUTPATIENT
Start: 2021-07-02 | End: 2021-12-02

## 2021-07-14 RX ORDER — FOLIC ACID 1 MG/1
1 TABLET ORAL DAILY
Qty: 90 TABLET | Refills: 1 | Status: SHIPPED | OUTPATIENT
Start: 2021-07-14 | End: 2022-01-10

## 2021-07-14 RX ORDER — SENNA PLUS 8.6 MG/1
1 TABLET ORAL 2 TIMES DAILY
Qty: 180 TABLET | Refills: 1 | Status: SHIPPED | OUTPATIENT
Start: 2021-07-14 | End: 2022-01-13 | Stop reason: SDUPTHER

## 2021-07-14 RX ORDER — OXCARBAZEPINE 300 MG/1
300 TABLET, FILM COATED ORAL 2 TIMES DAILY
Qty: 180 TABLET | Refills: 1 | Status: SHIPPED | OUTPATIENT
Start: 2021-07-14 | End: 2022-01-10

## 2021-07-15 VITALS — HEIGHT: 63 IN | OXYGEN SATURATION: 97 % | HEART RATE: 77 BPM | BODY MASS INDEX: 30.48 KG/M2 | WEIGHT: 172 LBS

## 2021-07-15 VITALS — OXYGEN SATURATION: 99 % | BODY MASS INDEX: 30.89 KG/M2 | HEART RATE: 62 BPM | HEIGHT: 63 IN | WEIGHT: 174.37 LBS

## 2021-08-05 ENCOUNTER — APPOINTMENT (OUTPATIENT)
Dept: MAMMOGRAPHY | Facility: HOSPITAL | Age: 61
End: 2021-08-05

## 2021-09-01 RX ORDER — ATORVASTATIN CALCIUM 20 MG/1
20 TABLET, FILM COATED ORAL NIGHTLY
Qty: 90 TABLET | Refills: 3 | Status: SHIPPED | OUTPATIENT
Start: 2021-09-01 | End: 2022-08-31

## 2021-09-01 RX ORDER — FERROUS GLUCONATE 324(37.5)
324 TABLET ORAL DAILY
Qty: 90 TABLET | Refills: 3 | Status: SHIPPED | OUTPATIENT
Start: 2021-09-01 | End: 2021-09-27

## 2021-09-01 RX ORDER — LANOLIN ALCOHOL/MO/W.PET/CERES
1000 CREAM (GRAM) TOPICAL DAILY
Qty: 90 TABLET | Refills: 1 | Status: SHIPPED | OUTPATIENT
Start: 2021-09-01 | End: 2022-03-01

## 2021-09-01 RX ORDER — POLYETHYLENE GLYCOL 3350 17 G/17G
17 POWDER, FOR SOLUTION ORAL DAILY
Qty: 90 PACKET | Refills: 3 | Status: SHIPPED | OUTPATIENT
Start: 2021-09-01 | End: 2021-12-08

## 2021-09-01 RX ORDER — LISINOPRIL 5 MG/1
5 TABLET ORAL DAILY
Qty: 90 TABLET | Refills: 3 | Status: SHIPPED | OUTPATIENT
Start: 2021-09-01 | End: 2021-09-27

## 2021-09-01 NOTE — TELEPHONE ENCOUNTER
Caller: NEETA HAND    Relationship: Emergency Contact    Best call back number: [phone]     Medication needed:   Requested Prescriptions     Pending Prescriptions Disp Refills   • ferrous gluconate 324 (37.5 Fe) MG tablet tablet       Sig: Take 1 tablet by mouth Daily.   • atorvastatin (LIPITOR) 20 MG tablet       Sig: Take 1 tablet by mouth Every Night.   • lisinopril (PRINIVIL,ZESTRIL) 5 MG tablet     • polyethylene glycol (MiraLax) 17 g packet       Sig: Take 17 g by mouth Daily.   • Calcium Carb-Cholecalciferol (Os-Es Calcium + D3) 500-200 MG-UNIT tablet 60 tablet      Sig: Take  by mouth 2 (two) times a day.   • vitamin B-12 (CYANOCOBALAMIN) 1000 MCG tablet 90 tablet 1     Sig: Take 1 tablet by mouth Daily.       When do you need the refill by: 21    What additional details did the patient provide when requesting the medication: MED EXPIRES ON 9/3/21. CAREGIVER CANNOT GIVE TO PATIENT AFTER 21 IF THE PRESCRIPTION HAS . PLEASE REFILL ASAP    Does the patient have less than a 3 day supply:  [x] Yes  [] No    What is the patient's preferred pharmacy: [facility] [place], [place] - [facility] [address].  [phone] [facility] [phone] FX

## 2021-09-01 NOTE — TELEPHONE ENCOUNTER
Refill request.  I was trying to fill all of these for patient but the calcium carb mg , wouldn't go in correctly . Can you please send it in .

## 2021-09-27 ENCOUNTER — OFFICE VISIT (OUTPATIENT)
Dept: FAMILY MEDICINE CLINIC | Facility: CLINIC | Age: 61
End: 2021-09-27

## 2021-09-27 VITALS
BODY MASS INDEX: 30.56 KG/M2 | OXYGEN SATURATION: 96 % | TEMPERATURE: 97.3 F | HEIGHT: 63 IN | DIASTOLIC BLOOD PRESSURE: 77 MMHG | HEART RATE: 74 BPM | RESPIRATION RATE: 18 BRPM | WEIGHT: 172.5 LBS | SYSTOLIC BLOOD PRESSURE: 140 MMHG

## 2021-09-27 DIAGNOSIS — Z00.00 ENCOUNTER FOR ANNUAL WELLNESS EXAM IN MEDICARE PATIENT: Primary | ICD-10-CM

## 2021-09-27 DIAGNOSIS — E66.09 CLASS 1 OBESITY DUE TO EXCESS CALORIES WITHOUT SERIOUS COMORBIDITY WITH BODY MASS INDEX (BMI) OF 30.0 TO 30.9 IN ADULT: Chronic | ICD-10-CM

## 2021-09-27 DIAGNOSIS — I10 ESSENTIAL HYPERTENSION: ICD-10-CM

## 2021-09-27 DIAGNOSIS — E78.00 HYPERCHOLESTEREMIA: ICD-10-CM

## 2021-09-27 DIAGNOSIS — E03.9 ACQUIRED HYPOTHYROIDISM: ICD-10-CM

## 2021-09-27 PROBLEM — E66.811 CLASS 1 OBESITY DUE TO EXCESS CALORIES WITHOUT SERIOUS COMORBIDITY WITH BODY MASS INDEX (BMI) OF 30.0 TO 30.9 IN ADULT: Chronic | Status: ACTIVE | Noted: 2021-09-27

## 2021-09-27 PROCEDURE — G0439 PPPS, SUBSEQ VISIT: HCPCS | Performed by: FAMILY MEDICINE

## 2021-09-27 RX ORDER — DORZOLAMIDE HCL 20 MG/ML
1 SOLUTION/ DROPS OPHTHALMIC 2 TIMES DAILY
COMMUNITY

## 2021-09-27 RX ORDER — LISINOPRIL 5 MG/1
5 TABLET ORAL 2 TIMES DAILY
COMMUNITY
End: 2022-08-31

## 2021-09-27 RX ORDER — HALOPERIDOL 10 MG/1
10 TABLET ORAL
COMMUNITY
End: 2021-09-27

## 2021-09-27 NOTE — ASSESSMENT & PLAN NOTE
Patient's (Body mass index is 30.56 kg/m².) indicates that they are obese (BMI >30) with health conditions that include hypertension and dyslipidemias . Weight is unchanged. BMI is is above average; BMI management plan is completed. We discussed low calorie, low carb based diet program, portion control and increasing exercise.

## 2021-09-27 NOTE — PROGRESS NOTES
The ABCs of the Annual Wellness Visit  Subsequent Medicare Wellness Visit    Chief Complaint   Patient presents with   • Annual Exam   • Medicare Wellness-subsequent      Subjective    History of Present Illness:  Gela Michaud is a 61 y.o. female who presents for a Subsequent Medicare Wellness Visit.    The following portions of the patient's history were reviewed and   updated as appropriate: allergies, current medications, past family history, past medical history, past social history, past surgical history and problem list.    Compared to one year ago, the patient feels her physical   health is the same.    Compared to one year ago, the patient feels her mental   health is the same.    Recent Hospitalizations:  This patient has had a [facility] [place], operated by [facility] admission record on file within the last 365 days.    Current Medical Providers:  Patient Care Team:  Tonie Duran DO as PCP - General (Family Medicine)    Outpatient Medications Prior to Visit   Medication Sig Dispense Refill   • atorvastatin (LIPITOR) 20 MG tablet Take 1 tablet by mouth Every Night. 90 tablet 3   • benztropine (COGENTIN) 1 MG tablet Take 2 mg by mouth 2 (Two) Times a Day.     • calcium-vitamin D (Oscal 500/200 D-3) 500-200 MG-UNIT per tablet Os-Se 500 + D3 500 mg(1,250mg) -200 unit oral tablet take 1 tablet by oral route daily   Suspended     • cholecalciferol (VITAMIN D3) 25 MCG (1000 UT) tablet Take 1 tablet by mouth Daily. 90 tablet 1   • citalopram (CeleXA) 20 MG tablet Take 20 mg by mouth Daily.     • Cobalamin Combinations (B-12 + Folic Acid) 2500-400 MCG tablet dispersible      • docusate sodium (COLACE) 100 MG capsule Take 100 mg by mouth 2 (Two) Times a Day.     • dorzolamide (TRUSOPT) 2 % ophthalmic solution 1 drop 2 (Two) Times a Day.     • ferrous sulfate 324 MG tablet delayed-release ferrous sulfate 324 mg (65 mg iron) oral tablet,delayed release (DR/EC) take 1 tablet by oral route once   Suspended     • folic acid (FOLVITE) 1 MG  tablet Take 1 tablet by mouth Daily. 90 tablet 1   • haloperidol (HALDOL) 10 MG tablet Take 10 mg by mouth 2 (Two) Times a Day.     • levothyroxine (SYNTHROID, LEVOTHROID) 50 MCG tablet Take 1 tablet by mouth Daily. Take on empty stomach daily at 07:00 90 tablet 1   • lisinopril (Zestril) 5 MG tablet Take 5 mg by mouth 2 (two) times a day. Take two 5mg tablets daily     • OLANZapine (zyPREXA) 5 MG tablet Take 5 mg by mouth 2 (two) times a day.     • omeprazole (priLOSEC) 20 MG capsule Take 20 mg by mouth Daily. 20 mg PO AC BK     • OXcarbazepine (TRILEPTAL) 300 MG tablet Take 1 tablet by mouth 2 (Two) Times a Day. 180 tablet 1   • polyethylene glycol (MiraLax) 17 g packet Take 17 g by mouth Daily. 90 packet 3   • polyethylene glycol (MiraLax) 17 GM/SCOOP powder 17 g.     • senna (Senokot) 8.6 MG tablet Take 1 tablet by mouth 2 (Two) Times a Day. 180 tablet 1   • Vitamin A 3 MG (82454 UT) capsule Take 10,000 Units by mouth Daily.     • vitamin B-12 (CYANOCOBALAMIN) 1000 MCG tablet Take 1 tablet by mouth Daily. 90 tablet 1   • haloperidol (HALDOL) 10 MG tablet 10 mg.     • benztropine (COGENTIN) 2 MG tablet Take 2 mg by mouth 2 (Two) Times a Day.     • Calcium Carb-Cholecalciferol (Os-Se Calcium + D3) 500-200 MG-UNIT tablet Take 1 tablet by mouth 2 (two) times a day. 60 tablet 5   • ferrous gluconate 324 (37.5 Fe) MG tablet tablet Take 1 tablet by mouth Daily. 90 tablet 3   • lisinopril (PRINIVIL,ZESTRIL) 10 MG tablet Take 1 tablet by mouth Daily. 90 tablet 1   • lisinopril (PRINIVIL,ZESTRIL) 5 MG tablet Take 1 tablet by mouth Daily for 90 days. 90 tablet 3   • OXcarbazepine (Trileptal) 300 MG tablet Trileptal 300 mg oral tablet take 2 tablets by oral route 2 times a day   Suspended     • sennosides-docusate (senna-docusate sodium) 8.6-50 MG per tablet Take 1 tablet by mouth 2 (two) times a day.     • sulfamethoxazole-trimethoprim (Bactrim DS) 800-160 MG per tablet Take 1 tablet by mouth 2 (Two) Times a Day. 14  "tablet 0     No facility-administered medications prior to visit.       No opioid medication identified on active medication list. I have reviewed chart for other potential  high risk medication/s and harmful drug interactions in the elderly.          Aspirin is not on active medication list.  Aspirin use is not indicated based on review of current medical condition/s. Risk of harm outweighs potential benefits.  .    Patient Active Problem List   Diagnosis   • Altered mental state   • Hypercholesteremia   • Essential hypertension   • Acute hepatitis   • Acquired hypothyroidism   • GERD (gastroesophageal reflux disease)   • Seizure disorder (CMS/HCC)   • Migraine headache   • Pain of left hand   • Closed displaced fracture of third metacarpal bone of left hand with routine healing   • Closed nondisplaced fracture of second metacarpal bone of left hand with routine healing   • Encounter for annual wellness exam in Medicare patient   • Class 1 obesity due to excess calories without serious comorbidity with body mass index (BMI) of 30.0 to 30.9 in adult     Advance Care Planning  Advance Directive is not on file.  ACP discussion was declined by the patient. the patient is a [facility]ia of the [facility]          Objective    Vitals:    09/27/21 1603   BP: 140/77   BP Location: Left arm   Patient Position: Sitting   Pulse: 74   Resp: 18   Temp: 97.3 °F (36.3 °C)   SpO2: 96%   Weight: 78.2 kg (172 lb 8 oz)   Height: 160 cm (62.99\")   PainSc: 0-No pain     BMI Readings from Last 1 Encounters:   09/27/21 30.56 kg/m²   BMI is above normal parameters. Recommendations include: The caregiver was encouraged to reduced high calorie foods.     Does the patient have evidence of cognitive impairment? Yes    Physical Exam            HEALTH RISK ASSESSMENT    Smoking Status:  Social History     Tobacco Use   Smoking Status Never Smoker   Smokeless Tobacco Never Used   Tobacco Comment    second hand smoke exposure-never     Alcohol " Consumption:  Social History     Substance and Sexual Activity   Alcohol Use Never     Fall Risk Screen:    STEADI Fall Risk Assessment has not been completed.    Depression Screening:  PHQ-2/PHQ-9 Depression Screening 6/14/2021   Little interest or pleasure in doing things 0   Feeling down, depressed, or hopeless 0   Total Score 0       Health Habits and Functional and Cognitive Screening:  No flowsheet data found.    Age-appropriate Screening Schedule:  Refer to the list below for future screening recommendations based on patient's age, sex and/or medical conditions. Orders for these recommended tests are listed in the plan section. The patient has been provided with a written plan.    Health Maintenance   Topic Date Due   • MAMMOGRAM  06/22/2018   • LIPID PANEL  09/22/2021   • TDAP/TD VACCINES (1 - Tdap) 10/27/2021 (Originally 1/16/1979)   • ZOSTER VACCINE (1 of 2) 11/27/2021 (Originally 1/16/2010)   • INFLUENZA VACCINE  10/01/2021              Assessment/Plan   CMS Preventative Services Quick Reference  Risk Factors Identified During Encounter  Obesity/Overweight   The above risks/problems have been discussed with the patient.  Follow up actions/plans if indicated are seen below in the Assessment/Plan Section.  Pertinent information has been shared with the patient in the After Visit Summary.    Diagnoses and all orders for this visit:    1. Encounter for annual wellness exam in Medicare patient (Primary)  Assessment & Plan:  The patient appears to be doing well at today's visit.  She is currently well dressed and has good hygiene.  She is mentally challenged but is able to talk in says she feels good.  No cause of concern was raised during today's encounter.      2. Essential hypertension  -     Comprehensive Metabolic Panel; Future  -     CBC & Differential; Future    3. Hypercholesteremia  -     Lipid Panel; Future    4. Acquired hypothyroidism  -     TSH; Future  -     T4, free; Future    5. Class 1 obesity  due to excess calories without serious comorbidity with body mass index (BMI) of 30.0 to 30.9 in adult  Assessment & Plan:  Patient's (Body mass index is 30.56 kg/m².) indicates that they are obese (BMI >30) with health conditions that include hypertension and dyslipidemias . Weight is unchanged. BMI is is above average; BMI management plan is completed. We discussed low calorie, low carb based diet program, portion control and increasing exercise.         Follow Up:   Return in about 6 months (around 3/27/2022).     An After Visit Summary and PPPS were made available to the patient.

## 2021-09-27 NOTE — ASSESSMENT & PLAN NOTE
The patient appears to be doing well at today's visit.  She is currently well dressed and has good hygiene.  She is mentally challenged but is able to talk in says she feels good.  No cause of concern was raised during today's encounter.

## 2021-09-27 NOTE — PATIENT INSTRUCTIONS
Obesity, Adult  Obesity is the condition of having too much total body fat. Being overweight or obese means that your weight is greater than what is considered healthy for your body size. Obesity is determined by a measurement called BMI. BMI is an estimate of body fat and is calculated from height and weight. For adults, a BMI of 30 or higher is considered obese.  Obesity can lead to other health concerns and major illnesses, including:  · Stroke.  · Coronary artery disease (CAD).  · Type 2 diabetes.  · Some types of cancer, including cancers of the colon, breast, uterus, and gallbladder.  · Osteoarthritis.  · High blood pressure (hypertension).  · High cholesterol.  · Sleep apnea.  · Gallbladder stones.  · Infertility problems.  What are the causes?  Common causes of this condition include:  · Eating daily meals that are high in calories, sugar, and fat.  · Being born with genes that may make you more likely to become obese.  · Having a medical condition that causes obesity, including:  ? Hypothyroidism.  ? Polycystic ovarian syndrome (PCOS).  ? Binge-eating disorder.  ? Cushing syndrome.  · Taking certain medicines, such as steroids, antidepressants, and seizure medicines.  · Not being physically active (sedentary lifestyle).  · Not getting enough sleep.  · Drinking high amounts of sugar-sweetened beverages, such as soft drinks.  What increases the risk?  The following factors may make you more likely to develop this condition:  · Having a family history of obesity.  · Being a woman of  descent.  · Being a man of  descent.  · Living in an area with limited access to:  ? [place], recreation centers, or sidewalks.  ? Healthy food choices, such as grocery stores and farmers' markets.  What are the signs or symptoms?  The main sign of this condition is having too much body fat.  How is this diagnosed?  This condition is diagnosed based on:  · Your BMI. If you are an adult with a BMI of 30 or  higher, you are considered obese.  · Your waist circumference. This measures the distance around your waistline.  · Your skinfold thickness. Your health care provider may gently pinch a fold of your skin and measure it.  You may have other tests to check for underlying conditions.  How is this treated?  Treatment for this condition often includes changing your lifestyle. Treatment may include some or all of the following:  · Dietary changes. This may include developing a healthy meal plan.  · Regular physical activity. This may include activity that causes your heart to beat faster (aerobic exercise) and strength training. Work with your health care provider to design an exercise program that works for you.  · Medicine to help you lose weight if you are unable to lose 1 pound a week after 6 weeks of healthy eating and more physical activity.  · Treating conditions that cause the obesity (underlying conditions).  · Surgery. Surgical options may include gastric banding and gastric bypass. Surgery may be done if:  ? Other treatments have not helped to improve your condition.  ? You have a BMI of 40 or higher.  ? You have life-threatening health problems related to obesity.  Follow these instructions at home:  Eating and drinking    · Follow recommendations from your health care provider about what you eat and drink. Your health care provider may advise you to:  ? Limit fast food, sweets, and processed snack foods.  ? Choose low-fat options, such as low-fat milk instead of whole milk.  ? Eat 5 or more servings of fruits or vegetables every day.  ? Eat at home more often. This gives you more control over what you eat.  ? Choose healthy foods when you eat out.  ? Learn to read food labels. This will help you understand how much food is considered 1 serving.  ? Learn what a healthy serving size is.  ? Keep low-fat snacks available.  ? Limit sugary drinks, such as soda, fruit juice, sweetened iced tea, and flavored  milk.  · Drink enough water to keep your urine pale yellow.  · Do not follow a fad diet. Fad diets can be unhealthy and even dangerous.    Physical activity  · Exercise regularly, as told by your health care provider.  ? Most adults should get up to 150 minutes of moderate-intensity exercise every week.  ? Ask your health care provider what types of exercise are safe for you and how often you should exercise.  · Warm up and stretch before being active.  · Cool down and stretch after being active.  · Rest between periods of activity.  Lifestyle  · Work with your health care provider and a dietitian to set a weight-loss goal that is healthy and reasonable for you.  · Limit your screen time.  · Find ways to reward yourself that do not involve food.  · Do not drink alcohol if:  ? Your health care provider tells you not to drink.  ? You are pregnant, may be pregnant, or are planning to become pregnant.  · If you drink alcohol:  ? Limit how much you use to:  § 0-1 drink a day for women.  § 0-2 drinks a day for men.  ? Be aware of how much alcohol is in your drink. In the [place] one drink equals one 12 oz bottle of beer (355 mL), one 5 oz glass of wine (148 mL), or one 1½ oz glass of hard liquor (44 mL).  General instructions  · Keep a weight-loss journal to keep track of the food you eat and how much exercise you get.  · Take over-the-counter and prescription medicines only as told by your health care provider.  · Take vitamins and supplements only as told by your health care provider.  · Consider joining a support group. Your health care provider may be able to recommend a support group.  · Keep all follow-up visits as told by your health care provider. This is important.  Contact a health care provider if:  · You are unable to meet your weight loss goal after 6 weeks of dietary and lifestyle changes.  Get help right away if you are having:  · Trouble breathing.  · Suicidal thoughts or behaviors.  Summary  · Obesity is  the condition of having too much total body fat.  · Being overweight or obese means that your weight is greater than what is considered healthy for your body size.  · Work with your health care provider and a dietitian to set a weight-loss goal that is healthy and reasonable for you.  · Exercise regularly, as told by your health care provider. Ask your health care provider what types of exercise are safe for you and how often you should exercise.  This information is not intended to replace advice given to you by your health care provider. Make sure you discuss any questions you have with your health care provider.  Document Revised: 08/22/2019 Document Reviewed: 08/22/2019  Elsevier Patient Education © 2021 [employer]

## 2021-10-21 RX ORDER — CALCIUM CARBONATE 500(1250)
TABLET ORAL
Qty: 30 TABLET | Refills: 4 | Status: SHIPPED | OUTPATIENT
Start: 2021-10-21 | End: 2022-03-03 | Stop reason: SDUPTHER

## 2021-11-04 ENCOUNTER — TELEPHONE (OUTPATIENT)
Dept: FAMILY MEDICINE CLINIC | Facility: CLINIC | Age: 61
End: 2021-11-04

## 2021-11-04 RX ORDER — DOCUSATE SODIUM 100 MG/1
CAPSULE, LIQUID FILLED ORAL
Qty: 180 CAPSULE | Refills: 3 | Status: SHIPPED | OUTPATIENT
Start: 2021-11-04 | End: 2022-01-11

## 2021-11-04 NOTE — TELEPHONE ENCOUNTER
Caller: KETANTONI NEETA    Relationship: Emergency Contact    Best call back number: [phone]    Who are you requesting to speak with (clinical staff, provider,  specific staff member): ,MEDICAL STAFF    What was the call regarding: PATIENTS ADULT  WOULD LIKE TO KNOW THE STATUS OF THE NEW SCRIPT FOR docusate sodium (COLACE) 100 MG capsule. EVERYDAY THAT SHE DOESN'T GIVE THE PATIENT THE MEDICATION, DUE TO THE SCRIPT BEING , IT IS A MED ERROR THROUGH THE STATE. ATTEMPTED TO WARM TRANSFER. PLEASE CALL PATIENT TO ADVISE.

## 2021-11-08 NOTE — TELEPHONE ENCOUNTER
CRISTINE CALLED CHECKING ON THE STATUS OF THIS, PLEASE CALL BACK AND ADVISE, THANK YOU.    UNABLE TO WARM TRANSFER

## 2021-11-09 NOTE — TELEPHONE ENCOUNTER
Hub staff attempted to follow warm transfer process and was unsuccessful     Caller: CRISTINE    Relationship to patient: [facility]    Best call back number: [phone]    Patient is needing: CRISTINE HAS CALLED, REQUESTING STATUS OF FAX BEING SIGNED AND SENT BACK TO THEM.  CRISTINE STATED THIS WAS URGENT AND WOULD LIKE FOR THIS TO BE DONE AS SOON AS POSSIBLE.

## 2021-11-15 ENCOUNTER — TELEPHONE (OUTPATIENT)
Dept: FAMILY MEDICINE CLINIC | Facility: CLINIC | Age: 61
End: 2021-11-15

## 2021-11-15 NOTE — TELEPHONE ENCOUNTER
Caller: CRISTINE    Relationship: [facility]     Best call back number: [phone] OR CELL NUMBER [phone]    What is the best time to reach you: ANYTIME     Who are you requesting to speak with (clinical staff, provider,  specific staff member): DR. GRZEGORZ GOEL OR CLINICAL STAFF     What was the call regarding: CRISTINE IS STILL WAITING ON A LAPSE LETTER FOR THE ONE DAY THAT THE MEDICATION WAS ADMINISTRED TO PATIENT AND THEN A NEW PRESCRIPTION WAS WRITTEN.     NEEDS THIS AS SOON AS POSSIBLE.  HAS ASKED FOR THIS PREVIOUSLY BUT HAS NOT RECEIVED IT AS OF YET.     Do you require a callback: YES

## 2021-11-23 NOTE — TELEPHONE ENCOUNTER
Please call and find out more details about this request. As written I do not understand what the request is about. I don't mind to help any way I can. KG

## 2021-12-02 RX ORDER — MELATONIN
1000 DAILY
Qty: 90 TABLET | Refills: 1 | Status: SHIPPED | OUTPATIENT
Start: 2021-12-02 | End: 2022-04-26 | Stop reason: SDUPTHER

## 2021-12-03 RX ORDER — LEVOTHYROXINE SODIUM 0.05 MG/1
50 TABLET ORAL DAILY
Qty: 90 TABLET | Refills: 1 | Status: SHIPPED | OUTPATIENT
Start: 2021-12-03 | End: 2022-05-09 | Stop reason: SDUPTHER

## 2021-12-08 RX ORDER — POLYETHYLENE GLYCOL 3350 17 G/17G
POWDER, FOR SOLUTION ORAL
Qty: 90 EACH | Refills: 3 | Status: SHIPPED | OUTPATIENT
Start: 2021-12-08 | End: 2022-12-08 | Stop reason: SDUPTHER

## 2021-12-29 RX ORDER — DOXYCYCLINE HYCLATE 50 MG/1
CAPSULE, GELATIN COATED ORAL
Qty: 30 TABLET | Refills: 2 | Status: SHIPPED | OUTPATIENT
Start: 2021-12-29 | End: 2022-03-07

## 2022-01-10 RX ORDER — OXCARBAZEPINE 300 MG/1
TABLET, FILM COATED ORAL
Qty: 180 TABLET | Refills: 1 | Status: SHIPPED | OUTPATIENT
Start: 2022-01-10 | End: 2022-07-19

## 2022-01-10 RX ORDER — FOLIC ACID 1 MG/1
TABLET ORAL
Qty: 30 TABLET | Refills: 1 | Status: SHIPPED | OUTPATIENT
Start: 2022-01-10 | End: 2022-03-03

## 2022-01-11 RX ORDER — DOCUSATE SODIUM 100 MG/1
CAPSULE, LIQUID FILLED ORAL
Qty: 180 CAPSULE | Refills: 3 | Status: SHIPPED | OUTPATIENT
Start: 2022-01-11 | End: 2022-07-22 | Stop reason: SDUPTHER

## 2022-01-13 RX ORDER — SENNA PLUS 8.6 MG/1
1 TABLET ORAL 2 TIMES DAILY
Qty: 180 TABLET | Refills: 1 | Status: SHIPPED | OUTPATIENT
Start: 2022-01-13 | End: 2022-07-26 | Stop reason: SDUPTHER

## 2022-03-01 RX ORDER — LANOLIN ALCOHOL/MO/W.PET/CERES
CREAM (GRAM) TOPICAL
Qty: 30 TABLET | Refills: 1 | Status: SHIPPED | OUTPATIENT
Start: 2022-03-01 | End: 2022-04-26 | Stop reason: SDUPTHER

## 2022-03-03 RX ORDER — CALCIUM CARBONATE/VITAMIN D3 500MG-5MCG
TABLET ORAL
Qty: 30 TABLET | Refills: 1 | Status: SHIPPED | OUTPATIENT
Start: 2022-03-03 | End: 2022-05-09 | Stop reason: SDUPTHER

## 2022-03-03 RX ORDER — FOLIC ACID 1 MG/1
TABLET ORAL
Qty: 30 TABLET | Refills: 1 | Status: SHIPPED | OUTPATIENT
Start: 2022-03-03 | End: 2022-05-02

## 2022-03-07 RX ORDER — FERROUS GLUCONATE 324(37.5)
TABLET ORAL
Qty: 90 TABLET | Refills: 2 | Status: SHIPPED | OUTPATIENT
Start: 2022-03-07 | End: 2022-11-14 | Stop reason: SDUPTHER

## 2022-04-26 RX ORDER — MELATONIN
1000 DAILY
Qty: 90 TABLET | Refills: 1 | Status: SHIPPED | OUTPATIENT
Start: 2022-04-26 | End: 2022-10-12

## 2022-04-26 RX ORDER — LANOLIN ALCOHOL/MO/W.PET/CERES
1000 CREAM (GRAM) TOPICAL DAILY
Qty: 30 TABLET | Refills: 1 | Status: SHIPPED | OUTPATIENT
Start: 2022-04-26 | End: 2022-05-09 | Stop reason: SDUPTHER

## 2022-05-02 RX ORDER — FOLIC ACID 1 MG/1
TABLET ORAL
Qty: 30 TABLET | Refills: 1 | Status: SHIPPED | OUTPATIENT
Start: 2022-05-02 | End: 2022-07-05

## 2022-05-09 DIAGNOSIS — I10 ESSENTIAL HYPERTENSION: Primary | ICD-10-CM

## 2022-05-09 DIAGNOSIS — E03.9 ACQUIRED HYPOTHYROIDISM: ICD-10-CM

## 2022-05-09 DIAGNOSIS — E78.00 HYPERCHOLESTEREMIA: ICD-10-CM

## 2022-05-09 RX ORDER — CALCIUM CARBONATE/VITAMIN D3 500MG-5MCG
1 TABLET ORAL DAILY
Qty: 90 TABLET | Refills: 1 | Status: SHIPPED | OUTPATIENT
Start: 2022-05-09 | End: 2022-06-29

## 2022-05-09 RX ORDER — LEVOTHYROXINE SODIUM 0.05 MG/1
50 TABLET ORAL DAILY
Qty: 90 TABLET | Refills: 1 | Status: SHIPPED | OUTPATIENT
Start: 2022-05-09 | End: 2022-11-07

## 2022-05-09 RX ORDER — OMEPRAZOLE 20 MG/1
20 CAPSULE, DELAYED RELEASE ORAL DAILY
Qty: 90 CAPSULE | Refills: 1 | Status: SHIPPED | OUTPATIENT
Start: 2022-05-09 | End: 2022-11-07

## 2022-05-09 RX ORDER — LANOLIN ALCOHOL/MO/W.PET/CERES
1000 CREAM (GRAM) TOPICAL DAILY
Qty: 30 TABLET | Refills: 3 | Status: SHIPPED | OUTPATIENT
Start: 2022-05-09 | End: 2022-09-09 | Stop reason: SDUPTHER

## 2022-05-10 PROBLEM — D64.9 ANEMIA: Status: ACTIVE | Noted: 2022-05-10

## 2022-06-29 DIAGNOSIS — I10 ESSENTIAL HYPERTENSION: ICD-10-CM

## 2022-06-29 DIAGNOSIS — E78.00 HYPERCHOLESTEREMIA: ICD-10-CM

## 2022-06-29 DIAGNOSIS — E03.9 ACQUIRED HYPOTHYROIDISM: ICD-10-CM

## 2022-06-29 RX ORDER — CALCIUM CARBONATE/VITAMIN D3 500MG-5MCG
TABLET ORAL
Qty: 30 TABLET | Refills: 1 | Status: SHIPPED | OUTPATIENT
Start: 2022-06-29 | End: 2022-11-14 | Stop reason: SDUPTHER

## 2022-07-05 RX ORDER — FOLIC ACID 1 MG/1
TABLET ORAL
Qty: 30 TABLET | Refills: 1 | Status: SHIPPED | OUTPATIENT
Start: 2022-07-05 | End: 2022-09-07

## 2022-07-14 ENCOUNTER — LAB (OUTPATIENT)
Dept: LAB | Facility: HOSPITAL | Age: 62
End: 2022-07-14

## 2022-07-14 DIAGNOSIS — R41.82 ALTERED MENTAL STATUS, UNSPECIFIED ALTERED MENTAL STATUS TYPE: Primary | ICD-10-CM

## 2022-07-14 DIAGNOSIS — I10 ESSENTIAL HYPERTENSION: ICD-10-CM

## 2022-07-14 DIAGNOSIS — E78.00 HYPERCHOLESTEREMIA: ICD-10-CM

## 2022-07-14 DIAGNOSIS — E03.9 ACQUIRED HYPOTHYROIDISM: ICD-10-CM

## 2022-07-14 LAB
ALBUMIN SERPL-MCNC: 3.9 G/DL (ref 3.5–5.2)
ALBUMIN/GLOB SERPL: 1.3 G/DL
ALP SERPL-CCNC: 87 U/L (ref 39–117)
ALT SERPL W P-5'-P-CCNC: 10 U/L (ref 1–33)
ANION GAP SERPL CALCULATED.3IONS-SCNC: 8 MMOL/L (ref 5–15)
AST SERPL-CCNC: 29 U/L (ref 1–32)
BACTERIA UR QL AUTO: NORMAL /HPF
BASOPHILS # BLD AUTO: 0.06 10*3/MM3 (ref 0–0.2)
BASOPHILS NFR BLD AUTO: 1.6 % (ref 0–1.5)
BILIRUB SERPL-MCNC: 0.3 MG/DL (ref 0–1.2)
BILIRUB UR QL STRIP: NEGATIVE
BUN SERPL-MCNC: 14 MG/DL (ref 8–23)
BUN/CREAT SERPL: 13.2 (ref 7–25)
CALCIUM SPEC-SCNC: 9.2 MG/DL (ref 8.6–10.5)
CHLORIDE SERPL-SCNC: 101 MMOL/L (ref 98–107)
CHOLEST SERPL-MCNC: 165 MG/DL (ref 0–200)
CLARITY UR: CLEAR
CO2 SERPL-SCNC: 27 MMOL/L (ref 22–29)
COLOR UR: YELLOW
CREAT SERPL-MCNC: 1.06 MG/DL (ref 0.57–1)
DEPRECATED RDW RBC AUTO: 42.4 FL (ref 37–54)
EGFRCR SERPLBLD CKD-EPI 2021: 59.5 ML/MIN/1.73
EOSINOPHIL # BLD AUTO: 0.18 10*3/MM3 (ref 0–0.4)
EOSINOPHIL NFR BLD AUTO: 4.7 % (ref 0.3–6.2)
ERYTHROCYTE [DISTWIDTH] IN BLOOD BY AUTOMATED COUNT: 12.3 % (ref 12.3–15.4)
GLOBULIN UR ELPH-MCNC: 3.1 GM/DL
GLUCOSE SERPL-MCNC: 91 MG/DL (ref 65–99)
GLUCOSE UR STRIP-MCNC: NEGATIVE MG/DL
HCT VFR BLD AUTO: 39 % (ref 34–46.6)
HDLC SERPL-MCNC: 56 MG/DL (ref 40–60)
HGB BLD-MCNC: 12.8 G/DL (ref 12–15.9)
HGB UR QL STRIP.AUTO: NEGATIVE
HYALINE CASTS UR QL AUTO: NORMAL /LPF
IMM GRANULOCYTES # BLD AUTO: 0.01 10*3/MM3 (ref 0–0.05)
IMM GRANULOCYTES NFR BLD AUTO: 0.3 % (ref 0–0.5)
KETONES UR QL STRIP: NEGATIVE
LDLC SERPL CALC-MCNC: 91 MG/DL (ref 0–100)
LDLC/HDLC SERPL: 1.6 {RATIO}
LEUKOCYTE ESTERASE UR QL STRIP.AUTO: ABNORMAL
LYMPHOCYTES # BLD AUTO: 1.61 10*3/MM3 (ref 0.7–3.1)
LYMPHOCYTES NFR BLD AUTO: 42.5 % (ref 19.6–45.3)
MCH RBC QN AUTO: 30.9 PG (ref 26.6–33)
MCHC RBC AUTO-ENTMCNC: 32.8 G/DL (ref 31.5–35.7)
MCV RBC AUTO: 94.2 FL (ref 79–97)
MONOCYTES # BLD AUTO: 0.32 10*3/MM3 (ref 0.1–0.9)
MONOCYTES NFR BLD AUTO: 8.4 % (ref 5–12)
NEUTROPHILS NFR BLD AUTO: 1.61 10*3/MM3 (ref 1.7–7)
NEUTROPHILS NFR BLD AUTO: 42.5 % (ref 42.7–76)
NITRITE UR QL STRIP: NEGATIVE
NRBC BLD AUTO-RTO: 0 /100 WBC (ref 0–0.2)
PH UR STRIP.AUTO: 7 [PH] (ref 5–8)
PLATELET # BLD AUTO: 189 10*3/MM3 (ref 140–450)
PMV BLD AUTO: 10.2 FL (ref 6–12)
POTASSIUM SERPL-SCNC: 4.2 MMOL/L (ref 3.5–5.2)
PROT SERPL-MCNC: 7 G/DL (ref 6–8.5)
PROT UR QL STRIP: NEGATIVE
RBC # BLD AUTO: 4.14 10*6/MM3 (ref 3.77–5.28)
RBC # UR STRIP: NORMAL /HPF
REF LAB TEST METHOD: NORMAL
SODIUM SERPL-SCNC: 136 MMOL/L (ref 136–145)
SP GR UR STRIP: 1.01 (ref 1–1.03)
SQUAMOUS #/AREA URNS HPF: NORMAL /HPF
TRIGL SERPL-MCNC: 96 MG/DL (ref 0–150)
TSH SERPL DL<=0.05 MIU/L-ACNC: 0.93 UIU/ML (ref 0.27–4.2)
UROBILINOGEN UR QL STRIP: ABNORMAL
VLDLC SERPL-MCNC: 18 MG/DL (ref 5–40)
WBC # UR STRIP: NORMAL /HPF
WBC NRBC COR # BLD: 3.79 10*3/MM3 (ref 3.4–10.8)

## 2022-07-14 PROCEDURE — 87086 URINE CULTURE/COLONY COUNT: CPT

## 2022-07-14 PROCEDURE — 80053 COMPREHEN METABOLIC PANEL: CPT

## 2022-07-14 PROCEDURE — 80061 LIPID PANEL: CPT

## 2022-07-14 PROCEDURE — 36415 COLL VENOUS BLD VENIPUNCTURE: CPT

## 2022-07-14 PROCEDURE — 81001 URINALYSIS AUTO W/SCOPE: CPT

## 2022-07-14 PROCEDURE — 85025 COMPLETE CBC W/AUTO DIFF WBC: CPT

## 2022-07-14 PROCEDURE — 84443 ASSAY THYROID STIM HORMONE: CPT

## 2022-07-16 LAB — BACTERIA SPEC AEROBE CULT: NO GROWTH

## 2022-07-19 RX ORDER — OXCARBAZEPINE 300 MG/1
TABLET, FILM COATED ORAL
Qty: 180 TABLET | Refills: 1 | Status: SHIPPED | OUTPATIENT
Start: 2022-07-19 | End: 2023-01-13

## 2022-07-22 ENCOUNTER — TELEPHONE (OUTPATIENT)
Dept: FAMILY MEDICINE CLINIC | Facility: CLINIC | Age: 62
End: 2022-07-22

## 2022-07-22 RX ORDER — DOCUSATE SODIUM 100 MG/1
100 CAPSULE, LIQUID FILLED ORAL 2 TIMES DAILY
Qty: 180 CAPSULE | Refills: 3 | Status: SHIPPED | OUTPATIENT
Start: 2022-07-22

## 2022-07-26 RX ORDER — SENNA PLUS 8.6 MG/1
1 TABLET ORAL 2 TIMES DAILY
Qty: 180 TABLET | Refills: 1 | Status: SHIPPED | OUTPATIENT
Start: 2022-07-26 | End: 2022-12-20

## 2022-08-01 ENCOUNTER — TELEPHONE (OUTPATIENT)
Dept: FAMILY MEDICINE CLINIC | Facility: CLINIC | Age: 62
End: 2022-08-01

## 2022-08-31 RX ORDER — LISINOPRIL 5 MG/1
TABLET ORAL
Qty: 90 TABLET | Refills: 3 | Status: SHIPPED | OUTPATIENT
Start: 2022-08-31

## 2022-08-31 RX ORDER — ATORVASTATIN CALCIUM 20 MG/1
TABLET, FILM COATED ORAL
Qty: 90 TABLET | Refills: 3 | Status: SHIPPED | OUTPATIENT
Start: 2022-08-31 | End: 2023-01-09

## 2022-09-07 RX ORDER — FOLIC ACID 1 MG/1
TABLET ORAL
Qty: 30 TABLET | Refills: 1 | Status: SHIPPED | OUTPATIENT
Start: 2022-09-07 | End: 2022-11-07

## 2022-09-09 DIAGNOSIS — E78.00 HYPERCHOLESTEREMIA: ICD-10-CM

## 2022-09-09 DIAGNOSIS — I10 ESSENTIAL HYPERTENSION: ICD-10-CM

## 2022-09-09 DIAGNOSIS — E03.9 ACQUIRED HYPOTHYROIDISM: ICD-10-CM

## 2022-09-09 RX ORDER — LANOLIN ALCOHOL/MO/W.PET/CERES
1000 CREAM (GRAM) TOPICAL DAILY
Qty: 30 TABLET | Refills: 3 | Status: SHIPPED | OUTPATIENT
Start: 2022-09-09 | End: 2023-01-10

## 2022-09-09 RX ORDER — FERROUS SULFATE 324(65)MG
324 TABLET, DELAYED RELEASE (ENTERIC COATED) ORAL
Qty: 30 TABLET | Refills: 1 | Status: SHIPPED | OUTPATIENT
Start: 2022-09-09

## 2022-09-09 NOTE — TELEPHONE ENCOUNTER
Caller: NEETA HAND    Relationship: Emergency Contact    Best call back number: [phone]     Requested Prescriptions:   Requested Prescriptions     Pending Prescriptions Disp Refills   • vitamin B-12 (CYANOCOBALAMIN) 1000 MCG tablet 30 tablet 3     Sig: Take 1 tablet by mouth Daily.   • ferrous sulfate 324 MG tablet delayed-release 30 tablet         Pharmacy where request should be sent: [facility] [address] [facility].  [phone] [facility] [phone] [facility]     Additional details provided by patient:OUT OF MEDICATION     Does the patient have less than a 3 day supply:  [x] Yes  [] No    Kailash Nagy Rep   09/09/22 11:46 EDT

## 2022-09-29 ENCOUNTER — OFFICE VISIT (OUTPATIENT)
Dept: FAMILY MEDICINE CLINIC | Facility: CLINIC | Age: 62
End: 2022-09-29

## 2022-09-29 VITALS
WEIGHT: 179.9 LBS | RESPIRATION RATE: 18 BRPM | TEMPERATURE: 97.2 F | BODY MASS INDEX: 31.88 KG/M2 | HEIGHT: 63 IN | HEART RATE: 62 BPM | OXYGEN SATURATION: 98 % | DIASTOLIC BLOOD PRESSURE: 69 MMHG | SYSTOLIC BLOOD PRESSURE: 133 MMHG

## 2022-09-29 DIAGNOSIS — Z12.31 ENCOUNTER FOR SCREENING MAMMOGRAM FOR MALIGNANT NEOPLASM OF BREAST: ICD-10-CM

## 2022-09-29 DIAGNOSIS — E66.09 CLASS 1 OBESITY DUE TO EXCESS CALORIES WITH SERIOUS COMORBIDITY AND BODY MASS INDEX (BMI) OF 31.0 TO 31.9 IN ADULT: Chronic | ICD-10-CM

## 2022-09-29 DIAGNOSIS — Z00.00 MEDICARE ANNUAL WELLNESS VISIT, SUBSEQUENT: Primary | ICD-10-CM

## 2022-09-29 PROBLEM — E66.811 CLASS 1 OBESITY DUE TO EXCESS CALORIES WITHOUT SERIOUS COMORBIDITY WITH BODY MASS INDEX (BMI) OF 30.0 TO 30.9 IN ADULT: Chronic | Status: RESOLVED | Noted: 2021-09-27 | Resolved: 2022-09-29

## 2022-09-29 PROBLEM — S62.301D: Status: RESOLVED | Noted: 2021-06-18 | Resolved: 2022-09-29

## 2022-09-29 PROBLEM — E66.811 CLASS 1 OBESITY DUE TO EXCESS CALORIES WITH SERIOUS COMORBIDITY AND BODY MASS INDEX (BMI) OF 31.0 TO 31.9 IN ADULT: Status: ACTIVE | Noted: 2021-09-27

## 2022-09-29 PROBLEM — S62.303D: Status: RESOLVED | Noted: 2021-06-18 | Resolved: 2022-09-29

## 2022-09-29 PROCEDURE — 1160F RVW MEDS BY RX/DR IN RCRD: CPT | Performed by: FAMILY MEDICINE

## 2022-09-29 PROCEDURE — G0439 PPPS, SUBSEQ VISIT: HCPCS | Performed by: FAMILY MEDICINE

## 2022-09-29 PROCEDURE — 1170F FXNL STATUS ASSESSED: CPT | Performed by: FAMILY MEDICINE

## 2022-09-29 PROCEDURE — 1126F AMNT PAIN NOTED NONE PRSNT: CPT | Performed by: FAMILY MEDICINE

## 2022-09-29 NOTE — PROGRESS NOTES
The ABCs of the Annual Wellness Visit  Subsequent Medicare Wellness Visit    Chief Complaint   Patient presents with   • Medicare Wellness-subsequent      Subjective    History of Present Illness:  Gela Michaud is a 62 y.o. female who presents for a Subsequent Medicare Wellness Visit.    The following portions of the patient's history were reviewed and   updated as appropriate: allergies, current medications, past family history, past medical history, past social history, past surgical history and problem list.    Compared to one year ago, the patient feels her physical   health is the same.    Compared to one year ago, the patient feels her mental   health is the same.    Recent Hospitalizations:  She was not admitted to the hospital during the last year.       Current Medical Providers:  Patient Care Team:  Tonie Duran DO as PCP - General (Family Medicine)    Outpatient Medications Prior to Visit   Medication Sig Dispense Refill   • atorvastatin (LIPITOR) 20 MG tablet TAKE ONE TABLET BY MOUTH EVERY NIGHT FOR CHOLESTEROL. 90 tablet 3   • benztropine (COGENTIN) 1 MG tablet Take 2 mg by mouth 2 (Two) Times a Day.     • cholecalciferol (VITAMIN D3) 25 MCG (1000 UT) tablet Take 1 tablet by mouth Daily. 90 tablet 1   • citalopram (CeleXA) 20 MG tablet Take 20 mg by mouth Daily.     • docusate sodium (COLACE) 100 MG capsule Take 1 capsule by mouth 2 (Two) Times a Day. 180 capsule 3   • dorzolamide (TRUSOPT) 2 % ophthalmic solution 1 drop 2 (Two) Times a Day.     • ferrous gluconate 324 (37.5 Fe) MG tablet tablet TAKE ONE TABLET BY MOUTH ONCE DAILY 90 tablet 2   • ferrous sulfate 324 MG tablet delayed-release Take 1 tablet by mouth Daily With Breakfast. 30 tablet 1   • folic acid (FOLVITE) 1 MG tablet TAKE ONE TABLET BY MOUTH ONCE DAILY 30 tablet 1   • haloperidol (HALDOL) 10 MG tablet Take 10 mg by mouth 2 (Two) Times a Day.     • levothyroxine (SYNTHROID, LEVOTHROID) 50 MCG tablet Take 1 tablet by mouth Daily.  Take on empty stomach daily at 07:00 90 tablet 1   • lisinopril (PRINIVIL,ZESTRIL) 5 MG tablet TAKE ONE TABLET BY MOUTH ONCE DAILY 90 tablet 3   • OLANZapine (zyPREXA) 5 MG tablet Take 5 mg by mouth 2 (two) times a day.     • omeprazole (priLOSEC) 20 MG capsule Take 1 capsule by mouth Daily. 20 mg PO AC BK 90 capsule 1   • OXcarbazepine (TRILEPTAL) 300 MG tablet TAKE ONE TABLET BY MOUTH TWICE A  tablet 1   • OYSCO 500 + D 500-200 MG-UNIT tablet TAKE ONE TABLET BY MOUTH ONCE DAILY 30 tablet 1   • polyethylene glycol (MIRALAX) 17 g packet TAKE 17 GRAM MIXED WITH 8 OZ. WATER, JUICE, SODA, COFFEE OR TEA BY ORAL ROUTE ONCE DAILY FOR 30 DAYS 90 each 3   • polyethylene glycol (MiraLax) 17 GM/SCOOP powder 17 g.     • senna (Senokot) 8.6 MG tablet Take 1 tablet by mouth 2 (Two) Times a Day. 180 tablet 1   • Vitamin A 3 MG (53784 UT) capsule Take 10,000 Units by mouth Daily.     • vitamin B-12 (CYANOCOBALAMIN) 1000 MCG tablet Take 1 tablet by mouth Daily. 30 tablet 3     No facility-administered medications prior to visit.       No opioid medication identified on active medication list. I have reviewed chart for other potential  high risk medication/s and harmful drug interactions in the elderly.          Aspirin is not on active medication list.  Aspirin use is not indicated based on review of current medical condition/s. Risk of harm outweighs potential benefits.  .    Patient Active Problem List   Diagnosis   • Altered mental state   • Hypercholesteremia   • Essential hypertension   • Acute hepatitis   • Acquired hypothyroidism   • GERD (gastroesophageal reflux disease)   • Seizure disorder (HCC)   • Migraine headache   • Pain of left hand   • Medicare annual wellness visit, subsequent   • Class 1 obesity due to excess calories with serious comorbidity and body mass index (BMI) of 31.0 to 31.9 in adult   • Anemia     Advance Care Planning  She is in care of the state.           Objective    Vitals:    09/29/22 1438  "  BP: 133/69   BP Location: Left arm   Patient Position: Sitting   Pulse: 62   Resp: 18   Temp: 97.2 °F (36.2 °C)   SpO2: 98%   Weight: 81.6 kg (179 lb 14.4 oz)   Height: 160 cm (62.99\")   PainSc: 0-No pain     Estimated body mass index is 31.88 kg/m² as calculated from the following:    Height as of this encounter: 160 cm (62.99\").    Weight as of this encounter: 81.6 kg (179 lb 14.4 oz).    BMI is >= 30 and <35. (Class 1 Obesity). The following options were offered after discussion;: nutrition counseling/recommendations      Does the patient have evidence of cognitive impairment? No    Physical Exam  Lab Results   Component Value Date    TRIG 96 07/14/2022    HDL 56 07/14/2022    LDL 91 07/14/2022    VLDL 18 07/14/2022            HEALTH RISK ASSESSMENT    Smoking Status:  Social History     Tobacco Use   Smoking Status Never Smoker   Smokeless Tobacco Never Used   Tobacco Comment    second hand smoke exposure-never     Alcohol Consumption:  Social History     Substance and Sexual Activity   Alcohol Use Never     Fall Risk Screen:    STEADI Fall Risk Assessment was completed, and patient is at MODERATE risk for falls. Assessment completed on:9/29/2022    Depression Screening:  PHQ-2/PHQ-9 Depression Screening 9/29/2022   Retired PHQ-9 Total Score -   Retired Total Score -   Little Interest or Pleasure in Doing Things 0-->not at all   Feeling Down, Depressed or Hopeless 0-->not at all   PHQ-9: Brief Depression Severity Measure Score 0       Health Habits and Functional and Cognitive Screening:  Functional & Cognitive Status 9/29/2022   Do you have difficulty preparing food and eating? Yes   Do you have difficulty bathing yourself, getting dressed or grooming yourself? Yes   Do you have difficulty using the toilet? No   Do you have difficulty moving around from place to place? No   Do you have trouble with steps or getting out of a bed or a chair? Yes   Current Diet Well Balanced Diet   Dental Exam Up to date   Eye " Exam Up to date   Exercise (times per week) 7 times per week   Current Exercises Include Walking   Do you need help using the phone?  Yes   Are you deaf or do you have serious difficulty hearing?  No   Do you need help with transportation? Yes   Do you need help shopping? Yes   Do you need help preparing meals?  Yes   Do you need help with housework?  Yes   Do you need help with laundry? Yes   Do you need help taking your medications? Yes   Do you need help managing money? Yes   Do you ever drive or ride in a car without wearing a seat belt? No       Age-appropriate Screening Schedule:  Refer to the list below for future screening recommendations based on patient's age, sex and/or medical conditions. Orders for these recommended tests are listed in the plan section. The patient has been provided with a written plan.    Health Maintenance   Topic Date Due   • MAMMOGRAM  06/22/2018   • INFLUENZA VACCINE  10/29/2022 (Originally 8/1/2022)   • TDAP/TD VACCINES (1 - Tdap) 10/29/2022 (Originally [birth year]/16/1979)   • ZOSTER VACCINE (1 of 2) 10/29/2022 (Originally 1/16/2010)   • LIPID PANEL  07/14/2023              Assessment & Plan   CMS Preventative Services Quick Reference  Risk Factors Identified During Encounter  Obesity/Overweight   The above risks/problems have been discussed with the patient.  Follow up actions/plans if indicated are seen below in the Assessment/Plan Section.  Pertinent information has been shared with the patient in the After Visit Summary.    Diagnoses and all orders for this visit:    1. Medicare annual wellness visit, subsequent (Primary)    2. Encounter for screening mammogram for malignant neoplasm of breast  -     Mammo Screening Digital Tomosynthesis Bilateral With CAD; Future    3. Class 1 obesity due to excess calories with serious comorbidity and body mass index (BMI) of 31.0 to 31.9 in adult  Assessment & Plan:  Patient's (Body mass index is 31.88 kg/m².) indicates that they are obese (BMI >30)  with health conditions that include hypertension and dyslipidemias . Weight is unchanged. BMI is is above average; BMI management plan is completed. We discussed low calorie, low carb based diet program, portion control and increasing exercise.         Follow Up:   Return in about 6 months (around 3/29/2023).     An After Visit Summary and PPPS were made available to the patient.

## 2022-09-29 NOTE — ASSESSMENT & PLAN NOTE
Patient's (Body mass index is 31.88 kg/m².) indicates that they are obese (BMI >30) with health conditions that include hypertension and dyslipidemias . Weight is unchanged. BMI is is above average; BMI management plan is completed. We discussed low calorie, low carb based diet program, portion control and increasing exercise.

## 2022-10-12 RX ORDER — VITAMIN B COMPLEX
TABLET ORAL
Qty: 90 TABLET | Refills: 1 | Status: SHIPPED | OUTPATIENT
Start: 2022-10-12

## 2022-11-04 DIAGNOSIS — E78.00 HYPERCHOLESTEREMIA: ICD-10-CM

## 2022-11-04 DIAGNOSIS — I10 ESSENTIAL HYPERTENSION: ICD-10-CM

## 2022-11-04 DIAGNOSIS — E03.9 ACQUIRED HYPOTHYROIDISM: ICD-10-CM

## 2022-11-07 RX ORDER — LEVOTHYROXINE SODIUM 0.05 MG/1
50 TABLET ORAL DAILY
Qty: 90 TABLET | Refills: 1 | Status: SHIPPED | OUTPATIENT
Start: 2022-11-07

## 2022-11-07 RX ORDER — FOLIC ACID 1 MG/1
TABLET ORAL
Qty: 30 TABLET | Refills: 1 | Status: SHIPPED | OUTPATIENT
Start: 2022-11-07 | End: 2023-01-09

## 2022-11-07 RX ORDER — OMEPRAZOLE 20 MG/1
CAPSULE, DELAYED RELEASE ORAL
Qty: 90 CAPSULE | Refills: 1 | Status: SHIPPED | OUTPATIENT
Start: 2022-11-07

## 2022-11-14 DIAGNOSIS — I10 ESSENTIAL HYPERTENSION: ICD-10-CM

## 2022-11-14 DIAGNOSIS — E78.00 HYPERCHOLESTEREMIA: ICD-10-CM

## 2022-11-14 DIAGNOSIS — E03.9 ACQUIRED HYPOTHYROIDISM: ICD-10-CM

## 2022-11-14 RX ORDER — CALCIUM CARBONATE/VITAMIN D3 500MG-5MCG
1 TABLET ORAL DAILY
Qty: 30 TABLET | Refills: 2 | Status: SHIPPED | OUTPATIENT
Start: 2022-11-14 | End: 2023-02-08 | Stop reason: SDUPTHER

## 2022-11-14 RX ORDER — FERROUS GLUCONATE 324(37.5)
324 TABLET ORAL DAILY
Qty: 90 TABLET | Refills: 0 | Status: SHIPPED | OUTPATIENT
Start: 2022-11-14 | End: 2023-02-08 | Stop reason: SDUPTHER

## 2022-12-11 RX ORDER — POLYETHYLENE GLYCOL 3350 17 G/17G
17 POWDER, FOR SOLUTION ORAL DAILY
Qty: 90 EACH | Refills: 1 | Status: SHIPPED | OUTPATIENT
Start: 2022-12-11

## 2022-12-20 RX ORDER — SENNOSIDES 8.6 MG/1
TABLET ORAL
Qty: 60 TABLET | Refills: 1 | Status: SHIPPED | OUTPATIENT
Start: 2022-12-20 | End: 2023-01-09

## 2023-01-09 DIAGNOSIS — E78.00 HYPERCHOLESTEREMIA: ICD-10-CM

## 2023-01-09 DIAGNOSIS — E03.9 ACQUIRED HYPOTHYROIDISM: ICD-10-CM

## 2023-01-09 DIAGNOSIS — I10 ESSENTIAL HYPERTENSION: ICD-10-CM

## 2023-01-09 RX ORDER — SENNOSIDES 8.6 MG/1
TABLET ORAL
Qty: 180 TABLET | Refills: 4 | Status: SHIPPED | OUTPATIENT
Start: 2023-01-09 | End: 2023-02-21

## 2023-01-09 RX ORDER — FOLIC ACID 1 MG/1
TABLET ORAL
Qty: 30 TABLET | Refills: 1 | Status: SHIPPED | OUTPATIENT
Start: 2023-01-09 | End: 2023-02-21

## 2023-01-09 RX ORDER — ATORVASTATIN CALCIUM 20 MG/1
TABLET, FILM COATED ORAL
Qty: 90 TABLET | Refills: 3 | Status: SHIPPED | OUTPATIENT
Start: 2023-01-09

## 2023-01-10 RX ORDER — LANOLIN ALCOHOL/MO/W.PET/CERES
1000 CREAM (GRAM) TOPICAL DAILY
Qty: 30 TABLET | Refills: 5 | Status: SHIPPED | OUTPATIENT
Start: 2023-01-10 | End: 2023-01-13

## 2023-01-12 DIAGNOSIS — I10 ESSENTIAL HYPERTENSION: ICD-10-CM

## 2023-01-12 DIAGNOSIS — E03.9 ACQUIRED HYPOTHYROIDISM: ICD-10-CM

## 2023-01-12 DIAGNOSIS — E78.00 HYPERCHOLESTEREMIA: ICD-10-CM

## 2023-01-13 RX ORDER — LANOLIN ALCOHOL/MO/W.PET/CERES
1000 CREAM (GRAM) TOPICAL DAILY
Qty: 30 TABLET | Refills: 3 | Status: SHIPPED | OUTPATIENT
Start: 2023-01-13

## 2023-01-13 RX ORDER — OXCARBAZEPINE 300 MG/1
TABLET, FILM COATED ORAL
Qty: 180 TABLET | Refills: 1 | Status: SHIPPED | OUTPATIENT
Start: 2023-01-13

## 2023-02-08 DIAGNOSIS — E03.9 ACQUIRED HYPOTHYROIDISM: ICD-10-CM

## 2023-02-08 DIAGNOSIS — I10 ESSENTIAL HYPERTENSION: ICD-10-CM

## 2023-02-08 DIAGNOSIS — E78.00 HYPERCHOLESTEREMIA: ICD-10-CM

## 2023-02-08 RX ORDER — FERROUS GLUCONATE 324(37.5)
324 TABLET ORAL DAILY
Qty: 90 TABLET | Refills: 0 | Status: SHIPPED | OUTPATIENT
Start: 2023-02-08

## 2023-02-08 RX ORDER — CALCIUM CARBONATE/VITAMIN D3 500MG-5MCG
1 TABLET ORAL DAILY
Qty: 30 TABLET | Refills: 5 | Status: SHIPPED | OUTPATIENT
Start: 2023-02-08

## 2023-02-21 RX ORDER — SENNOSIDES 8.6 MG/1
TABLET ORAL
Qty: 180 TABLET | Refills: 4 | Status: SHIPPED | OUTPATIENT
Start: 2023-02-21 | End: 2023-03-14

## 2023-02-21 RX ORDER — FOLIC ACID 1 MG/1
TABLET ORAL
Qty: 30 TABLET | Refills: 1 | Status: SHIPPED | OUTPATIENT
Start: 2023-02-21

## 2023-03-14 RX ORDER — SENNOSIDES 8.6 MG/1
TABLET ORAL
Qty: 60 TABLET | Refills: 1 | Status: SHIPPED | OUTPATIENT
Start: 2023-03-14

## 2023-04-14 DIAGNOSIS — I10 ESSENTIAL HYPERTENSION: ICD-10-CM

## 2023-04-14 DIAGNOSIS — E78.00 HYPERCHOLESTEREMIA: ICD-10-CM

## 2023-04-14 DIAGNOSIS — E03.9 ACQUIRED HYPOTHYROIDISM: ICD-10-CM

## 2023-04-14 RX ORDER — SENNA PLUS 8.6 MG/1
1 TABLET ORAL 2 TIMES DAILY
Qty: 180 TABLET | Refills: 5 | Status: SHIPPED | OUTPATIENT
Start: 2023-04-14

## 2023-04-14 RX ORDER — FERROUS GLUCONATE 324(37.5)
324 TABLET ORAL DAILY
Qty: 90 TABLET | Refills: 5 | Status: SHIPPED | OUTPATIENT
Start: 2023-04-14

## 2023-04-14 RX ORDER — OMEPRAZOLE 20 MG/1
20 CAPSULE, DELAYED RELEASE ORAL DAILY
Qty: 90 CAPSULE | Refills: 5 | Status: SHIPPED | OUTPATIENT
Start: 2023-04-14

## 2023-04-14 RX ORDER — MELATONIN
1000 DAILY
Qty: 90 TABLET | Refills: 5 | Status: SHIPPED | OUTPATIENT
Start: 2023-04-14

## 2023-04-14 RX ORDER — LEVOTHYROXINE SODIUM 0.05 MG/1
50 TABLET ORAL DAILY
Qty: 90 TABLET | Refills: 5 | Status: SHIPPED | OUTPATIENT
Start: 2023-04-14

## 2023-04-14 RX ORDER — FOLIC ACID 1 MG/1
1000 TABLET ORAL DAILY
Qty: 90 TABLET | Refills: 5 | Status: SHIPPED | OUTPATIENT
Start: 2023-04-14

## 2023-04-14 NOTE — TELEPHONE ENCOUNTER
Patient is needing medication refills sent to [facility].    What Is The Patient's Gender: Male

## 2023-06-05 RX ORDER — POLYETHYLENE GLYCOL 3350 17 G/17G
17 POWDER, FOR SOLUTION ORAL DAILY
Qty: 90 EACH | Refills: 1 | Status: SHIPPED | OUTPATIENT
Start: 2023-06-05

## 2023-07-15 NOTE — PROGRESS NOTES
Patient needing lab refills, sent over to pharmacy. Patient also has active lab orders open. Patients care giver aware and will bring patient in for labs  
Tone Jones

## 2023-08-01 ENCOUNTER — APPOINTMENT (OUTPATIENT)
Dept: GENERAL RADIOLOGY | Facility: HOSPITAL | Age: 63
End: 2023-08-01
Payer: MEDICARE

## 2023-08-01 LAB
ALBUMIN SERPL-MCNC: 4.1 G/DL (ref 3.5–5.2)
ALBUMIN/GLOB SERPL: 1.4 G/DL
ALP SERPL-CCNC: 95 U/L (ref 39–117)
ALT SERPL W P-5'-P-CCNC: 16 U/L (ref 1–33)
ANION GAP SERPL CALCULATED.3IONS-SCNC: 11.5 MMOL/L (ref 5–15)
AST SERPL-CCNC: 46 U/L (ref 1–32)
BASOPHILS # BLD AUTO: 0.04 10*3/MM3 (ref 0–0.2)
BASOPHILS NFR BLD AUTO: 0.6 % (ref 0–1.5)
BILIRUB SERPL-MCNC: 0.2 MG/DL (ref 0–1.2)
BUN SERPL-MCNC: 17 MG/DL (ref 8–23)
BUN/CREAT SERPL: 14.9 (ref 7–25)
CALCIUM SPEC-SCNC: 9.3 MG/DL (ref 8.6–10.5)
CHLORIDE SERPL-SCNC: 103 MMOL/L (ref 98–107)
CO2 SERPL-SCNC: 23.5 MMOL/L (ref 22–29)
CREAT SERPL-MCNC: 1.14 MG/DL (ref 0.57–1)
D-LACTATE SERPL-SCNC: 1.6 MMOL/L (ref 0.5–2)
DEPRECATED RDW RBC AUTO: 47 FL (ref 37–54)
EGFRCR SERPLBLD CKD-EPI 2021: 54.2 ML/MIN/1.73
EOSINOPHIL # BLD AUTO: 0.15 10*3/MM3 (ref 0–0.4)
EOSINOPHIL NFR BLD AUTO: 2.3 % (ref 0.3–6.2)
ERYTHROCYTE [DISTWIDTH] IN BLOOD BY AUTOMATED COUNT: 13.2 % (ref 12.3–15.4)
GLOBULIN UR ELPH-MCNC: 3 GM/DL
GLUCOSE SERPL-MCNC: 119 MG/DL (ref 65–99)
HCT VFR BLD AUTO: 38.4 % (ref 34–46.6)
HGB BLD-MCNC: 12.4 G/DL (ref 12–15.9)
HOLD SPECIMEN: NORMAL
HOLD SPECIMEN: NORMAL
IMM GRANULOCYTES # BLD AUTO: 0.01 10*3/MM3 (ref 0–0.05)
IMM GRANULOCYTES NFR BLD AUTO: 0.2 % (ref 0–0.5)
LIPASE SERPL-CCNC: 41 U/L (ref 13–60)
LYMPHOCYTES # BLD AUTO: 1.47 10*3/MM3 (ref 0.7–3.1)
LYMPHOCYTES NFR BLD AUTO: 22.2 % (ref 19.6–45.3)
MCH RBC QN AUTO: 30.8 PG (ref 26.6–33)
MCHC RBC AUTO-ENTMCNC: 32.3 G/DL (ref 31.5–35.7)
MCV RBC AUTO: 95.5 FL (ref 79–97)
MONOCYTES # BLD AUTO: 0.58 10*3/MM3 (ref 0.1–0.9)
MONOCYTES NFR BLD AUTO: 8.8 % (ref 5–12)
NEUTROPHILS NFR BLD AUTO: 4.36 10*3/MM3 (ref 1.7–7)
NEUTROPHILS NFR BLD AUTO: 65.9 % (ref 42.7–76)
NRBC BLD AUTO-RTO: 0 /100 WBC (ref 0–0.2)
PLATELET # BLD AUTO: 195 10*3/MM3 (ref 140–450)
PMV BLD AUTO: 10.3 FL (ref 6–12)
POTASSIUM SERPL-SCNC: 4.5 MMOL/L (ref 3.5–5.2)
PROT SERPL-MCNC: 7.1 G/DL (ref 6–8.5)
RBC # BLD AUTO: 4.02 10*6/MM3 (ref 3.77–5.28)
SODIUM SERPL-SCNC: 138 MMOL/L (ref 136–145)
WBC NRBC COR # BLD: 6.61 10*3/MM3 (ref 3.4–10.8)
WHOLE BLOOD HOLD COAG: NORMAL
WHOLE BLOOD HOLD SPECIMEN: NORMAL

## 2023-08-01 PROCEDURE — 83605 ASSAY OF LACTIC ACID: CPT

## 2023-08-01 PROCEDURE — 80053 COMPREHEN METABOLIC PANEL: CPT

## 2023-08-01 PROCEDURE — 99285 EMERGENCY DEPT VISIT HI MDM: CPT

## 2023-08-01 PROCEDURE — 85025 COMPLETE CBC W/AUTO DIFF WBC: CPT

## 2023-08-01 PROCEDURE — 83690 ASSAY OF LIPASE: CPT

## 2023-08-01 PROCEDURE — 36415 COLL VENOUS BLD VENIPUNCTURE: CPT

## 2023-08-01 PROCEDURE — 51702 INSERT TEMP BLADDER CATH: CPT

## 2023-08-01 RX ORDER — SODIUM CHLORIDE 0.9 % (FLUSH) 0.9 %
10 SYRINGE (ML) INJECTION AS NEEDED
Status: DISCONTINUED | OUTPATIENT
Start: 2023-08-01 | End: 2023-08-02 | Stop reason: HOSPADM

## 2023-08-02 ENCOUNTER — APPOINTMENT (OUTPATIENT)
Dept: GENERAL RADIOLOGY | Facility: HOSPITAL | Age: 63
End: 2023-08-02
Payer: MEDICARE

## 2023-08-02 ENCOUNTER — APPOINTMENT (OUTPATIENT)
Dept: CT IMAGING | Facility: HOSPITAL | Age: 63
End: 2023-08-02
Payer: MEDICARE

## 2023-08-02 ENCOUNTER — HOSPITAL ENCOUNTER (EMERGENCY)
Facility: HOSPITAL | Age: 63
Discharge: HOME | End: 2023-08-02
Attending: EMERGENCY MEDICINE | Admitting: EMERGENCY MEDICINE
Payer: MEDICARE

## 2023-08-02 VITALS
WEIGHT: 179.45 LBS | RESPIRATION RATE: 16 BRPM | HEIGHT: 64 IN | DIASTOLIC BLOOD PRESSURE: 76 MMHG | HEART RATE: 76 BPM | TEMPERATURE: 99.4 F | OXYGEN SATURATION: 95 % | SYSTOLIC BLOOD PRESSURE: 128 MMHG | BODY MASS INDEX: 30.64 KG/M2

## 2023-08-02 DIAGNOSIS — S90.32XA CONTUSION OF LEFT FOOT, INITIAL ENCOUNTER: ICD-10-CM

## 2023-08-02 DIAGNOSIS — R33.9 URINARY RETENTION: Primary | ICD-10-CM

## 2023-08-02 LAB
BILIRUB UR QL STRIP: NEGATIVE
CLARITY UR: CLEAR
COLOR UR: YELLOW
GLUCOSE UR STRIP-MCNC: NEGATIVE MG/DL
HGB UR QL STRIP.AUTO: NEGATIVE
KETONES UR QL STRIP: ABNORMAL
LEUKOCYTE ESTERASE UR QL STRIP.AUTO: NEGATIVE
NITRITE UR QL STRIP: NEGATIVE
PH UR STRIP.AUTO: 6 [PH] (ref 5–8)
PROT UR QL STRIP: ABNORMAL
SP GR UR STRIP: 1.02 (ref 1–1.03)
UROBILINOGEN UR QL STRIP: ABNORMAL

## 2023-08-02 PROCEDURE — 73630 X-RAY EXAM OF FOOT: CPT

## 2023-08-02 PROCEDURE — 25510000001 IOPAMIDOL PER 1 ML: Performed by: EMERGENCY MEDICINE

## 2023-08-02 PROCEDURE — 81003 URINALYSIS AUTO W/O SCOPE: CPT | Performed by: EMERGENCY MEDICINE

## 2023-08-02 PROCEDURE — 74177 CT ABD & PELVIS W/CONTRAST: CPT

## 2023-08-02 PROCEDURE — 96372 THER/PROPH/DIAG INJ SC/IM: CPT

## 2023-08-02 PROCEDURE — 25010000002 ZIPRASIDONE MESYLATE PER 10 MG: Performed by: NURSE PRACTITIONER

## 2023-08-02 RX ORDER — ZIPRASIDONE MESYLATE 20 MG/ML
10 INJECTION, POWDER, LYOPHILIZED, FOR SOLUTION INTRAMUSCULAR ONCE
Status: COMPLETED | OUTPATIENT
Start: 2023-08-02 | End: 2023-08-02

## 2023-08-02 RX ADMIN — ZIPRASIDONE MESYLATE 10 MG: 20 INJECTION, POWDER, LYOPHILIZED, FOR SOLUTION INTRAMUSCULAR at 04:12

## 2023-08-02 RX ADMIN — IOPAMIDOL 100 ML: 755 INJECTION, SOLUTION INTRAVENOUS at 05:50

## 2023-08-02 NOTE — ED PROVIDER NOTES
"Time: 10:29 PM EDT  Date of encounter:  8/1/2023  Independent Historian/Clinical History and Information was obtained by:   Patient and Family    History is limited by:  Developmental delay    Chief Complaint: Dysuria abdominal and back pain      History of Present Illness:  Patient is a 63 y.o. year old female who presents to the emergency department for evaluation of low back pain, low pelvic/vaginal pain, and decrease in urination.  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  Her caregiver reports that she has not had any urine output for 24 hours.  She advises that she will sit on the toilet and try to pee but cannot and starts crying.  She also advises that she recently had a \"mental state\" and she had to have all of her medications changed.  She has had a urinary tract infections in the past.  Patient also has bruising on the top of her left foot and has been complaining of pain but her caretaker does not know what may have happened.    HPI    Patient Care Team  Primary Care Provider: Tonie Duran DO    Past Medical History:     Allergies   Allergen Reactions    Lorazepam Unknown - High Severity     Past Medical History:   Diagnosis Date    Abdominal bloating     Acid reflux     Anemia     Anxiety     Condition not found     Mental Retardation    Foot pain, bilateral     High blood pressure     High cholesterol     Hyperlipemia     Hypertension     Hypothyroidism     Ingrowing toenail     Mood disorder     Neurologic disorder     Seizure     Thyroid disorder     Thyroid trouble     Tinea unguium      Past Surgical History:   Procedure Laterality Date    ABDOMINAL HYSTERECTOMY      COLONOSCOPY  2018    ENDOSCOPY  2018     Family History   Problem Relation Age of Onset    No Known Problems Mother     No Known Problems Father        Home Medications:  Prior to Admission medications    Medication Sig Start Date End Date Taking? Authorizing Provider   atorvastatin (LIPITOR) 20 MG tablet " TAKE ONE TABLET BY MOUTH EVERY NIGHT FOR CHOLESTEROL. 1/9/23   Tonie Duran DO   benztropine (COGENTIN) 2 MG tablet Take 1 tablet by mouth 2 (Two) Times a Day. 6/1/23   Keith Capone MD   Calcium Carb-Cholecalciferol (OYSCO 500 + D) 500-5 MG-MCG tablet per tablet Take 1 tablet by mouth Daily. 2/8/23   Tonie Duran DO   cholecalciferol (Vitamin D) 25 MCG (1000 UT) tablet Take 1 tablet by mouth Daily. 4/14/23   Tonie Duran DO   citalopram (CeleXA) 20 MG tablet Take 1 tablet by mouth Daily.    Keith Capone MD   docusate sodium (COLACE) 100 MG capsule Take 1 capsule by mouth 2 (Two) Times a Day. 7/3/23   Tonie Duran DO   dorzolamide (TRUSOPT) 2 % ophthalmic solution 1 drop 2 (Two) Times a Day.    Keith Capone MD   ferrous sulfate 324 MG tablet delayed-release Take 1 tablet by mouth Daily With Breakfast. 9/9/22   Tonie Duran DO   folic acid (FOLVITE) 1 MG tablet Take 1 tablet by mouth Daily. 4/14/23   Tonie Duran DO   haloperidol (HALDOL) 10 MG tablet Take 1 tablet by mouth 2 (Two) Times a Day.    Keith Capone MD   levothyroxine (SYNTHROID, LEVOTHROID) 50 MCG tablet Take 1 tablet by mouth Daily. Take on empty stomach daily at 07:00 4/14/23   Tonie Duran DO   lisinopril (PRINIVIL,ZESTRIL) 5 MG tablet TAKE ONE TABLET BY MOUTH ONCE DAILY 8/31/22   Tonie Duran DO   meclizine (ANTIVERT) 25 MG tablet Take 1 tablet by mouth 3 (Three) Times a Day As Needed for Dizziness. 7/4/23   Lena Russo APRN   OLANZapine (zyPREXA) 5 MG tablet Take 1 tablet by mouth 2 (two) times a day.    Keith Capone MD   omeprazole (priLOSEC) 20 MG capsule Take 1 capsule by mouth Daily. 4/14/23   Tonie Duran DO   OXcarbazepine (TRILEPTAL) 300 MG tablet TAKE ONE TABLET BY MOUTH TWICE A DAY 6/30/23   Tonie Duran DO   polyethylene glycol (MIRALAX) 17 g packet Take 17 g by mouth Daily. 6/5/23   Tonie Duran DO   senna (HM Senna) 8.6 MG tablet Take 1 tablet by mouth 2 (Two) Times a  "Day. 4/14/23   Tonie Duran DO   sulfamethoxazole-trimethoprim (Bactrim DS) 800-160 MG per tablet Take 1 tablet by mouth 2 (Two) Times a Day. 6/28/23   Tonie Duran DO   Vitamin A 3 MG (49600 UT) capsule Take 1 capsule by mouth Daily.    Provider, MD Keith   vitamin B-12 (CYANOCOBALAMIN) 1000 MCG tablet Take 1 tablet by mouth Daily. 7/3/23   Tonie Duran DO        Social History:   Social History     Tobacco Use    Smoking status: Never    Smokeless tobacco: Never    Tobacco comments:     second hand smoke exposure-never   Vaping Use    Vaping Use: Never used   Substance Use Topics    Alcohol use: Never    Drug use: Never         Review of Systems:  Review of Systems   Constitutional:  Negative for chills and fever.   HENT:  Negative for congestion, ear pain and sore throat.    Eyes:  Negative for pain.   Respiratory:  Negative for cough, chest tightness and shortness of breath.    Cardiovascular:  Negative for chest pain.   Gastrointestinal:  Positive for abdominal pain. Negative for diarrhea, nausea and vomiting.   Genitourinary:  Positive for decreased urine volume, difficulty urinating and pelvic pain. Negative for flank pain and hematuria.   Musculoskeletal:  Positive for back pain. Negative for joint swelling.   Skin:  Negative for pallor.   Neurological:  Negative for seizures and headaches.   Hematological: Negative.    Psychiatric/Behavioral: Negative.     All other systems reviewed and are negative.     Physical Exam:  /76 (BP Location: Right arm, Patient Position: Lying)   Pulse 76   Temp 99.4 øF (37.4 øC) (Oral)   Resp 16   Ht 162.6 cm (64\")   SpO2 95%   BMI 30.81 kg/mý     Physical Exam  Vitals and nursing note reviewed.   Constitutional:       General: She is not in acute distress.     Appearance: Normal appearance. She is not toxic-appearing.   HENT:      Head: Normocephalic and atraumatic.      Mouth/Throat:      Mouth: Mucous membranes are moist.   Eyes:      General: No " scleral icterus.  Cardiovascular:      Rate and Rhythm: Normal rate and regular rhythm.      Pulses: Normal pulses.      Heart sounds: Normal heart sounds.   Pulmonary:      Effort: Pulmonary effort is normal. No respiratory distress.      Breath sounds: Normal breath sounds.   Abdominal:      General: Bowel sounds are normal. There is no distension.      Palpations: Abdomen is soft.      Tenderness: There is abdominal tenderness in the right lower quadrant, suprapubic area and left lower quadrant. There is no right CVA tenderness or left CVA tenderness.   Musculoskeletal:         General: Normal range of motion.      Cervical back: Normal range of motion and neck supple.   Skin:     General: Skin is warm and dry.      Findings: Bruising (Left dorsal foot) present.      Comments: Light purplish bruising to left foot.  No known cause or injury   Neurological:      Mental Status: She is alert and oriented to person, place, and time. Mental status is at baseline.   Psychiatric:         Mood and Affect: Mood normal.         Behavior: Behavior normal.              Procedures:  Procedures      Medical Decision Making:      Comorbidities that affect care:    Developmental delay, Hypertension, Thyroid Disease    External Notes reviewed:    Previous ED Note: Patient seen here on July 3 for psychosis and constipation      The following orders were placed and all results were independently analyzed by me:  Orders Placed This Encounter   Procedures    XR Foot 3+ View Left    CT Abdomen Pelvis With Contrast    [place] Draw    Comprehensive Metabolic Panel    Lipase    Urinalysis With Microscopic If Indicated (No Culture) - Urine, Clean Catch    Lactic Acid, Plasma    CBC Auto Differential    Ambulatory Referral to Urology    NPO Diet NPO Type: Strict NPO    Undress & Gown    Bladder scan    Straight cath    Insert Indwelling Urinary Catheter    Assess Need for Indwelling Urinary Catheter - Follow Removal Protocol    Urinary  Catheter Care    Leg Bag During The Day    Insert Peripheral IV    CBC & Differential    Green Top (Gel)    Lavender Top    Gold Top - SST    Light Blue Top       Medications Given in the Emergency Department:  Medications   sodium chloride 0.9 % flush 10 mL (has no administration in time range)   ziprasidone (GEODON) injection 10 mg (10 mg Intramuscular Given 8/2/23 0412)   iopamidol (ISOVUE-370) 76 % injection 100 mL (100 mL Intravenous Given 8/2/23 0589)        ED Course:    ED Course as of 08/02/23 0623   Wed Aug 02, 2023   0424 Bladder scan shows greater than 710 mL retention [DS]   0444 XR Foot 3+ View Left  No acute fracture [DS]      ED Course User Index  [DS] Joy Guaman APRN       Labs:    Lab Results (last 24 hours)       Procedure Component Value Units Date/Time    CBC & Differential [[id number]]  (Normal) Collected: 08/01/23 2236    Specimen: Blood Updated: 08/01/23 2253    Narrative:      The following orders were created for panel order CBC & Differential.  Procedure                               Abnormality         Status                     ---------                               -----------         ------                     CBC Auto Differential[[id number]]        Normal              Final result                 Please view results for these tests on the individual orders.    Comprehensive Metabolic Panel [[id number]]  (Abnormal) Collected: 08/01/23 2236    Specimen: Blood Updated: 08/01/23 2311     Glucose 119 mg/dL      BUN 17 mg/dL      Creatinine 1.14 mg/dL      Sodium 138 mmol/L      Potassium 4.5 mmol/L      Chloride 103 mmol/L      CO2 23.5 mmol/L      Calcium 9.3 mg/dL      Total Protein 7.1 g/dL      Albumin 4.1 g/dL      ALT (SGPT) 16 U/L      AST (SGOT) 46 U/L      Alkaline Phosphatase 95 U/L      Total Bilirubin 0.2 mg/dL      Globulin 3.0 gm/dL      A/G Ratio 1.4 g/dL      BUN/Creatinine Ratio 14.9     Anion Gap 11.5 mmol/L      eGFR 54.2 mL/min/1.73     Narrative:      GFR Normal  >60  Chronic Kidney Disease <60  Kidney Failure <15      Lipase [[id number]]  (Normal) Collected: 08/01/23 2236    Specimen: Blood Updated: 08/01/23 2311     Lipase 41 U/L     Lactic Acid, Plasma [[id number]]  (Normal) Collected: 08/01/23 2236    Specimen: Blood Updated: 08/01/23 2308     Lactate 1.6 mmol/L     CBC Auto Differential [[id number]]  (Normal) Collected: 08/01/23 2236    Specimen: Blood Updated: 08/01/23 2253     WBC 6.61 10*3/mm3      RBC 4.02 10*6/mm3      Hemoglobin 12.4 g/dL      Hematocrit 38.4 %      MCV 95.5 fL      MCH 30.8 pg      MCHC 32.3 g/dL      RDW 13.2 %      RDW-SD 47.0 fl      MPV 10.3 fL      Platelets 195 10*3/mm3      Neutrophil % 65.9 %      Lymphocyte % 22.2 %      Monocyte % 8.8 %      Eosinophil % 2.3 %      Basophil % 0.6 %      Immature Grans % 0.2 %      Neutrophils, Absolute 4.36 10*3/mm3      Lymphocytes, Absolute 1.47 10*3/mm3      Monocytes, Absolute 0.58 10*3/mm3      Eosinophils, Absolute 0.15 10*3/mm3      Basophils, Absolute 0.04 10*3/mm3      Immature Grans, Absolute 0.01 10*3/mm3      nRBC 0.0 /100 WBC     Urinalysis With Microscopic If Indicated (No Culture) - Indwelling Urethral Catheter [[id number]]  (Abnormal) Collected: 08/02/23 0447    Specimen: Urine from Indwelling Urethral Catheter Updated: 08/02/23 0506     Color, UA Yellow     Appearance, UA Clear     pH, UA 6.0     Specific Gravity, UA 1.022     Glucose, UA Negative     Ketones, UA Trace     Bilirubin, UA Negative     Blood, UA Negative     Protein, UA Trace     Leuk Esterase, UA Negative     Nitrite, UA Negative     Urobilinogen, UA 1.0 E.U./dL    Narrative:      Urine microscopic not indicated.             Imaging:    XR Foot 3+ View Left    Result Date: 8/2/2023  PROCEDURE: XR FOOT 3+ VW LEFT  COMPARISON: None.  INDICATIONS: LEFT FOOT PAIN; UNEXPLAINED BRUISING.  FINDINGS: 3 views were obtained.  No acute fracture or acute malalignment is identified.  Degenerative and enthesopathic changes involve the left  foot and the left ankle.  No retained radiopaque foreign body.  No subcutaneous emphysema.  No definite radiographic evidence of osteomyelitis.  There may be chronic posttraumatic deformity of the medial malleolus.  If symptoms or clinical concerns persist, consider imaging follow-up.       No acute fracture or acute malalignment is identified.     Please note that portions of this note were completed with a voice recognition program.  RADHA CONNOLLY JR, MD       Electronically Signed and Approved By: RADHA CONNOLLY JR, MD on 8/02/2023 at 4:31              CT Abdomen Pelvis With Contrast    Result Date: 8/2/2023  PROCEDURE: CT ABDOMEN PELVIS W CONTRAST  COMPARISON: 7/4/2023.  INDICATIONS: Abdominal pain, not otherwise specified; urinary difficulty.  TECHNIQUE: After obtaining the patient's consent, 617 CT images were created with non-ionic intravenous contrast material.  No oral contrast agent was administered for the study.  PROTOCOL:   Standard CT imaging protocol performed.    RADIATION:   Total DLP: 1,264.2 mGy*cm.   Automated exposure control was utilized to minimize radiation dose. CONTRAST: 100 mL Isovue 370 I.V.  FINDINGS: Interval placement of a urinary bladder catheter is noted.  The urinary bladder is underdistended.  The minimal gas in the urinary bladder lumen is probably related to the catheterization.  There is distended bowel, which is suggestive of a generalized adynamic ileus.  A mechanical bowel obstruction is thought to be less likely.  There is an incidental [place] configuration of the hepatic flexure of colon, seen previously.  The maximum diameter of colon is about 9 cm.  The maximum transverse diameter of small bowel is about 3.5 cm.  The stomach is distended with an air-fluid level.  There is a small hiatal hernia.  There is diffuse hepatic steatosis.  No hepatomegaly is suggested.  No splenomegaly.  No acute pancreatitis.  No gallstones or acute cholecystitis.  The patient's upper  extremities of scan field of view obscure detail.  The exam was not tailored in order to evaluate the patient's upper extremities.  No hydronephrosis or obstructive uropathy.  No acute pyelonephritis.  No enhanced CT evidence of nephrolithiasis or ureterolithiasis.  Degenerative changes are seen throughout the imaged spine.  There may be diffuse idiopathic skeletal hyperostosis (DISH).  Degenerative changes involve the hip joints and the bilateral sacroiliac (SI) joints.  No acute fracture.  No aggressive osseous lesion is suggested.  There is mild lateral curvature of the lumbar spine with the convexity to the right, which may be fixed or positional in nature.  No significant change is suggested in the 2.3 cm left adrenal nodule.  It has a relative washout value is 82.6 percent.  It is most likely a benign adenoma.  No definite right adrenal nodule.  No acute infiltrate is seen in the partially imaged lung bases.  There may be mild subsegmental atelectasis.       There has been interval placement of a urinary bladder catheter with subsequent decompression of the urinary bladder.  A generalized adynamic ileus is suggested.  A mechanical bowel obstruction is thought to be less likely.  No pneumoperitoneum or pneumatosis.  No other definite acute findings are seen.  Please see above comments for further detail.    Please note that portions of this note were completed with a voice recognition program.  RADHA CONNOLLY JR, MD       Electronically Signed and Approved By: RADHA CONNOLLY JR, MD on 8/02/2023 at 6:12                 Differential Diagnosis and Discussion:    Dysuria: Differential diagnosis includes but is not limited to urethritis, cystitis, pyelonephritis, ureteral calculi, neoplasm, chemical irritant, urethral stricture, and trauma    All labs were reviewed and interpreted by me.  All X-rays impressions were independently interpreted by me.    MDM  Number of Diagnoses or Management Options  Contusion of left  foot, initial encounter  Urinary retention  Diagnosis management comments: I have explained the patient's condition, diagnoses and treatment plan based on the information available to me at this time. I have answered questions and addressed any concerns. The patient has a good  understanding of the patient's diagnosis, condition, and treatment plan as can be expected at this point. The vital signs have been stable. The patient's condition is stable and appropriate for discharge from the emergency department.      The patient will pursue further outpatient evaluation with the primary care physician or other designated or consulting physician as outlined in the discharge instructions. They are agreeable to this plan of care and follow-up instructions have been explained in detail. The patient has received these instructions in written format and have expressed an understanding of the discharge instructions. The patient is aware that any significant change in condition or worsening of symptoms should prompt an immediate return to this or the closest emergency department or call to 911.         Amount and/or Complexity of Data Reviewed  Clinical lab tests: reviewed and ordered  Tests in the radiology section of [facility]: reviewed and ordered  Tests in the medicine section of [facility]: ordered and reviewed  Decide to obtain previous medical records or to obtain history from someone other than the patient: yes  Obtain history from someone other than the patient: yes (caregiver)  Review and summarize past medical records: yes (Patient seen here for psychosis and constipation on July 3)    Risk of Complications, Morbidity, and/or Mortality  Presenting problems: moderate  Diagnostic procedures: moderate  Management options: low    Patient Progress  Patient progress: stable       Patient Care Considerations:    CONSULT: I considered consulting urology, however patient has normal kidney function and no sign of UTI.  Patient will be  sent home with Aburto and urology referral      Consultants/Shared Management Plan:    None    Social Determinants of Health:    Patient has presented with family members who are responsible, reliable and will ensure follow up care.      Disposition and Care Coordination:    Discharged: I considered escalation of care by admitting this patient for observation, however the patient has improved and is suitable and  stable for discharge.    I have explained the patient's condition, diagnoses and treatment plan based on the information available to me at this time. I have answered questions and addressed any concerns. The patient has a good  understanding of the patient's diagnosis, condition, and treatment plan as can be expected at this point. The vital signs have been stable. The patient's condition is stable and appropriate for discharge from the emergency department.      The patient will pursue further outpatient evaluation with the primary care physician or other designated or consulting physician as outlined in the discharge instructions. They are agreeable to this plan of care and follow-up instructions have been explained in detail. The patient has received these instructions in written format and have expressed an understanding of the discharge instructions. The patient is aware that any significant change in condition or worsening of symptoms should prompt an immediate return to this or the closest emergency department or call to 911.  I have explained discharge medications and the need for follow up with the patient/caretakers. This was also printed in the discharge instructions. Patient was discharged with the following medications and follow up:      Medication List      No changes were made to your prescriptions during this visit.      Nishant Alexander MD  [address] [facility]  [place] [address]  [phone]             Final diagnoses:   Urinary retention   Contusion of left foot, initial encounter         ED Disposition       ED Disposition   Discharge    Condition   Stable    Comment   --               This medical record created using voice recognition software.             Joy Guaman, SHADI  08/02/23 0619

## 2023-08-02 NOTE — DISCHARGE INSTRUCTIONS
CT scan was unremarkable and did not show any acute abnormality.  There is no sign of infection on your urine.    The reason for your urinary retention is unknown.    Keep Aburto catheter in place until you follow-up with urologist to discuss.  Call today for next available appointment.  Referral has been sent

## 2023-08-06 ENCOUNTER — APPOINTMENT (OUTPATIENT)
Dept: CT IMAGING | Facility: HOSPITAL | Age: 63
End: 2023-08-06
Payer: MEDICARE

## 2023-08-06 ENCOUNTER — APPOINTMENT (OUTPATIENT)
Dept: GENERAL RADIOLOGY | Facility: HOSPITAL | Age: 63
End: 2023-08-06
Payer: MEDICARE

## 2023-08-06 ENCOUNTER — HOSPITAL ENCOUNTER (EMERGENCY)
Facility: HOSPITAL | Age: 63
Discharge: HOME | End: 2023-08-06
Attending: EMERGENCY MEDICINE | Admitting: EMERGENCY MEDICINE
Payer: MEDICARE

## 2023-08-06 VITALS
BODY MASS INDEX: 30.78 KG/M2 | TEMPERATURE: 99 F | RESPIRATION RATE: 16 BRPM | HEART RATE: 62 BPM | OXYGEN SATURATION: 97 % | DIASTOLIC BLOOD PRESSURE: 85 MMHG | WEIGHT: 173.72 LBS | SYSTOLIC BLOOD PRESSURE: 134 MMHG | HEIGHT: 63 IN

## 2023-08-06 DIAGNOSIS — N12 PYELONEPHRITIS: ICD-10-CM

## 2023-08-06 DIAGNOSIS — N39.0 URINARY TRACT INFECTION WITHOUT HEMATURIA, SITE UNSPECIFIED: Primary | ICD-10-CM

## 2023-08-06 DIAGNOSIS — R44.3 HALLUCINATIONS: ICD-10-CM

## 2023-08-06 LAB
ALBUMIN SERPL-MCNC: 3.6 G/DL (ref 3.5–5.2)
ALBUMIN/GLOB SERPL: 1.3 G/DL
ALP SERPL-CCNC: 84 U/L (ref 39–117)
ALT SERPL W P-5'-P-CCNC: 9 U/L (ref 1–33)
ANION GAP SERPL CALCULATED.3IONS-SCNC: 12.3 MMOL/L (ref 5–15)
AST SERPL-CCNC: 29 U/L (ref 1–32)
BACTERIA UR QL AUTO: ABNORMAL /HPF
BASOPHILS # BLD AUTO: 0.03 10*3/MM3 (ref 0–0.2)
BASOPHILS NFR BLD AUTO: 0.3 % (ref 0–1.5)
BILIRUB SERPL-MCNC: 0.2 MG/DL (ref 0–1.2)
BILIRUB UR QL STRIP: NEGATIVE
BUN SERPL-MCNC: 7 MG/DL (ref 8–23)
BUN/CREAT SERPL: 6.4 (ref 7–25)
CALCIUM SPEC-SCNC: 8.9 MG/DL (ref 8.6–10.5)
CHLORIDE SERPL-SCNC: 106 MMOL/L (ref 98–107)
CLARITY UR: ABNORMAL
CO2 SERPL-SCNC: 24.7 MMOL/L (ref 22–29)
COLOR UR: ABNORMAL
CREAT SERPL-MCNC: 1.09 MG/DL (ref 0.57–1)
D-LACTATE SERPL-SCNC: 0.8 MMOL/L (ref 0.5–2)
DEPRECATED RDW RBC AUTO: 46.5 FL (ref 37–54)
EGFRCR SERPLBLD CKD-EPI 2021: 57.2 ML/MIN/1.73
EOSINOPHIL # BLD AUTO: 0.15 10*3/MM3 (ref 0–0.4)
EOSINOPHIL NFR BLD AUTO: 1.7 % (ref 0.3–6.2)
ERYTHROCYTE [DISTWIDTH] IN BLOOD BY AUTOMATED COUNT: 13.3 % (ref 12.3–15.4)
GLOBULIN UR ELPH-MCNC: 2.7 GM/DL
GLUCOSE SERPL-MCNC: 136 MG/DL (ref 65–99)
GLUCOSE UR STRIP-MCNC: NEGATIVE MG/DL
HCT VFR BLD AUTO: 35.8 % (ref 34–46.6)
HGB BLD-MCNC: 11.8 G/DL (ref 12–15.9)
HGB UR QL STRIP.AUTO: ABNORMAL
HOLD SPECIMEN: NORMAL
HOLD SPECIMEN: NORMAL
HYALINE CASTS UR QL AUTO: ABNORMAL /LPF
IMM GRANULOCYTES # BLD AUTO: 0.02 10*3/MM3 (ref 0–0.05)
IMM GRANULOCYTES NFR BLD AUTO: 0.2 % (ref 0–0.5)
KETONES UR QL STRIP: ABNORMAL
LEUKOCYTE ESTERASE UR QL STRIP.AUTO: ABNORMAL
LYMPHOCYTES # BLD AUTO: 1.15 10*3/MM3 (ref 0.7–3.1)
LYMPHOCYTES NFR BLD AUTO: 13.1 % (ref 19.6–45.3)
MAGNESIUM SERPL-MCNC: 1.9 MG/DL (ref 1.6–2.4)
MCH RBC QN AUTO: 31.4 PG (ref 26.6–33)
MCHC RBC AUTO-ENTMCNC: 33 G/DL (ref 31.5–35.7)
MCV RBC AUTO: 95.2 FL (ref 79–97)
MONOCYTES # BLD AUTO: 0.56 10*3/MM3 (ref 0.1–0.9)
MONOCYTES NFR BLD AUTO: 6.4 % (ref 5–12)
NEUTROPHILS NFR BLD AUTO: 6.84 10*3/MM3 (ref 1.7–7)
NEUTROPHILS NFR BLD AUTO: 78.3 % (ref 42.7–76)
NITRITE UR QL STRIP: POSITIVE
NRBC BLD AUTO-RTO: 0 /100 WBC (ref 0–0.2)
PH UR STRIP.AUTO: 6.5 [PH] (ref 5–8)
PLATELET # BLD AUTO: 176 10*3/MM3 (ref 140–450)
PMV BLD AUTO: 10.4 FL (ref 6–12)
POTASSIUM SERPL-SCNC: 3.9 MMOL/L (ref 3.5–5.2)
PROT SERPL-MCNC: 6.3 G/DL (ref 6–8.5)
PROT UR QL STRIP: ABNORMAL
RBC # BLD AUTO: 3.76 10*6/MM3 (ref 3.77–5.28)
RBC # UR STRIP: ABNORMAL /HPF
REF LAB TEST METHOD: ABNORMAL
SODIUM SERPL-SCNC: 143 MMOL/L (ref 136–145)
SP GR UR STRIP: 1.02 (ref 1–1.03)
SQUAMOUS #/AREA URNS HPF: ABNORMAL /HPF
T4 FREE SERPL-MCNC: 1.25 NG/DL (ref 0.93–1.7)
TROPONIN T SERPL HS-MCNC: 17 NG/L
TSH SERPL DL<=0.05 MIU/L-ACNC: 1.28 UIU/ML (ref 0.27–4.2)
UROBILINOGEN UR QL STRIP: ABNORMAL
VALPROATE SERPL-MCNC: 60.2 MCG/ML (ref 50–125)
WBC # UR STRIP: ABNORMAL /HPF
WBC NRBC COR # BLD: 8.75 10*3/MM3 (ref 3.4–10.8)
WHOLE BLOOD HOLD COAG: NORMAL
WHOLE BLOOD HOLD SPECIMEN: NORMAL

## 2023-08-06 PROCEDURE — 71045 X-RAY EXAM CHEST 1 VIEW: CPT

## 2023-08-06 PROCEDURE — 25010000002 ONDANSETRON PER 1 MG: Performed by: EMERGENCY MEDICINE

## 2023-08-06 PROCEDURE — 74177 CT ABD & PELVIS W/CONTRAST: CPT

## 2023-08-06 PROCEDURE — 83605 ASSAY OF LACTIC ACID: CPT | Performed by: EMERGENCY MEDICINE

## 2023-08-06 PROCEDURE — 93005 ELECTROCARDIOGRAM TRACING: CPT

## 2023-08-06 PROCEDURE — 96375 TX/PRO/DX INJ NEW DRUG ADDON: CPT

## 2023-08-06 PROCEDURE — 99285 EMERGENCY DEPT VISIT HI MDM: CPT

## 2023-08-06 PROCEDURE — 84484 ASSAY OF TROPONIN QUANT: CPT

## 2023-08-06 PROCEDURE — 80053 COMPREHEN METABOLIC PANEL: CPT

## 2023-08-06 PROCEDURE — 87040 BLOOD CULTURE FOR BACTERIA: CPT | Performed by: EMERGENCY MEDICINE

## 2023-08-06 PROCEDURE — 93005 ELECTROCARDIOGRAM TRACING: CPT | Performed by: EMERGENCY MEDICINE

## 2023-08-06 PROCEDURE — 84439 ASSAY OF FREE THYROXINE: CPT | Performed by: EMERGENCY MEDICINE

## 2023-08-06 PROCEDURE — 25510000001 IOPAMIDOL PER 1 ML: Performed by: EMERGENCY MEDICINE

## 2023-08-06 PROCEDURE — 84443 ASSAY THYROID STIM HORMONE: CPT | Performed by: EMERGENCY MEDICINE

## 2023-08-06 PROCEDURE — 25010000002 CEFTRIAXONE PER 250 MG: Performed by: EMERGENCY MEDICINE

## 2023-08-06 PROCEDURE — 96365 THER/PROPH/DIAG IV INF INIT: CPT

## 2023-08-06 PROCEDURE — 83735 ASSAY OF MAGNESIUM: CPT

## 2023-08-06 PROCEDURE — 85025 COMPLETE CBC W/AUTO DIFF WBC: CPT

## 2023-08-06 PROCEDURE — 36415 COLL VENOUS BLD VENIPUNCTURE: CPT

## 2023-08-06 PROCEDURE — 81001 URINALYSIS AUTO W/SCOPE: CPT

## 2023-08-06 PROCEDURE — 80164 ASSAY DIPROPYLACETIC ACD TOT: CPT | Performed by: EMERGENCY MEDICINE

## 2023-08-06 PROCEDURE — 93010 ELECTROCARDIOGRAM REPORT: CPT | Performed by: INTERNAL MEDICINE

## 2023-08-06 RX ORDER — DIVALPROEX SODIUM 250 MG/1
250 TABLET, DELAYED RELEASE ORAL 2 TIMES DAILY
COMMUNITY
Start: 2023-07-20

## 2023-08-06 RX ORDER — CEPHALEXIN 500 MG/1
500 CAPSULE ORAL 4 TIMES DAILY
Qty: 28 CAPSULE | Refills: 0 | Status: SHIPPED | OUTPATIENT
Start: 2023-08-06 | End: 2023-08-13

## 2023-08-06 RX ORDER — CEFTRIAXONE SODIUM 1 G/50ML
1000 INJECTION, SOLUTION INTRAVENOUS ONCE
Status: COMPLETED | OUTPATIENT
Start: 2023-08-06 | End: 2023-08-06

## 2023-08-06 RX ORDER — SODIUM CHLORIDE 0.9 % (FLUSH) 0.9 %
10 SYRINGE (ML) INJECTION AS NEEDED
Status: DISCONTINUED | OUTPATIENT
Start: 2023-08-06 | End: 2023-08-06 | Stop reason: HOSPADM

## 2023-08-06 RX ORDER — ONDANSETRON 2 MG/ML
4 INJECTION INTRAMUSCULAR; INTRAVENOUS ONCE
Status: COMPLETED | OUTPATIENT
Start: 2023-08-06 | End: 2023-08-06

## 2023-08-06 RX ADMIN — IOPAMIDOL 100 ML: 755 INJECTION, SOLUTION INTRAVENOUS at 17:50

## 2023-08-06 RX ADMIN — ONDANSETRON 4 MG: 2 INJECTION INTRAMUSCULAR; INTRAVENOUS at 17:01

## 2023-08-06 RX ADMIN — SODIUM CHLORIDE 1000 ML: 9 INJECTION, SOLUTION INTRAVENOUS at 17:00

## 2023-08-06 RX ADMIN — CEFTRIAXONE SODIUM 1000 MG: 1 INJECTION, SOLUTION INTRAVENOUS at 17:28

## 2023-08-06 NOTE — DISCHARGE INSTRUCTIONS
Please follow-up with your doctor in 48 hours to review the urine culture that was performed today to determine if the proper antibiotics were prescribed    Please follow-up with your psychiatrist week to review your psychiatric medication and determine if possible adjustments still need to be made    Please return to emergency room for worsening altered mental status, intractable vomiting, fever, near passing out, passing out, chest pain, chest pressure, intractable abdominal pain or any new symptoms you are concerned

## 2023-08-06 NOTE — ED PROVIDER NOTES
Time: 4:46 PM EDT  Date of encounter:  8/6/2023  Independent Historian/Clinical History and Information was obtained by:   Patient    History is limited by: Cognitive Impairment    Chief complaint: Hazy urine and hallucinations        History of Present Illness:  Patient is a 63 y.o. year old female who presents to the emergency department for evaluation of hallucinations x1 month.  History is provided by adult foster care giver.  Caregiver states patient has been dealing with urinary tract infections intermittently for the past month.  States that the patient has been seen multiple times and trialed Bactrim and Macrobid for her symptoms.  States that her last visit in the emergency department a Aburto catheter was put in due to urinary retention.  The patient does have an appointment with the urologist, Dr. Alexander for follow-up.  Patient's caregiver states that she has now noticed blood and white clumps in the catheter.  Caregiver states that patient's mental status had gotten briefly better however has now worsened again.  States that at baseline patient has intellectual disabilities and multiple psychiatric illnesses.  States that patient was hallucinating this morning saying that she sees snakes around her.  The patient has not complained of headache.  The patient has had intermittent vomiting for the last month after eating.  The patient also has had several loose stools a day.  She denies any hematochezia or melena.  The patient's had no documented fever.  The patient has not complained of any abdominal pain.  The patient has recently been placed on Geodon.  There did seem to be some help at first but the caregiver states that she is now back to the hallucinations.  The caregiver also notes paranoia    HPI    Patient Care Team  Primary Care Provider: Tonie Duran DO    Past Medical History:     Allergies   Allergen Reactions    Lorazepam Unknown - High Severity     Past Medical History:   Diagnosis Date     Abdominal bloating     Acid reflux     Anemia     Anxiety     Condition not found     Mental Retardation    Foot pain, bilateral     High blood pressure     High cholesterol     Hyperlipemia     Hypertension     Hypothyroidism     Ingrowing toenail     Mood disorder     Neurologic disorder     Seizure     Thyroid disorder     Thyroid trouble     Tinea unguium      Past Surgical History:   Procedure Laterality Date    ABDOMINAL HYSTERECTOMY      COLONOSCOPY  2018    ENDOSCOPY  2018     Family History   Problem Relation Age of Onset    No Known Problems Mother     No Known Problems Father        Home Medications:  Prior to Admission medications    Medication Sig Start Date End Date Taking? Authorizing Provider   atorvastatin (LIPITOR) 20 MG tablet TAKE ONE TABLET BY MOUTH EVERY NIGHT FOR CHOLESTEROL. 1/9/23   Tonie Duran DO   benztropine (COGENTIN) 2 MG tablet Take 1 tablet by mouth 2 (Two) Times a Day. 6/1/23   Keith Capone MD   Calcium Carb-Cholecalciferol (OYSCO 500 + D) 500-5 MG-MCG tablet per tablet Take 1 tablet by mouth Daily. 2/8/23   Tonie Duran DO   cholecalciferol (Vitamin D) 25 MCG (1000 UT) tablet Take 1 tablet by mouth Daily. 4/14/23   Tonie Duran DO   citalopram (CeleXA) 20 MG tablet Take 1 tablet by mouth Daily.    Keith Capone MD   docusate sodium (COLACE) 100 MG capsule Take 1 capsule by mouth 2 (Two) Times a Day. 7/3/23   Tonie Duran DO   dorzolamide (TRUSOPT) 2 % ophthalmic solution 1 drop 2 (Two) Times a Day.    Keith Capone MD   ferrous sulfate 324 MG tablet delayed-release Take 1 tablet by mouth Daily With Breakfast. 9/9/22   Tonie Duran DO   folic acid (FOLVITE) 1 MG tablet Take 1 tablet by mouth Daily. 4/14/23   Tonie Duran DO   haloperidol (HALDOL) 10 MG tablet Take 1 tablet by mouth 2 (Two) Times a Day.    Keith Capone MD   levothyroxine (SYNTHROID, LEVOTHROID) 50 MCG tablet Take 1 tablet by mouth Daily. Take on empty stomach daily at  07:00 4/14/23   Tonie Duran DO   lisinopril (PRINIVIL,ZESTRIL) 5 MG tablet TAKE ONE TABLET BY MOUTH ONCE DAILY 8/31/22   Tonie Duran DO   meclizine (ANTIVERT) 25 MG tablet Take 1 tablet by mouth 3 (Three) Times a Day As Needed for Dizziness. 7/4/23   Lena Russo APRN   OLANZapine (zyPREXA) 5 MG tablet Take 1 tablet by mouth 2 (two) times a day.    ProviderKeith MD   omeprazole (priLOSEC) 20 MG capsule Take 1 capsule by mouth Daily. 4/14/23   Tonie Duran DO   OXcarbazepine (TRILEPTAL) 300 MG tablet TAKE ONE TABLET BY MOUTH TWICE A DAY 6/30/23   Tonie Duran DO   polyethylene glycol (MIRALAX) 17 g packet Take 17 g by mouth Daily. 6/5/23   Tonie Duran DO   senna (HM Senna) 8.6 MG tablet Take 1 tablet by mouth 2 (Two) Times a Day. 4/14/23   Tonie Duran DO   sulfamethoxazole-trimethoprim (Bactrim DS) 800-160 MG per tablet Take 1 tablet by mouth 2 (Two) Times a Day. 6/28/23   Tonie Duran DO   Vitamin A 3 MG (35036 UT) capsule Take 1 capsule by mouth Daily.    ProviderKeith MD   vitamin B-12 (CYANOCOBALAMIN) 1000 MCG tablet Take 1 tablet by mouth Daily. 7/3/23   Tonie Duran DO        Social History:   Social History     Tobacco Use    Smoking status: Never    Smokeless tobacco: Never    Tobacco comments:     second hand smoke exposure-never   Vaping Use    Vaping Use: Never used   Substance Use Topics    Alcohol use: Never    Drug use: Never         Review of Systems:  Review of Systems   Constitutional:  Negative for chills, diaphoresis and fever.   HENT:  Negative for congestion, postnasal drip, rhinorrhea and sore throat.    Eyes:  Negative for photophobia.   Respiratory:  Negative for cough, chest tightness and shortness of breath.    Cardiovascular:  Negative for chest pain, palpitations and leg swelling.   Gastrointestinal:  Positive for diarrhea, nausea and vomiting. Negative for abdominal pain.   Genitourinary:  Negative for difficulty urinating, dysuria, flank pain,  "frequency, hematuria and urgency.   Musculoskeletal:  Negative for neck pain and neck stiffness.   Skin:  Negative for pallor and rash.   Neurological:  Negative for dizziness, syncope, weakness, numbness and headaches.   Hematological:  Negative for adenopathy. Does not bruise/bleed easily.   Psychiatric/Behavioral:  Positive for agitation and hallucinations. The patient is nervous/anxious.       Physical Exam:  /85   Pulse 62   Temp 99 øF (37.2 øC) (Oral)   Resp 16   Ht 160 cm (63\")   Wt 78.8 kg (173 lb 11.6 oz)   SpO2 97%   BMI 30.77 kg/mý         Physical Exam  Vitals and nursing note reviewed.   Constitutional:       General: She is not in acute distress.     Appearance: Normal appearance. She is not ill-appearing, toxic-appearing or diaphoretic.   HENT:      Head: Normocephalic and atraumatic.      Mouth/Throat:      Mouth: Mucous membranes are moist.   Eyes:      Pupils: Pupils are equal, round, and reactive to light.   Cardiovascular:      Rate and Rhythm: Normal rate and regular rhythm.      Pulses: Normal pulses.           Carotid pulses are 2+ on the right side and 2+ on the left side.       Radial pulses are 2+ on the right side and 2+ on the left side.        Femoral pulses are 2+ on the right side and 2+ on the left side.       Popliteal pulses are 2+ on the right side and 2+ on the left side.        Dorsalis pedis pulses are 2+ on the right side and 2+ on the left side.        Posterior tibial pulses are 2+ on the right side and 2+ on the left side.      Heart sounds: Normal heart sounds. No murmur heard.  Pulmonary:      Effort: Pulmonary effort is normal. No accessory muscle usage, respiratory distress or retractions.      Breath sounds: Normal breath sounds. No wheezing, rhonchi or rales.   Abdominal:      General: Abdomen is flat. There is no distension.      Palpations: Abdomen is soft. There is no mass or pulsatile mass.      Tenderness: There is abdominal tenderness in the " suprapubic area. There is no right CVA tenderness, left CVA tenderness, guarding or rebound. Negative signs include Bettencourt's sign and McBurney's sign.      Comments: No rigidity   Musculoskeletal:         General: No swelling, tenderness or deformity.      Cervical back: Neck supple. No tenderness.      Right lower leg: No edema.      Left lower leg: No edema.   Skin:     General: Skin is warm and dry.      Capillary Refill: Capillary refill takes less than 2 seconds.      Coloration: Skin is not jaundiced or pale.      Findings: No erythema.   Neurological:      General: No focal deficit present.      Mental Status: She is alert. Mental status is at baseline. She is confused.      Cranial Nerves: Cranial nerves 2-12 are intact.      Sensory: Sensation is intact. No sensory deficit.      Motor: Motor function is intact. No weakness.      Comments: A full and complete neurological exam cannot be completed due to the patient's baseline cognitive impairment   Psychiatric:         Attention and Perception: Attention normal.         Speech: Speech is delayed.         Behavior: Behavior is not agitated, aggressive or hyperactive. Behavior is cooperative.         Thought Content: Thought content is paranoid.         Cognition and Memory: Cognition is impaired.                    Procedures:  Procedures      Medical Decision Making:      Comorbidities that affect care:    High blood pressure, hypercholesterolemia, hypothyroid, mental disability, mood disorder, seizure, thyroid disorder    External Notes reviewed:    None      The following orders were placed and all results were independently analyzed by me:  Orders Placed This Encounter   Procedures    Blood Culture - Blood,    Blood Culture - Blood,    XR Chest 1 View    CT Abdomen Pelvis With Contrast    [place] Draw    Comprehensive Metabolic Panel    Single High Sensitivity Troponin T    Magnesium    Urinalysis With Microscopic If Indicated (No Culture) - Urine, Clean  Catch    CBC Auto Differential    Urinalysis, Microscopic Only - Urine, Clean Catch    Lactic Acid, Plasma    TSH    T4, Free    Valproic Acid Level, Total    Undress & Gown    Continuous Pulse Oximetry    Vital Signs    ECG 12 Lead ED Triage Standing Order; Weak / Dizzy / AMS    CBC & Differential    Green Top (Gel)    Lavender Top    Gold Top - SST    Light Blue Top       Medications Given in the Emergency Department:  Medications   cefTRIAXone (ROCEPHIN) IVPB 1,000 mg (0 mg Intravenous Stopped 8/6/23 1939)   ondansetron (ZOFRAN) injection 4 mg (4 mg Intravenous Given 8/6/23 1701)   sodium chloride 0.9 % bolus 1,000 mL (0 mL Intravenous Stopped 8/6/23 1939)   iopamidol (ISOVUE-370) 76 % injection 100 mL (100 mL Intravenous Given 8/6/23 1750)        ED Course:    The patient was initially evaluated in the triage area where orders were placed. The patient was later dispositioned by Rob Jones DO.      The patient was advised to stay for completion of workup which includes but is not limited to communication of labs and radiological results, reassessment and plan. The patient was advised that leaving prior to disposition by a provider could result in critical findings that are not communicated to the patient.     ED Course as of 08/08/23 0141   Sun Aug 06, 2023   1706 Patient Position: Sitting [SD]      ED Course User Index  [SD] Rob Jones DO       Labs:    Lab Results (last 24 hours)       ** No results found for the last 24 hours. **             Imaging:    No Radiology Exams Resulted Within Past 24 Hours      Differential Diagnosis and Discussion:      Psychiatric: Differential diagnosis includes but is not limited to depression, psychosis, bipolar disorder, anxiety, manic episode, schizophrenia, and substance abuse.    All labs were reviewed and interpreted by me.  CT scan radiology impression was interpreted by me.    MDM  Number of Diagnoses or Management Options  Hallucinations  Pyelonephritis  Urinary  tract infection without hematuria, site unspecified  Diagnosis management comments:   Patient's vital signs were stable while in the emergency room.  The patient was afebrile    Blood cultures were ordered but pending at the time of discharge    Urinalysis demonstrates too numerous white blood cells, 1+ bacteria, positive nitrate, indicative of UTI.  This was treated with Rocephin in the emergency room.    The patient's CMP was reviewed and shows no abnormalities of critical concern.  Of note, the patient's sodium and potassium are acceptable.  The patient's liver enzymes are unremarkable.  The patient's renal function including creatinine is preserved.  The patient has a normal anion gap.    The patient's CBC was reviewed and shows no abnormalities of critical concern.  Of note, there is no anemia requiring a blood transfusion and the platelet count is acceptable    The patient's magnesium level was normal    The patient's valproic acid was in the therapeutic range at 60.2    The patient's free T4 is 1.25 and the patient's TSH is 1.280.  I do not feel the patient is in thyrotoxicosis or thyroid storm or myxedema coma    Patient's chest x-ray demonstrates no active disease    CT scan of the abdomen pelvis demonstrates questionable cystitis and left pyelonephritis        19:22 EDT  At the time of discharge, the patient appeared very well, no distress and nontoxic.  There is no sign of sepsis.  The patient does have a UTI by labs.  The patient's CT scan is consistent with that suggesting possibly cystitis and possible early left-sided pyelonephritis.  The patient was treated with Rocephin.  The patient will be placed on Keflex.  The patient will follow-up with her primary care provider in 48 hours to review the urine culture results as well at sensitivity.  At the time of discharge, the patient does not appear to be hallucinating, agitated or paranoid.  The caregiver feels comfortable taking the patient home.  They  will also follow-up with her psychiatrist this week to determine whether further changes will need to be made in her psychiatric medication.  The caregivers given very specific instructions on when and why to return to the emergency room.  The caregiver voiced understanding she felt comfortable with this instructions and comfortable to take the patient home       Amount and/or Complexity of Data Reviewed  Clinical lab tests: reviewed  Tests in the radiology section of CPTr: reviewed  Tests in the medicine section of CPTr: reviewed           Social Determinants of Health:    Patient is independent, reliable, and has access to care.       Disposition and Care Coordination:    Discharged: The patient is suitable and stable for discharge with no need for consideration of observation or admission.    I have explained discharge medications and the need for follow up with the patient/caretakers. This was also printed in the discharge instructions. Patient was discharged with the following medications and follow up:      Medication List        New Prescriptions      cephalexin 500 MG capsule  Commonly known as: KEFLEX  Take 1 capsule by mouth 4 (Four) Times a Day for 7 days.            Changed      atorvastatin 20 MG tablet  Commonly known as: LIPITOR  TAKE ONE TABLET BY MOUTH EVERY NIGHT FOR CHOLESTEROL.  What changed:   how much to take  when to take this               Where to Get Your Medications        These medications were sent to [facility]/pharmacy #[id number] - [place], [place] - [address] - [phone]  - [phone] FX  [address] [place] [address]      Hours: 24-hours Phone: [phone]   cephalexin 500 MG capsule      Tonie Duran DO  [address] [place] [id number] [phone]    On 8/8/2023  UTI, review urine culture results, call for appointment       Final diagnoses:   Urinary tract infection without hematuria, site unspecified   Pyelonephritis   Hallucinations        ED  Disposition       ED Disposition   Discharge    Condition   Stable    Comment   --               This medical record created using voice recognition software.             Rob Jones DO  08/08/23 0141

## 2023-08-07 ENCOUNTER — TELEPHONE (OUTPATIENT)
Dept: FAMILY MEDICINE CLINIC | Facility: CLINIC | Age: 63
End: 2023-08-07
Payer: MEDICARE

## 2023-08-07 LAB — QT INTERVAL: 404 MS

## 2023-08-07 NOTE — TELEPHONE ENCOUNTER
Patient's caregiver called and is wanting to see if you can review her labs before her visit on Friday to see what you think .Please advise if she needs to repeat labs or different labs done.

## 2023-08-11 ENCOUNTER — OFFICE VISIT (OUTPATIENT)
Dept: FAMILY MEDICINE CLINIC | Facility: CLINIC | Age: 63
End: 2023-08-11
Payer: MEDICARE

## 2023-08-11 VITALS
SYSTOLIC BLOOD PRESSURE: 120 MMHG | OXYGEN SATURATION: 100 % | BODY MASS INDEX: 31.13 KG/M2 | WEIGHT: 175.7 LBS | TEMPERATURE: 97.9 F | RESPIRATION RATE: 16 BRPM | HEART RATE: 86 BPM | DIASTOLIC BLOOD PRESSURE: 66 MMHG | HEIGHT: 63 IN

## 2023-08-11 DIAGNOSIS — I10 ESSENTIAL HYPERTENSION: Chronic | ICD-10-CM

## 2023-08-11 DIAGNOSIS — E03.9 ACQUIRED HYPOTHYROIDISM: ICD-10-CM

## 2023-08-11 DIAGNOSIS — R41.82 ALTERED MENTAL STATUS, UNSPECIFIED ALTERED MENTAL STATUS TYPE: Primary | ICD-10-CM

## 2023-08-11 DIAGNOSIS — E78.00 HYPERCHOLESTEREMIA: ICD-10-CM

## 2023-08-11 DIAGNOSIS — E66.09 CLASS 1 OBESITY DUE TO EXCESS CALORIES WITH SERIOUS COMORBIDITY AND BODY MASS INDEX (BMI) OF 31.0 TO 31.9 IN ADULT: Chronic | ICD-10-CM

## 2023-08-11 PROBLEM — Z00.00 MEDICARE ANNUAL WELLNESS VISIT, SUBSEQUENT: Status: RESOLVED | Noted: 2021-09-27 | Resolved: 2023-08-11

## 2023-08-11 PROBLEM — E66.811 CLASS 1 OBESITY DUE TO EXCESS CALORIES WITH SERIOUS COMORBIDITY AND BODY MASS INDEX (BMI) OF 31.0 TO 31.9 IN ADULT: Chronic | Status: RESOLVED | Noted: 2021-09-27 | Resolved: 2023-08-11

## 2023-08-11 LAB
BACTERIA SPEC AEROBE CULT: NORMAL
BACTERIA SPEC AEROBE CULT: NORMAL

## 2023-08-11 PROCEDURE — 3074F SYST BP LT 130 MM HG: CPT | Performed by: FAMILY MEDICINE

## 2023-08-11 PROCEDURE — 3078F DIAST BP <80 MM HG: CPT | Performed by: FAMILY MEDICINE

## 2023-08-11 PROCEDURE — 99214 OFFICE O/P EST MOD 30 MIN: CPT | Performed by: FAMILY MEDICINE

## 2023-08-11 RX ORDER — ZIPRASIDONE HYDROCHLORIDE 60 MG/1
60 CAPSULE ORAL 2 TIMES DAILY WITH MEALS
COMMUNITY

## 2023-08-11 RX ORDER — CALCIUM CARBONATE/VITAMIN D3 500MG-5MCG
1 TABLET ORAL DAILY
Qty: 30 TABLET | Refills: 5 | Status: SHIPPED | OUTPATIENT
Start: 2023-08-11

## 2023-08-11 RX ORDER — CALCIUM CARBONATE/VITAMIN D3 500MG-5MCG
1 TABLET ORAL DAILY
Qty: 30 TABLET | Refills: 5 | Status: SHIPPED | OUTPATIENT
Start: 2023-08-11 | End: 2023-08-11 | Stop reason: SDUPTHER

## 2023-08-11 NOTE — TELEPHONE ENCOUNTER
Caller: NEETA HAND    Relationship: Emergency Contact    Best call back number: [phone]     What is the best time to reach you: ANYTIME     Who are you requesting to speak with (clinical staff, provider,  specific staff member): CLINICAL         What was the call regarding: CALLING ON BEHALF OF MEDICATION, STATING THAT PHARMACY HAS NOT RECEIVED THE Calcium Carb-Cholecalciferol (OYSCO 500 + D) 500-5 MG-MCG tablet per tablet

## 2023-08-11 NOTE — PROGRESS NOTES
"Chief Complaint  Er follow up  (UTI ), No Bm since 08/02/23, and Hallucinations    Subjective        Gela Michaud presents to [facility]  History of Present Illness  She presents today for a follow-up for the management of her chronic medical conditions and an ER follow-up. She is accompanied by Yas her caregiver. The patient has MMR. She also has a past medical history significant for anemia, anxiety, hyperlipidemia, hyperthyroidism, mood disorder and history of seizures.    She has been with her current caregiver for 8 years.     Her symptoms started a month ago with a UTI and nausea. She is fearful and sometime combative. She is hallucinating in the room today.  She has a Aburto catheter in place.     The patient has no other complaints today and denies chest pain, shortness of breath, weakness, numbness, nausea, vomiting, diarrhea, dizziness or syncopal event.            Objective   Vital Signs:  /66   Pulse 86   Temp 97.9 øF (36.6 øC)   Resp 16   Ht 160 cm (62.99\")   Wt 79.7 kg (175 lb 11.2 oz)   SpO2 100%   BMI 31.13 kg/mý   Estimated body mass index is 31.13 kg/mý as calculated from the following:    Height as of this encounter: 160 cm (62.99\").    Weight as of this encounter: 79.7 kg (175 lb 11.2 oz).               Physical Exam  Vitals reviewed.   Constitutional:       Appearance: Normal appearance. She is well-developed.   HENT:      Head: Normocephalic and atraumatic.      Right Ear: External ear normal.      Left Ear: External ear normal.      Mouth/Throat:      Pharynx: No oropharyngeal exudate.   Eyes:      Conjunctiva/sclera: Conjunctivae normal.      Pupils: Pupils are equal, round, and reactive to light.   Neck:      Vascular: No carotid bruit.   Cardiovascular:      Rate and Rhythm: Normal rate and regular rhythm.      Heart sounds: No murmur heard.    No friction rub. No gallop.   Pulmonary:      Effort: Pulmonary effort is normal.      Breath " sounds: Normal breath sounds. No wheezing or rhonchi.   Abdominal:      General: There is no distension.   Skin:     General: Skin is warm and dry.   Neurological:      Mental Status: She is alert and oriented to person, place, and time.      Cranial Nerves: No cranial nerve deficit.      Motor: No weakness.   Psychiatric:         Mood and Affect: Mood and affect normal.         Behavior: Behavior normal.         Thought Content: Thought content normal.         Judgment: Judgment normal.      Result Review :    CMP          7/3/2023    18:43 8/1/2023    22:36 8/6/2023    14:04   CMP   Glucose 106  119  136    BUN 19  17  7    Creatinine 1.43  1.14  1.09    EGFR 41.3  54.2  57.2    Sodium 132  138  143    Potassium 4.2  4.5  3.9    Chloride 98  103  106    Calcium 9.0  9.3  8.9    Total Protein 7.2  7.1  6.3    Albumin 4.2  4.1  3.6    Globulin 3.0  3.0  2.7    Total Bilirubin 0.2  0.2  0.2    Alkaline Phosphatase 107  95  84    AST (SGOT) 60  46  29    ALT (SGPT) 20  16  9    Albumin/Globulin Ratio 1.4  1.4  1.3    BUN/Creatinine Ratio 13.3  14.9  6.4    Anion Gap 12.6  11.5  12.3      CBC          7/3/2023    18:43 8/1/2023    22:36 8/6/2023    14:04   CBC   WBC 5.28  6.61  8.75    RBC 4.13  4.02  3.76    Hemoglobin 12.8  12.4  11.8    Hematocrit 38.3  38.4  35.8    MCV 92.7  95.5  95.2    MCH 31.0  30.8  31.4    MCHC 33.4  32.3  33.0    RDW 12.7  13.2  13.3    Platelets 187  195  176        TSH          8/6/2023    14:04   TSH   TSH 1.280                   Assessment and Plan   Diagnoses and all orders for this visit:    1. Altered mental status, unspecified altered mental status type (Primary)  Assessment & Plan:  Urine clear in franks bag. Lungs clear. No sign of skin infection. I believe her symptoms are psych in nature. The caregiver was told to follow-up with psychiatry.      2. Essential hypertension  Assessment & Plan:  Hypertension is improving with treatment.  Continue current treatment regimen.  Dietary  sodium restriction.  Weight loss.  Blood pressure will be reassessed at the next regular appointment.    Orders:  -     Discontinue: Calcium Carb-Cholecalciferol (OYSCO 500 + D) 500-5 MG-MCG tablet per tablet; Take 1 tablet by mouth Daily.  Dispense: 30 tablet; Refill: 5    3. Acquired hypothyroidism  Assessment & Plan:  The patient's hypothyroidism is currently well controlled with 50 mcg of Synthroid daily.  We will continue monitoring her thyroid levels and manage according to findings.    Orders:  -     Discontinue: Calcium Carb-Cholecalciferol (OYSCO 500 + D) 500-5 MG-MCG tablet per tablet; Take 1 tablet by mouth Daily.  Dispense: 30 tablet; Refill: 5    4. Class 1 obesity due to excess calories with serious comorbidity and body mass index (BMI) of 31.0 to 31.9 in adult  Assessment & Plan:  Patient's (Body mass index is 31.13 kg/mý.) indicates that they are obese (BMI >30) with health conditions that include hypertension and dyslipidemias . Weight is improving with lifestyle modifications. BMI  is above average; BMI management plan is completed. We discussed low calorie, low carb based diet program, portion control, and increasing exercise.                Follow Up   Return in about 4 months (around 12/11/2023).  Patient was given instructions and counseling regarding her condition or for health maintenance advice. Please see specific information pulled into the AVS if appropriate.

## 2023-08-11 NOTE — ASSESSMENT & PLAN NOTE
The patient's hypothyroidism is currently well controlled with 50 mcg of Synthroid daily.  We will continue monitoring her thyroid levels and manage according to findings.

## 2023-08-11 NOTE — ASSESSMENT & PLAN NOTE
Patient's (Body mass index is 31.13 kg/mý.) indicates that they are obese (BMI >30) with health conditions that include hypertension and dyslipidemias . Weight is improving with lifestyle modifications. BMI  is above average; BMI management plan is completed. We discussed low calorie, low carb based diet program, portion control, and increasing exercise.

## 2023-08-12 PROBLEM — N40.1 BENIGN PROSTATIC HYPERPLASIA WITH URINARY OBSTRUCTION: Status: ACTIVE | Noted: 2023-08-12

## 2023-08-12 PROBLEM — R33.9 URINARY RETENTION: Status: ACTIVE | Noted: 2023-08-12

## 2023-08-12 PROBLEM — N13.8 BENIGN PROSTATIC HYPERPLASIA WITH URINARY OBSTRUCTION: Status: ACTIVE | Noted: 2023-08-12

## 2023-08-12 NOTE — PROGRESS NOTES
Chief Complaint: Urologic complaint    Subjective         History of Present Illness  Gela Michaud is a 63 y.o. female       Urinary retention  Intellectual disability    24-hour adult foster care    8/23 urinary retention  Patient did not void for 24 hours about 2 weeks ago went to the ED and had a catheter placed -not sure exactly how much they got out.    No previous episodes of retention    Patient has been straining to void for many years.    past medical history significant for anemia, anxiety, hyperlipidemia, hyperthyroidism, mood disorder and history of seizures.    8/6/2023 CT abdomen/pelvis with - questionable cystitis and left pyelonephritis.   8/6/2023 UA-6-12 RBCs , nitrite positive, 1+ bacteria, 1.0, GFR 57    Patient has had some trouble with recurrent urinary tract infection    No GH     No history of kidney  stone.     family history unknown     no history of urologic surgery.  History of a hysterectomy    No cardiopulmonary history   Non-smoker.  No anticoagulation            Objective     Past Medical History:   Diagnosis Date    Abdominal bloating     Acid reflux     Anemia     Anxiety     Condition not found     Mental Retardation    Foot pain, bilateral     High blood pressure     High cholesterol     Hyperlipemia     Hypertension     Hypothyroidism     Ingrowing toenail     Mood disorder     Neurologic disorder     Seizure     Thyroid disorder     Thyroid trouble     Tinea unguium        Past Surgical History:   Procedure Laterality Date    ABDOMINAL HYSTERECTOMY      COLONOSCOPY  2018    ENDOSCOPY  2018         Current Outpatient Medications:     atorvastatin (LIPITOR) 20 MG tablet, TAKE ONE TABLET BY MOUTH EVERY NIGHT FOR CHOLESTEROL. (Patient taking differently: Take 2 tablets by mouth Daily.), Disp: 90 tablet, Rfl: 3    benztropine (COGENTIN) 2 MG tablet, Take 1 tablet by mouth 2 (Two) Times a Day., Disp: , Rfl:     Calcium Carb-Cholecalciferol (OYSCO 500 + D) 500-5 MG-MCG tablet per  tablet, Take 1 tablet by mouth Daily., Disp: 30 tablet, Rfl: 5    cephalexin (KEFLEX) 500 MG capsule, Take 1 capsule by mouth 4 (Four) Times a Day for 7 days., Disp: 28 capsule, Rfl: 0    cholecalciferol (Vitamin D) 25 MCG (1000 UT) tablet, Take 1 tablet by mouth Daily., Disp: 90 tablet, Rfl: 5    divalproex (DEPAKOTE) 250 MG DR tablet, Take 1 tablet by mouth 2 (Two) Times a Day., Disp: , Rfl:     docusate sodium (COLACE) 100 MG capsule, Take 1 capsule by mouth 2 (Two) Times a Day., Disp: 180 capsule, Rfl: 3    dorzolamide (TRUSOPT) 2 % ophthalmic solution, 1 drop 2 (Two) Times a Day., Disp: , Rfl:     ferrous sulfate 324 MG tablet delayed-release, Take 1 tablet by mouth Daily With Breakfast., Disp: 30 tablet, Rfl: 1    folic acid (FOLVITE) 1 MG tablet, Take 1 tablet by mouth Daily., Disp: 90 tablet, Rfl: 5    haloperidol (HALDOL) 10 MG tablet, Take 1 tablet by mouth 2 (Two) Times a Day., Disp: , Rfl:     levothyroxine (SYNTHROID, LEVOTHROID) 50 MCG tablet, Take 1 tablet by mouth Daily. Take on empty stomach daily at 07:00, Disp: 90 tablet, Rfl: 5    lisinopril (PRINIVIL,ZESTRIL) 5 MG tablet, TAKE ONE TABLET BY MOUTH ONCE DAILY (Patient taking differently: Take 1 tablet by mouth Daily.), Disp: 90 tablet, Rfl: 3    polyethylene glycol (MIRALAX) 17 g packet, Take 17 g by mouth Daily., Disp: 90 each, Rfl: 1    senna (HM Senna) 8.6 MG tablet, Take 1 tablet by mouth 2 (Two) Times a Day., Disp: 180 tablet, Rfl: 5    Vitamin A 3 MG (05894 UT) capsule, Take 1 capsule by mouth Daily., Disp: , Rfl:     vitamin B-12 (CYANOCOBALAMIN) 1000 MCG tablet, Take 1 tablet by mouth Daily., Disp: 90 tablet, Rfl: 3    ziprasidone (Geodon) 60 MG capsule, Take 1 capsule by mouth 2 (Two) Times a Day With Meals., Disp: , Rfl:     Allergies   Allergen Reactions    Lorazepam Unknown - High Severity        Family History   Problem Relation Age of Onset    No Known Problems Mother     No Known Problems Father        Social History      Socioeconomic History    Marital status: Single   Tobacco Use    Smoking status: Never    Smokeless tobacco: Never    Tobacco comments:     second hand smoke exposure-never   Vaping Use    Vaping Use: Never used   Substance and Sexual Activity    Alcohol use: Never    Drug use: Never       Vital Signs:   There were no vitals taken for this visit.     Physical exam    Alert and orient x3  Well appearing, well developed, in no acute distress   Unlabored respirations  Nontender/nondistended      Grossly oriented to person, place and time, judgment is intact, normal mood and affect         Assessment and Plan          Urinary retention      Records reviewed today and summarized in the chart    At time she went into retention patient was extremely confused and having mental issues.  She cannot do CIC.      Start Flomax 0.4 mg daily.  Risk benefits discussed    We may have to do urodynamics at this time we will just give her a voiding trial after talking to her caregiver.      We will have her take her catheter out at 10 PM this Thursday evening and have her come in on Friday after lunch to see if she is voiding and if not how much she is retaining.    Caregiver and patient understand if she has fever greater than 101, intractable nausea/vomiting tractable pain she should go to emergency room    We may need UDS depending on how she does over the next few days.    F/u on Friday.

## 2023-08-15 ENCOUNTER — OFFICE VISIT (OUTPATIENT)
Dept: UROLOGY | Facility: CLINIC | Age: 63
End: 2023-08-15
Payer: MEDICARE

## 2023-08-15 VITALS — WEIGHT: 175.93 LBS | BODY MASS INDEX: 31.17 KG/M2 | HEIGHT: 63 IN

## 2023-08-15 DIAGNOSIS — R33.9 URINARY RETENTION: Primary | ICD-10-CM

## 2023-08-15 RX ORDER — TAMSULOSIN HYDROCHLORIDE 0.4 MG/1
1 CAPSULE ORAL DAILY
Qty: 90 CAPSULE | Refills: 4 | Status: SHIPPED | OUTPATIENT
Start: 2023-08-15

## 2023-08-16 NOTE — PROGRESS NOTES
Chief Complaint: Urologic complaint    Subjective         History of Present Illness  Gela Michaud is a 63 y.o. female       Urinary retention  Intellectual disability    24-hour adult foster care      Started Flomax a few days ago.  0.4 mg daily.  No side effects    Catheter came out last night at 10  PM.  Has voided a few times, no straining.      No GH      Previous    Could not do CIC      8/23 urinary retention  Patient did not void for 24 hours about 2 weeks ago went to the ED and had a catheter placed -not sure exactly how much they got out.    No previous episodes of retention    Patient has been straining to void for many years.    past medical history significant for anemia, anxiety, hyperlipidemia, hyperthyroidism, mood disorder and history of seizures.    8/6/2023 CT abdomen/pelvis with - questionable cystitis and left pyelonephritis.   8/6/2023 UA-6-12 RBCs , nitrite positive, 1+ bacteria, 1.0, GFR 57    Patient has had some trouble with recurrent urinary tract infection    PVR    8/23   000      No GH     No history of kidney  stone.     family history unknown     no history of urologic surgery.  History of a hysterectomy    No cardiopulmonary history   Non-smoker.  No anticoagulation        Bladder Scan interpretation 08/18/2023    Estimation of residual urine via [email or web address] 3000 Verathon Bladder Scan  MA/nurse performing: MYA Vaughn  Residual Urine: 000 ml  Indication: Urinary retention   Position: Supine  Examination: Incremental scanning of the suprapubic area using 2.0 MHz transducer using copious amounts of acoustic gel.   Findings: An anechoic area was demonstrated which represented the bladder, with measurement of residual urine as noted. I inspected this myself. In that the residual urine was stable or insignificant, refer to plan for treatment and plan necessary at this time.     Results for orders placed or performed in visit on 08/18/23   Bladder Scan   Result Value Ref Range    Volume 0    POC  Urinalysis Dipstick, Automated    Specimen: Urine   Result Value Ref Range    Color Yellow Yellow, Straw, Dark Yellow, Vivian    Clarity, UA Clear Clear    Specific Gravity  1.020 1.005 - 1.030    pH, Urine 7.0 5.0 - 8.0    Leukocytes Small (1+) (A) Negative    Nitrite, UA Negative Negative    Protein, POC Negative Negative mg/dL    Glucose, UA Negative Negative mg/dL    Ketones, UA Trace (A) Negative    Urobilinogen, UA Normal Normal, 0.2 E.U./dL    Bilirubin Negative Negative    Blood, UA Negative Negative    Lot Number [id number]     Expiration Date 7/2,024             Objective     Past Medical History:   Diagnosis Date    Abdominal bloating     Acid reflux     Anemia     Anxiety     Condition not found     Mental Retardation    Foot pain, bilateral     High blood pressure     High cholesterol     Hyperlipemia     Hypertension     Hypothyroidism     Ingrowing toenail     Mood disorder     Neurologic disorder     Seizure     Thyroid disorder     Thyroid trouble     Tinea unguium        Past Surgical History:   Procedure Laterality Date    ABDOMINAL HYSTERECTOMY      COLONOSCOPY  2018    ENDOSCOPY  2018         Current Outpatient Medications:     atorvastatin (LIPITOR) 20 MG tablet, TAKE ONE TABLET BY MOUTH EVERY NIGHT FOR CHOLESTEROL. (Patient taking differently: Take 2 tablets by mouth Daily.), Disp: 90 tablet, Rfl: 3    benztropine (COGENTIN) 2 MG tablet, Take 1 tablet by mouth 2 (Two) Times a Day., Disp: , Rfl:     Calcium Carb-Cholecalciferol (OYSCO 500 + D) 500-5 MG-MCG tablet per tablet, Take 1 tablet by mouth Daily., Disp: 30 tablet, Rfl: 5    cholecalciferol (Vitamin D) 25 MCG (1000 UT) tablet, Take 1 tablet by mouth Daily., Disp: 90 tablet, Rfl: 5    divalproex (DEPAKOTE) 250 MG DR tablet, Take 1 tablet by mouth 2 (Two) Times a Day., Disp: , Rfl:     docusate sodium (COLACE) 100 MG capsule, Take 1 capsule by mouth 2 (Two) Times a Day., Disp: 180 capsule, Rfl: 3    dorzolamide (TRUSOPT) 2 % ophthalmic  solution, 1 drop 2 (Two) Times a Day., Disp: , Rfl:     ferrous sulfate 324 MG tablet delayed-release, Take 1 tablet by mouth Daily With Breakfast., Disp: 30 tablet, Rfl: 1    folic acid (FOLVITE) 1 MG tablet, Take 1 tablet by mouth Daily., Disp: 90 tablet, Rfl: 5    haloperidol (HALDOL) 10 MG tablet, Take 1 tablet by mouth 2 (Two) Times a Day., Disp: , Rfl:     levothyroxine (SYNTHROID, LEVOTHROID) 50 MCG tablet, Take 1 tablet by mouth Daily. Take on empty stomach daily at 07:00, Disp: 90 tablet, Rfl: 5    lisinopril (PRINIVIL,ZESTRIL) 5 MG tablet, TAKE ONE TABLET BY MOUTH ONCE DAILY (Patient taking differently: Take 1 tablet by mouth Daily.), Disp: 90 tablet, Rfl: 3    polyethylene glycol (MIRALAX) 17 g packet, Take 17 g by mouth Daily., Disp: 90 each, Rfl: 1    senna (HM Senna) 8.6 MG tablet, Take 1 tablet by mouth 2 (Two) Times a Day., Disp: 180 tablet, Rfl: 5    tamsulosin (FLOMAX) 0.4 MG capsule 24 hr capsule, Take 1 capsule by mouth Daily., Disp: 90 capsule, Rfl: 4    Vitamin A 3 MG (47793 UT) capsule, Take 1 capsule by mouth Daily., Disp: , Rfl:     vitamin B-12 (CYANOCOBALAMIN) 1000 MCG tablet, Take 1 tablet by mouth Daily., Disp: 90 tablet, Rfl: 3    ziprasidone (Geodon) 60 MG capsule, Take 1 capsule by mouth 2 (Two) Times a Day With Meals., Disp: , Rfl:     Allergies   Allergen Reactions    Lorazepam Unknown - High Severity        Family History   Problem Relation Age of Onset    No Known Problems Mother     No Known Problems Father        Social History     Socioeconomic History    Marital status: Single   Tobacco Use    Smoking status: Never     Passive exposure: Current    Smokeless tobacco: Never    Tobacco comments:     second hand smoke exposure-never   Vaping Use    Vaping Use: Never used   Substance and Sexual Activity    Alcohol use: Never    Drug use: Never       Vital Signs:   There were no vitals taken for this visit.            Assessment and Plan            Urinary retention        Continue  Flomax 0.4 mg daily. Cont  Flomax -hopefully help keep her voiding without issue in the future      Follow-up with NP in 6 months      PVR follow-up

## 2023-08-18 ENCOUNTER — OFFICE VISIT (OUTPATIENT)
Dept: UROLOGY | Facility: CLINIC | Age: 63
End: 2023-08-18
Payer: MEDICARE

## 2023-08-18 VITALS — BODY MASS INDEX: 32.2 KG/M2 | HEIGHT: 62 IN | WEIGHT: 175 LBS | RESPIRATION RATE: 16 BRPM

## 2023-08-18 DIAGNOSIS — R33.9 URINARY RETENTION: Primary | ICD-10-CM

## 2023-08-18 LAB
BILIRUB BLD-MCNC: NEGATIVE MG/DL
CLARITY, POC: CLEAR
COLOR UR: YELLOW
EXPIRATION DATE: ABNORMAL
GLUCOSE UR STRIP-MCNC: NEGATIVE MG/DL
KETONES UR QL: ABNORMAL
LEUKOCYTE EST, POC: ABNORMAL
Lab: ABNORMAL
NITRITE UR-MCNC: NEGATIVE MG/ML
PH UR: 7 [PH] (ref 5–8)
PROT UR STRIP-MCNC: NEGATIVE MG/DL
RBC # UR STRIP: NEGATIVE /UL
SP GR UR: 1.02 (ref 1–1.03)
SPECIMEN VOL 24H UR: 0 L
UROBILINOGEN UR QL: NORMAL

## 2023-08-28 ENCOUNTER — TRANSCRIBE ORDERS (OUTPATIENT)
Dept: ADMINISTRATIVE | Facility: HOSPITAL | Age: 63
End: 2023-08-28
Payer: MEDICARE

## 2023-08-28 DIAGNOSIS — R26.81 GAIT INSTABILITY: ICD-10-CM

## 2023-08-28 DIAGNOSIS — R51.9 NONINTRACTABLE HEADACHE, UNSPECIFIED CHRONICITY PATTERN, UNSPECIFIED HEADACHE TYPE: ICD-10-CM

## 2023-08-28 DIAGNOSIS — R41.82 MENTAL STATUS, DECREASED: ICD-10-CM

## 2023-08-28 DIAGNOSIS — R25.1 TREMOR: Primary | ICD-10-CM

## 2023-08-29 ENCOUNTER — HOSPITAL ENCOUNTER (OUTPATIENT)
Dept: MRI IMAGING | Facility: HOSPITAL | Age: 63
Discharge: HOME | End: 2023-08-29
Admitting: NURSE PRACTITIONER
Payer: MEDICARE

## 2023-08-29 ENCOUNTER — HOSPITAL ENCOUNTER (OUTPATIENT)
Dept: CT IMAGING | Facility: HOSPITAL | Age: 63
Discharge: HOME | End: 2023-08-29
Payer: MEDICARE

## 2023-08-29 DIAGNOSIS — R41.82 MENTAL STATUS, DECREASED: ICD-10-CM

## 2023-08-29 DIAGNOSIS — R25.1 TREMOR: ICD-10-CM

## 2023-08-29 DIAGNOSIS — R51.9 NONINTRACTABLE HEADACHE, UNSPECIFIED CHRONICITY PATTERN, UNSPECIFIED HEADACHE TYPE: ICD-10-CM

## 2023-08-29 DIAGNOSIS — R26.81 GAIT INSTABILITY: ICD-10-CM

## 2023-08-29 PROCEDURE — 70450 CT HEAD/BRAIN W/O DYE: CPT

## 2023-08-31 RX ORDER — ATORVASTATIN CALCIUM 20 MG/1
TABLET, FILM COATED ORAL
Qty: 90 TABLET | Refills: 3 | Status: SHIPPED | OUTPATIENT
Start: 2023-08-31

## 2023-08-31 RX ORDER — LISINOPRIL 5 MG/1
TABLET ORAL
Qty: 90 TABLET | Refills: 3 | Status: SHIPPED | OUTPATIENT
Start: 2023-08-31

## 2023-09-21 RX ORDER — SULFAMETHOXAZOLE AND TRIMETHOPRIM 800; 160 MG/1; MG/1
1 TABLET ORAL 2 TIMES DAILY
Qty: 14 TABLET | Refills: 0 | Status: SHIPPED | OUTPATIENT
Start: 2023-09-21

## 2023-09-21 RX ORDER — PHENAZOPYRIDINE HYDROCHLORIDE 200 MG/1
200 TABLET, FILM COATED ORAL 3 TIMES DAILY PRN
Qty: 6 TABLET | Refills: 0 | Status: SHIPPED | OUTPATIENT
Start: 2023-09-21

## 2023-10-16 ENCOUNTER — HOSPITAL ENCOUNTER (EMERGENCY)
Facility: HOSPITAL | Age: 63
Discharge: HOME | End: 2023-10-16
Attending: EMERGENCY MEDICINE | Admitting: EMERGENCY MEDICINE
Payer: MEDICARE

## 2023-10-16 ENCOUNTER — APPOINTMENT (OUTPATIENT)
Dept: CT IMAGING | Facility: HOSPITAL | Age: 63
End: 2023-10-16
Payer: MEDICARE

## 2023-10-16 VITALS
TEMPERATURE: 97.6 F | BODY MASS INDEX: 26.67 KG/M2 | OXYGEN SATURATION: 97 % | SYSTOLIC BLOOD PRESSURE: 138 MMHG | DIASTOLIC BLOOD PRESSURE: 72 MMHG | WEIGHT: 160.05 LBS | HEART RATE: 71 BPM | RESPIRATION RATE: 18 BRPM | HEIGHT: 65 IN

## 2023-10-16 DIAGNOSIS — F99 PSYCHIATRIC DISTURBANCE: ICD-10-CM

## 2023-10-16 DIAGNOSIS — F91.9 BEHAVIOR DISTURBANCE: Primary | ICD-10-CM

## 2023-10-16 LAB
ALBUMIN SERPL-MCNC: 4 G/DL (ref 3.5–5.2)
ALBUMIN/GLOB SERPL: 1.5 G/DL
ALP SERPL-CCNC: 105 U/L (ref 39–117)
ALT SERPL W P-5'-P-CCNC: 15 U/L (ref 1–33)
AMPHET+METHAMPHET UR QL: NEGATIVE
ANION GAP SERPL CALCULATED.3IONS-SCNC: 9.9 MMOL/L (ref 5–15)
APAP SERPL-MCNC: <5 MCG/ML (ref 0–30)
AST SERPL-CCNC: 38 U/L (ref 1–32)
BACTERIA UR QL AUTO: ABNORMAL /HPF
BARBITURATES UR QL SCN: NEGATIVE
BASOPHILS # BLD AUTO: 0.03 10*3/MM3 (ref 0–0.2)
BASOPHILS NFR BLD AUTO: 0.8 % (ref 0–1.5)
BENZODIAZ UR QL SCN: NEGATIVE
BILIRUB SERPL-MCNC: 0.2 MG/DL (ref 0–1.2)
BILIRUB UR QL STRIP: NEGATIVE
BUN SERPL-MCNC: 7 MG/DL (ref 8–23)
BUN/CREAT SERPL: 6.4 (ref 7–25)
CALCIUM SPEC-SCNC: 9.3 MG/DL (ref 8.6–10.5)
CANNABINOIDS SERPL QL: NEGATIVE
CHLORIDE SERPL-SCNC: 96 MMOL/L (ref 98–107)
CLARITY UR: ABNORMAL
CO2 SERPL-SCNC: 27.1 MMOL/L (ref 22–29)
COCAINE UR QL: NEGATIVE
COLOR UR: YELLOW
CREAT SERPL-MCNC: 1.1 MG/DL (ref 0.57–1)
DEPRECATED RDW RBC AUTO: 45.4 FL (ref 37–54)
EGFRCR SERPLBLD CKD-EPI 2021: 56.6 ML/MIN/1.73
EOSINOPHIL # BLD AUTO: 0.09 10*3/MM3 (ref 0–0.4)
EOSINOPHIL NFR BLD AUTO: 2.4 % (ref 0.3–6.2)
ERYTHROCYTE [DISTWIDTH] IN BLOOD BY AUTOMATED COUNT: 13.1 % (ref 12.3–15.4)
ETHANOL BLD-MCNC: <10 MG/DL (ref 0–10)
ETHANOL UR QL: <0.01 %
FENTANYL UR-MCNC: NEGATIVE NG/ML
GLOBULIN UR ELPH-MCNC: 2.7 GM/DL
GLUCOSE SERPL-MCNC: 105 MG/DL (ref 65–99)
GLUCOSE UR STRIP-MCNC: NEGATIVE MG/DL
HCT VFR BLD AUTO: 35 % (ref 34–46.6)
HGB BLD-MCNC: 11.2 G/DL (ref 12–15.9)
HGB UR QL STRIP.AUTO: ABNORMAL
HOLD SPECIMEN: NORMAL
HOLD SPECIMEN: NORMAL
HYALINE CASTS UR QL AUTO: ABNORMAL /LPF
IMM GRANULOCYTES # BLD AUTO: 0.01 10*3/MM3 (ref 0–0.05)
IMM GRANULOCYTES NFR BLD AUTO: 0.3 % (ref 0–0.5)
KETONES UR QL STRIP: ABNORMAL
LEUKOCYTE ESTERASE UR QL STRIP.AUTO: ABNORMAL
LYMPHOCYTES # BLD AUTO: 1.3 10*3/MM3 (ref 0.7–3.1)
LYMPHOCYTES NFR BLD AUTO: 34.9 % (ref 19.6–45.3)
MCH RBC QN AUTO: 30.4 PG (ref 26.6–33)
MCHC RBC AUTO-ENTMCNC: 32 G/DL (ref 31.5–35.7)
MCV RBC AUTO: 94.9 FL (ref 79–97)
METHADONE UR QL SCN: NEGATIVE
MONOCYTES # BLD AUTO: 0.48 10*3/MM3 (ref 0.1–0.9)
MONOCYTES NFR BLD AUTO: 12.9 % (ref 5–12)
NEUTROPHILS NFR BLD AUTO: 1.82 10*3/MM3 (ref 1.7–7)
NEUTROPHILS NFR BLD AUTO: 48.7 % (ref 42.7–76)
NITRITE UR QL STRIP: NEGATIVE
NRBC BLD AUTO-RTO: 0 /100 WBC (ref 0–0.2)
OPIATES UR QL: NEGATIVE
OXYCODONE UR QL SCN: NEGATIVE
PH UR STRIP.AUTO: 6 [PH] (ref 5–8)
PLATELET # BLD AUTO: 199 10*3/MM3 (ref 140–450)
PMV BLD AUTO: 8.9 FL (ref 6–12)
POTASSIUM SERPL-SCNC: 4.4 MMOL/L (ref 3.5–5.2)
PROT SERPL-MCNC: 6.7 G/DL (ref 6–8.5)
PROT UR QL STRIP: NEGATIVE
RBC # BLD AUTO: 3.69 10*6/MM3 (ref 3.77–5.28)
RBC # UR STRIP: ABNORMAL /HPF
REF LAB TEST METHOD: ABNORMAL
SALICYLATES SERPL-MCNC: <0.3 MG/DL
SODIUM SERPL-SCNC: 133 MMOL/L (ref 136–145)
SP GR UR STRIP: 1.01 (ref 1–1.03)
SQUAMOUS #/AREA URNS HPF: ABNORMAL /HPF
UROBILINOGEN UR QL STRIP: ABNORMAL
WBC # UR STRIP: ABNORMAL /HPF
WBC NRBC COR # BLD: 3.73 10*3/MM3 (ref 3.4–10.8)
WHOLE BLOOD HOLD COAG: NORMAL
WHOLE BLOOD HOLD SPECIMEN: NORMAL

## 2023-10-16 PROCEDURE — 80143 DRUG ASSAY ACETAMINOPHEN: CPT

## 2023-10-16 PROCEDURE — 99283 EMERGENCY DEPT VISIT LOW MDM: CPT

## 2023-10-16 PROCEDURE — 25010000002 HALOPERIDOL LACTATE PER 5 MG: Performed by: EMERGENCY MEDICINE

## 2023-10-16 PROCEDURE — 80179 DRUG ASSAY SALICYLATE: CPT

## 2023-10-16 PROCEDURE — 99285 EMERGENCY DEPT VISIT HI MDM: CPT

## 2023-10-16 PROCEDURE — 80307 DRUG TEST PRSMV CHEM ANLYZR: CPT | Performed by: EMERGENCY MEDICINE

## 2023-10-16 PROCEDURE — 85025 COMPLETE CBC W/AUTO DIFF WBC: CPT

## 2023-10-16 PROCEDURE — 96372 THER/PROPH/DIAG INJ SC/IM: CPT

## 2023-10-16 PROCEDURE — 25010000002 DIPHENHYDRAMINE PER 50 MG: Performed by: EMERGENCY MEDICINE

## 2023-10-16 PROCEDURE — 80053 COMPREHEN METABOLIC PANEL: CPT

## 2023-10-16 PROCEDURE — 81001 URINALYSIS AUTO W/SCOPE: CPT | Performed by: EMERGENCY MEDICINE

## 2023-10-16 PROCEDURE — 36415 COLL VENOUS BLD VENIPUNCTURE: CPT

## 2023-10-16 PROCEDURE — 82077 ASSAY SPEC XCP UR&BREATH IA: CPT

## 2023-10-16 RX ORDER — CYCLOSPORINE 0.5 MG/ML
1 EMULSION OPHTHALMIC 2 TIMES DAILY
COMMUNITY

## 2023-10-16 RX ORDER — DIPHENHYDRAMINE HYDROCHLORIDE 50 MG/ML
25 INJECTION INTRAMUSCULAR; INTRAVENOUS ONCE
Status: COMPLETED | OUTPATIENT
Start: 2023-10-16 | End: 2023-10-16

## 2023-10-16 RX ORDER — HALOPERIDOL 5 MG/ML
5 INJECTION INTRAMUSCULAR ONCE
Status: COMPLETED | OUTPATIENT
Start: 2023-10-16 | End: 2023-10-16

## 2023-10-16 RX ORDER — SODIUM CHLORIDE 0.9 % (FLUSH) 0.9 %
10 SYRINGE (ML) INJECTION AS NEEDED
Status: DISCONTINUED | OUTPATIENT
Start: 2023-10-16 | End: 2023-10-16 | Stop reason: HOSPADM

## 2023-10-16 RX ORDER — RISPERIDONE 3 MG/1
4 TABLET ORAL 2 TIMES DAILY
COMMUNITY

## 2023-10-16 RX ADMIN — DIPHENHYDRAMINE HYDROCHLORIDE 25 MG: 50 INJECTION, SOLUTION INTRAMUSCULAR; INTRAVENOUS at 20:23

## 2023-10-16 RX ADMIN — HALOPERIDOL LACTATE 5 MG: 5 INJECTION, SOLUTION INTRAMUSCULAR at 20:25

## 2023-10-16 NOTE — ED PROVIDER NOTES
Time: 3:36 PM EDT  Date of encounter:  10/16/2023  Independent Historian/Clinical History and Information was obtained by:   Adult caregiver    History is limited by: Cognitive Impairment    Chief Complaint   Patient presents with    Psychiatric Evaluation         History of Present Illness:  Patient is a 63 y.o. year old female with history of intellectual disability who presents to the emergency department for evaluation of increasing incidence of abnormal and aggressive and combative behavior.    Caregiver from adult  is states that the patient displays volatile behavior, will be fine one moment and then aggressive the next, usually worsening at night.  She is essentially showing signs of delirium usually in the evenings seeing spiders all over her, seeing things that are not there, very abnormal behavior and unable to sleep sometimes for nights in a row.      States the symptoms started about June or July and has progressively worsened.  Patient was evaluated by urologist as well as neurologist which could not identify a cause for her behavior.    No recent falls or head injury or illness reported.      Patient Care Team  Primary Care Provider: Tonie Duran DO    Past Medical History:     Allergies   Allergen Reactions    Lorazepam Unknown - High Severity     Past Medical History:   Diagnosis Date    Abdominal bloating     Acid reflux     Anemia     Anxiety     Condition not found     Mental Retardation    Foot pain, bilateral     High blood pressure     High cholesterol     Hyperlipemia     Hypertension     Hypothyroidism     Ingrowing toenail     Mood disorder     Neurologic disorder     Seizure     Thyroid disorder     Thyroid trouble     Tinea unguium      Past Surgical History:   Procedure Laterality Date    ABDOMINAL HYSTERECTOMY      COLONOSCOPY  2018    ENDOSCOPY  2018     Family History   Problem Relation Age of Onset    No Known Problems Mother     No Known Problems Father        Home  Medications:  Prior to Admission medications    Medication Sig Start Date End Date Taking? Authorizing Provider   atorvastatin (LIPITOR) 20 MG tablet TAKE ONE TABLET BY MOUTH EVERY NIGHT FOR CHOLESTEROL. 8/31/23   Tonie Duran DO   benztropine (COGENTIN) 2 MG tablet Take 1 tablet by mouth 2 (Two) Times a Day. 6/1/23   Keith Capone MD   Calcium Carb-Cholecalciferol (OYSCO 500 + D) 500-5 MG-MCG tablet per tablet Take 1 tablet by mouth Daily. 8/11/23   Tonie Duran DO   cholecalciferol (Vitamin D) 25 MCG (1000 UT) tablet Take 1 tablet by mouth Daily. 4/14/23   Tonie Duran DO   divalproex (DEPAKOTE) 250 MG DR tablet Take 1 tablet by mouth 2 (Two) Times a Day. 7/20/23   Keith Capone MD   docusate sodium (COLACE) 100 MG capsule Take 1 capsule by mouth 2 (Two) Times a Day. 7/3/23   Tonie Duran DO   dorzolamide (TRUSOPT) 2 % ophthalmic solution 1 drop 2 (Two) Times a Day.    Keith Capone MD   ferrous sulfate 324 MG tablet delayed-release Take 1 tablet by mouth Daily With Breakfast. 9/9/22   Tonie Duran DO   folic acid (FOLVITE) 1 MG tablet Take 1 tablet by mouth Daily. 4/14/23   Tonie Duran DO   haloperidol (HALDOL) 10 MG tablet Take 1 tablet by mouth 2 (Two) Times a Day.    Keith Capone MD   levothyroxine (SYNTHROID, LEVOTHROID) 50 MCG tablet Take 1 tablet by mouth Daily. Take on empty stomach daily at 07:00 4/14/23   Tonie Duran DO   lisinopril (PRINIVIL,ZESTRIL) 5 MG tablet TAKE ONE TABLET BY MOUTH ONCE DAILY 8/31/23   Tonie Duran DO   phenazopyridine (Pyridium) 200 MG tablet Take 1 tablet by mouth 3 (Three) Times a Day As Needed for Bladder Spasms. 9/21/23   Tonie Duran DO   polyethylene glycol (MIRALAX) 17 g packet Take 17 g by mouth Daily. 6/5/23   Tonie Duran DO   senna (HM Senna) 8.6 MG tablet Take 1 tablet by mouth 2 (Two) Times a Day. 4/14/23   Tonie Duran DO   sulfamethoxazole-trimethoprim (Bactrim DS) 800-160 MG per tablet Take 1 tablet by mouth  "2 (Two) Times a Day. 9/21/23   Tonie Duran,    tamsulosin (FLOMAX) 0.4 MG capsule 24 hr capsule Take 1 capsule by mouth Daily. 8/15/23   Nishant Alexander MD   Vitamin A 3 MG (43118 UT) capsule Take 1 capsule by mouth Daily.    Provider, MD Keith   vitamin B-12 (CYANOCOBALAMIN) 1000 MCG tablet Take 1 tablet by mouth Daily. 7/3/23   Tonie Duran DO   ziprasidone (GEODON) 60 MG capsule Take 1 capsule by mouth 2 (Two) Times a Day With Meals.    Provider, MD Keith        Social History:   Social History     Tobacco Use    Smoking status: Never     Passive exposure: Current    Smokeless tobacco: Never    Tobacco comments:     second hand smoke exposure-never   Vaping Use    Vaping Use: Never used   Substance Use Topics    Alcohol use: Never    Drug use: Never         Review of Systems:  Review of Systems   Psychiatric/Behavioral:  Positive for behavioral problems.         Physical Exam:  /72 (BP Location: Left arm, Patient Position: Sitting)   Pulse 71   Temp 97.6 °F (36.4 °C) (Oral)   Resp 18   Ht 165.1 cm (65\")   Wt 72.6 kg (160 lb 0.9 oz)   SpO2 97%   BMI 26.63 kg/m²         General: Awake alert and in no obvious distress, appears somewhat anxious and has a tremor at baseline, minimally verbal here    HEENT: Head normocephalic atraumatic, eyes PERRLA EOMI, nose normal, oropharynx normal.    Neck: Supple full range of motion, no meningismus, no lymphadenopathy    Heart: Regular rate and rhythm, no murmurs or rubs, 2+ radial pulses bilaterally    Lungs: Clear to auscultation bilaterally without wheezes or crackles, no respiratory distress    Abdomen: Soft, nontender, nondistended, no rebound or guarding    Skin: Warm, dry, no rash    Musculoskeletal: Normal range of motion, no lower extremity edema    Neurologic: Apparently is at her baseline mentation per caregivers, sitting upright and coloring in a coloring book, no motor deficits no sensory deficits, moving all 4 extremities, " minimally verbal at baseline    Psychiatric: Mood appears stable, no psychosis              Procedures:  Procedures      Medical Decision Making:      Comorbidities that affect care:    Mood disorder, intellectual disability    External Notes reviewed:    Previous Radiological Studies: Patient recently had a CT of the head in the past month or 2 that was negative for acute findings.      The following orders were placed and all results were independently analyzed by me:  Orders Placed This Encounter   Procedures    [facility] Draw    Comprehensive Metabolic Panel    Acetaminophen Level    Ethanol    Salicylate Level    Urine Drug Screen - Urine, Clean Catch    CBC Auto Differential    Urinalysis With Culture If Indicated - Urine, Clean Catch    Urinalysis, Microscopic Only - Urine, Clean Catch    NPO Diet NPO Type: Strict NPO    Continuous Pulse Oximetry    Vital Signs    Undress & Gown    Psych / Access to See    Oxygen Therapy- Nasal Cannula; Titrate 1-6 LPM Per SpO2; 90 - 95%    POC Glucose Once    Insert Peripheral IV    Suicide Precautions    CBC & Differential    Green Top (Gel)    Lavender Top    Gold Top - SST    Light Blue Top       Medications Given in the Emergency Department:  Medications   sodium chloride 0.9 % flush 10 mL (has no administration in time range)   haloperidol lactate (HALDOL) injection 5 mg (5 mg Intramuscular Given 10/16/23 2025)   diphenhydrAMINE (BENADRYL) injection 25 mg (25 mg Intramuscular Given 10/16/23 2023)        ED Course:    The patient was initially evaluated in the triage area where orders were placed. The patient was later dispositioned by Xavier Guzman MD.      The patient was advised to stay for completion of workup which includes but is not limited to communication of labs and radiological results, reassessment and plan. The patient was advised that leaving prior to disposition by a provider could result in critical findings that are not communicated to the patient.           Labs:    Lab Results (last 24 hours)       Procedure Component Value Units Date/Time    CBC & Differential [[id number]]  (Abnormal) Collected: 10/16/23 1536    Specimen: Blood from Arm, Left Updated: 10/16/23 1549    Narrative:      The following orders were created for panel order CBC & Differential.  Procedure                               Abnormality         Status                     ---------                               -----------         ------                     CBC Auto Differential[[id number]]        Abnormal            Final result                 Please view results for these tests on the individual orders.    Comprehensive Metabolic Panel [[id number]]  (Abnormal) Collected: 10/16/23 1536    Specimen: Blood from Arm, Left Updated: 10/16/23 1610     Glucose 105 mg/dL      BUN 7 mg/dL      Creatinine 1.10 mg/dL      Sodium 133 mmol/L      Potassium 4.4 mmol/L      Chloride 96 mmol/L      CO2 27.1 mmol/L      Calcium 9.3 mg/dL      Total Protein 6.7 g/dL      Albumin 4.0 g/dL      ALT (SGPT) 15 U/L      AST (SGOT) 38 U/L      Alkaline Phosphatase 105 U/L      Total Bilirubin 0.2 mg/dL      Globulin 2.7 gm/dL      A/G Ratio 1.5 g/dL      BUN/Creatinine Ratio 6.4     Anion Gap 9.9 mmol/L      eGFR 56.6 mL/min/1.73     Narrative:      GFR Normal >60  Chronic Kidney Disease <60  Kidney Failure <15      Acetaminophen Level [[id number]]  (Normal) Collected: 10/16/23 1536    Specimen: Blood from Arm, Left Updated: 10/16/23 1610     Acetaminophen <5.0 mcg/mL     Ethanol [[id number]] Collected: 10/16/23 1536    Specimen: Blood from Arm, Left Updated: 10/16/23 1610     Ethanol <10 mg/dL      Ethanol % <0.010 %     Narrative:      Ethanol (Plasma)  <10 Essentially Negative    Toxic Concentrations           mg/dL    Flushing, slowing of reflexes    Impaired visual activity         Depression of CNS              >100  Possible Coma                  >300       Salicylate Level [[id number]]  (Normal)  Collected: 10/16/23 1536    Specimen: Blood from Arm, Left Updated: 10/16/23 1610     Salicylate <0.3 mg/dL     CBC Auto Differential [[id number]]  (Abnormal) Collected: 10/16/23 1536    Specimen: Blood from Arm, Left Updated: 10/16/23 1549     WBC 3.73 10*3/mm3      RBC 3.69 10*6/mm3      Hemoglobin 11.2 g/dL      Hematocrit 35.0 %      MCV 94.9 fL      MCH 30.4 pg      MCHC 32.0 g/dL      RDW 13.1 %      RDW-SD 45.4 fl      MPV 8.9 fL      Platelets 199 10*3/mm3      Neutrophil % 48.7 %      Lymphocyte % 34.9 %      Monocyte % 12.9 %      Eosinophil % 2.4 %      Basophil % 0.8 %      Immature Grans % 0.3 %      Neutrophils, Absolute 1.82 10*3/mm3      Lymphocytes, Absolute 1.30 10*3/mm3      Monocytes, Absolute 0.48 10*3/mm3      Eosinophils, Absolute 0.09 10*3/mm3      Basophils, Absolute 0.03 10*3/mm3      Immature Grans, Absolute 0.01 10*3/mm3      nRBC 0.0 /100 WBC     Urine Drug Screen - Urine, Clean Catch [[id number]]  (Normal) Collected: 10/16/23 1641    Specimen: Urine, Clean Catch Updated: 10/16/23 1717     Amphet/Methamphet, Screen Negative     Barbiturates Screen, Urine Negative     Benzodiazepine Screen, Urine Negative     Cocaine Screen, Urine Negative     Opiate Screen Negative     THC, Screen, Urine Negative     Methadone Screen, Urine Negative     Oxycodone Screen, Urine Negative     Fentanyl, Urine Negative    Narrative:      Negative Thresholds Per Drugs Screened:    Amphetamines                 500 ng/ml  Barbiturates                 200 ng/ml  Benzodiazepines              100 ng/ml  Cocaine                      300 ng/ml  Methadone                    300 ng/ml  Opiates                      300 ng/ml  Oxycodone                    100 ng/ml  THC                           50 ng/ml  Fentanyl                       5 ng/ml      The Normal Value for all drugs tested is negative. This report includes final unconfirmed screening results to be used for medical treatment purposes only. Unconfirmed results  must not be used for non-medical purposes such as employment or legal testing. Clinical consideration should be applied to any drug of abuse test, particularly when unconfirmed results are used.            Urinalysis With Culture If Indicated - Urine, Clean Catch [[id number]]  (Abnormal) Collected: 10/16/23 1641    Specimen: Urine, Clean Catch Updated: 10/16/23 1803     Color, UA Yellow     Appearance, UA Slightly Cloudy     pH, UA 6.0     Specific Gravity, UA 1.012     Glucose, UA Negative     Ketones, UA 15 mg/dL (1+)     Bilirubin, UA Negative     Blood, UA Trace     Protein, UA Negative     Leuk Esterase, UA Small (1+)     Nitrite, UA Negative     Urobilinogen, UA 1.0 E.U./dL    Narrative:      In absence of clinical symptoms, the presence of pyuria, bacteria, and/or nitrites on the urinalysis result does not correlate with infection.    Urinalysis, Microscopic Only - Urine, Clean Catch [[id number]]  (Abnormal) Collected: 10/16/23 1641    Specimen: Urine, Clean Catch Updated: 10/16/23 1803     RBC, UA 0-2 /HPF      WBC, UA 0-2 /HPF      Comment: Urine culture not indicated.        Bacteria, UA 3+ /HPF      Squamous Epithelial Cells, UA 0-2 /HPF      Hyaline Casts, UA 3-6 /LPF      Methodology Manual Light Microscopy             Imaging:    No Radiology Exams Resulted Within Past 24 Hours      Differential Diagnosis and Discussion:      Altered Mental Status: Based on the patient's signs and symptoms, differential diagnosis includes but is not limited to meningitis, stroke, sepsis, subarachnoid hemorrhage, intracranial bleeding, encephalitis, and metabolic encephalopathy.    All labs were reviewed and interpreted by me.    MDM     Amount and/or Complexity of Data Reviewed  Clinical lab tests: reviewed             This patient is a 63-year-old female with history of intellectual disability and psychiatric condition on multiple antipsychotic meds including Haldol and respite all, now presents with worsening agitation  and abnormal behavior over the past few months.    I reviewed her records and it looks like she is already had a CT of the head a couple months ago for similar complaints and was negative for acute findings.    She is moving all extremities and I do not see signs of an acute ischemic stroke.    It looks like some of her behaviors probably psychiatric in etiology but also there may be a component of adverse medication effect as well.    I am checking screening lab work to medically clear her and also having her evaluated by our licensed clinical  here in the ED and also [facility] facility.    It looks like she may need to be sent to an inpatient psychiatric facility to have her meds adjusted.      Unfortunately, our [facility] inpatient psych unit is full and we have no available beds here.    I had our ED  calling multiple facilities for this patient, but she was declined multiple times due to her intellectual disability at baseline.    We also called [facility], who declined her, due to her ID as well.    At this time during the evening shift in the ED, I may just give her an extra dose of Haldol here and some Benadryl to keep her calm tonight and she may have to go back with her current caretakers and we will have to come up with a plan of transfer in the morning.          Patient Care Considerations:          Consultants/Shared Management Plan:    This plan of management was discussed with our ED licensed clinical  and with [facility]    Social Determinants of Health:    Patient has presented with family members who are responsible, reliable and will ensure follow up care.      Disposition and Care Coordination:    Discharged: I considered escalation of care by admitting this patient for observation, however the patient has improved and is suitable and  stable for discharge.    I have explained the patient´s condition, diagnoses and treatment plan based on the  information available to me at this time. I have answered questions and addressed any concerns. The patient has a good  understanding of the patient´s diagnosis, condition, and treatment plan as can be expected at this point. The vital signs have been stable. The patient´s condition is stable and appropriate for discharge from the emergency department.      The patient will pursue further outpatient evaluation with the primary care physician or other designated or consulting physician as outlined in the discharge instructions. They are agreeable to this plan of care and follow-up instructions have been explained in detail. The patient has received these instructions in written format and have expressed an understanding of the discharge instructions. The patient is aware that any significant change in condition or worsening of symptoms should prompt an immediate return to this or the closest emergency department or call to 1.  I have explained discharge medications and the need for follow up with the patient/caretakers. This was also printed in the discharge instructions. Patient was discharged with the following medications and follow up:      Medication List      No changes were made to your prescriptions during this visit.      Tonie Duran  [address]  [address] [address] [phone]    Call in 2 days  As needed, If symptoms worsen, for a follow-up appointment    [facility]  Ira Scales (Crisis )    [phone]  Call in 1 day(s)  Please contact DDID services on 10/17/23.       Final diagnoses:   Behavior disturbance   Psychiatric disturbance        ED Disposition       ED Disposition   Discharge    Condition   Stable    Comment   --               This medical record created using voice recognition software.             Xavier Guzman MD  10/16/23 8930

## 2023-10-16 NOTE — SIGNIFICANT NOTE
10/16/23 1925   Plan   Final Note SW notified Joanna LESTER, of pts arrival to ED this date and provided an update.

## 2023-10-16 NOTE — SIGNIFICANT NOTE
"   10/16/23 1911   Behavior WDL   Behavior WDL interactions;motor movement;X   Interactions guarded   Motor Movement tremor;psychomotor retardation   Emotion Mood WDL   Emotion/Mood/Affect WDL affect;emotion mood;X   Affect flat   Emotion/Mood fearful   Speech WDL   Speech WDL speech;X   Speech stuttering;rambling;spontaneous   Perceptual State WDL   Perceptual State WDL hallucinations;perceptual state;X   Hallucinations denies hallucinations;other (see comments)  (Pt denied, however, caregivers reports hallucinations)   Perceptual State other (see comments)  (Per caregivers pt is MMR and has been with a guardian for approx 12+ years.)   Thought Process WDL   Thought Process WDL delusions;judgment and insight;thought content;thought process   Delusions other (see comments)  (Unable to assess)   Judgment and Insight inappropriately focused on discharge;other (see comments)  (Pt began screaming \"I wanna go home\" once SW left the room)   Thought Content clang association   Thought Process disorganized   Intellectual Performance WDL   Intellectual Performance WDL intellectual performance;level of consciousness;X   Intellectual Performance mind wandering;memory deficit, remote;unable to understand explanation/reasoning   Level of Consciousness Alert   Coping/Stress   Major Change/Loss/Stressor mental health condition;caregiver strain   Patient Personal Strengths strong support system   Sources of Support community support   Techniques to [place] with Loss/Stress/Change medication;counseling;other (see comments)  (DDID involvement)   Reaction to Health Status fear   C-SSRS (Recent)   Q1 Wished to be Dead (Past Month) no   Q2 Suicidal Thoughts (Past Month) no   Q6 Suicide Behavior (Lifetime) no   Violence Risk   Feels Like Hurting Others no   Previous Attempt to Harm Others no     SW met with pt at bedside this date, along with caregivers Dori and Ingrid. Per their report, pt is MMR and currently resides in their group home. " "Dori reports that pt was in a home with a caregiver/ and she became extremely combative and they recently said they can no longer care for her. Dori reports that pt has been hitting herself and others, screaming out and experiencing hallucinations. Pt has a current DIDD crisis case with Ira Rodrigues and Loyda was here earlier this date with pt to observe pt and her behaviors. Pt was observed by this SW resting in her bed and looking around the room. SW did attempt to assess pt, however, pt would ramble/slur her words or make random comments. Per Dori, this is not pts normal as her baseline is fairly engaging and will talk to you. Pt denied SI/HI and hallucinations. Caregivers reports that she has been experiencing \"sundowners\" and will flip her recliner backwards to where she back flips causes bruises. They report that she has become aggressive with staff and hits them/pulls hair. Per Dori the onset is July 2023 after a UTI and they have ruled out everything medical at this point. Dori reports that pt has no prior inpatient stays and is in need of a higher level of care to stabilize.     After SW left the room, pt began gripping the bed and screaming loudly disrupting the ED. Pt was screaming and then stating \"I want to go home\". SW entered the room to assist in deescalating the pt and pt was able to safely deescalate with SW. ANGELICA spoke with pt in a calm voice and asked pt if she would like to color. Pt stated \"yes\". SW provided pt with coloring book and crayons this date. SW attempted to leave the room after calming pt, however, pt requested SW stay and color with pt.     ANGELICA updated vivienne Rodriguez MSW CSW  "

## 2023-10-17 NOTE — SIGNIFICANT NOTE
10/16/23 2031   Plan   Final Note ANGELICA spoke with [facility] this date, they do not have an appropriate treatment program for pt. ANGELICA contacted [facility] and they also do not an appropriate treatment program. [facility] advises that there is not a facility in the [place] area that has a program for DDID. ANGELICA contacted [facility] and spoke with the oncall physical who reports that pt is not appropriate for their facility and would need to go to [facility]; ANGELICA explained [facility] is full and this was the reason for the call referral. Lyo again stated pt is not appropriate for their facility.

## 2023-10-17 NOTE — SIGNIFICANT NOTE
"   10/16/23 2033   Plan   Final Note ANGELICA spoke with pts caregiver, Dori, regarding discharge. Per nurse, caregiver had reported to give pt a shot and they would take her home. Dori became emotional and reports that there are no services for this population and \"what am I supposed to do with her'. Dori kept stating, \"if I refuse to take her as the caregiver, are you gonna call [facility]PS\". Pts caregiver did agree to take pt this date and was agreeable to discharge plan with follow up with DDID. ANGELICA updated provider.       "

## 2023-10-17 NOTE — DISCHARGE INSTRUCTIONS
Unfortunately, our [facility] inpatient psych unit is full and patient was also declined at [facility] psychiatry.    We gave her a shot of Geodon and Benadryl here in the ED to calm her down.

## 2023-10-17 NOTE — SIGNIFICANT NOTE
10/16/23 2035   Plan   Final Note SW spoke marni Echols at DDID to provide update on pt. Joanna reports she will follow up with Loyda, the crisis .

## 2023-10-23 ENCOUNTER — OFFICE VISIT (OUTPATIENT)
Dept: FAMILY MEDICINE CLINIC | Facility: CLINIC | Age: 63
End: 2023-10-23
Payer: MEDICARE

## 2023-10-23 VITALS
TEMPERATURE: 97.9 F | OXYGEN SATURATION: 98 % | WEIGHT: 149.6 LBS | SYSTOLIC BLOOD PRESSURE: 132 MMHG | BODY MASS INDEX: 24.89 KG/M2 | HEART RATE: 75 BPM | DIASTOLIC BLOOD PRESSURE: 81 MMHG

## 2023-10-23 DIAGNOSIS — I10 ESSENTIAL HYPERTENSION: Chronic | ICD-10-CM

## 2023-10-23 DIAGNOSIS — R82.90 BAD ODOR OF URINE: ICD-10-CM

## 2023-10-23 DIAGNOSIS — N39.0 URINARY TRACT INFECTION WITHOUT HEMATURIA, SITE UNSPECIFIED: ICD-10-CM

## 2023-10-23 DIAGNOSIS — R41.82 ALTERED MENTAL STATUS, UNSPECIFIED ALTERED MENTAL STATUS TYPE: ICD-10-CM

## 2023-10-23 DIAGNOSIS — R30.0 DYSURIA: Primary | ICD-10-CM

## 2023-10-23 DIAGNOSIS — R30.0 DYSURIA: ICD-10-CM

## 2023-10-23 LAB
BILIRUB BLD-MCNC: NEGATIVE MG/DL
CLARITY, POC: CLEAR
COLOR UR: YELLOW
EXPIRATION DATE: ABNORMAL
GLUCOSE UR STRIP-MCNC: NEGATIVE MG/DL
KETONES UR QL: ABNORMAL
LEUKOCYTE EST, POC: ABNORMAL
Lab: ABNORMAL
NITRITE UR-MCNC: POSITIVE MG/ML
PH UR: 6.5 [PH] (ref 5–8)
PROT UR STRIP-MCNC: ABNORMAL MG/DL
RBC # UR STRIP: ABNORMAL /UL
SP GR UR: 1.02 (ref 1–1.03)
UROBILINOGEN UR QL: NORMAL

## 2023-10-23 PROCEDURE — 87186 SC STD MICRODIL/AGAR DIL: CPT | Performed by: NURSE PRACTITIONER

## 2023-10-23 PROCEDURE — 3075F SYST BP GE 130 - 139MM HG: CPT | Performed by: NURSE PRACTITIONER

## 2023-10-23 PROCEDURE — 81003 URINALYSIS AUTO W/O SCOPE: CPT | Performed by: NURSE PRACTITIONER

## 2023-10-23 PROCEDURE — 99214 OFFICE O/P EST MOD 30 MIN: CPT | Performed by: NURSE PRACTITIONER

## 2023-10-23 PROCEDURE — 1159F MED LIST DOCD IN RCRD: CPT | Performed by: NURSE PRACTITIONER

## 2023-10-23 PROCEDURE — 87086 URINE CULTURE/COLONY COUNT: CPT | Performed by: NURSE PRACTITIONER

## 2023-10-23 PROCEDURE — 1160F RVW MEDS BY RX/DR IN RCRD: CPT | Performed by: NURSE PRACTITIONER

## 2023-10-23 PROCEDURE — 87077 CULTURE AEROBIC IDENTIFY: CPT | Performed by: NURSE PRACTITIONER

## 2023-10-23 PROCEDURE — 3079F DIAST BP 80-89 MM HG: CPT | Performed by: NURSE PRACTITIONER

## 2023-10-23 RX ORDER — DEUTETRABENAZINE 6 MG/1
TABLET, COATED ORAL
COMMUNITY
Start: 2023-10-02

## 2023-10-23 RX ORDER — OLANZAPINE 10 MG/1
TABLET ORAL
COMMUNITY
Start: 2023-09-21

## 2023-10-23 RX ORDER — OXCARBAZEPINE 300 MG/1
TABLET, FILM COATED ORAL
COMMUNITY
Start: 2023-09-27

## 2023-10-23 RX ORDER — MEMANTINE HYDROCHLORIDE 5 MG/1
TABLET ORAL
COMMUNITY
Start: 2023-10-12

## 2023-10-23 RX ORDER — OMEPRAZOLE 20 MG/1
CAPSULE, DELAYED RELEASE ORAL
COMMUNITY
Start: 2023-10-16

## 2023-10-23 RX ORDER — SULFAMETHOXAZOLE AND TRIMETHOPRIM 800; 160 MG/1; MG/1
1 TABLET ORAL 2 TIMES DAILY
Qty: 14 TABLET | Refills: 0 | Status: SHIPPED | OUTPATIENT
Start: 2023-10-23 | End: 2023-10-30

## 2023-10-23 NOTE — PROGRESS NOTES
"Chief Complaint  burning while urinating  (Symptoms started this morning ) and urine odor    Subjective        Gela Michaud presents to [facility]  History of Present Illness  Pt presents with emergency caregiver c/o dysuria and foul-odor beginning this morning. Pt is in emergency respite care d/t recent behavioral issues. Caregiver states psychiatry is evaluating. Denies fever, chills, SOB, chest pain, N/V/D, or other. Pt with frequent UTI's. Caregiver states bactrim works well for treatment.     Reviewed all recent labs and medications.      Objective   Vital Signs:  /81   Pulse 75   Temp 97.9 °F (36.6 °C) (Temporal)   Wt 67.9 kg (149 lb 9.6 oz)   SpO2 98%   BMI 24.89 kg/m²   Estimated body mass index is 24.89 kg/m² as calculated from the following:    Height as of 10/16/23: 165.1 cm (65\").    Weight as of this encounter: 67.9 kg (149 lb 9.6 oz).               Physical Exam  Vitals reviewed.   Constitutional:       General: She is not in acute distress.  HENT:      Head: Normocephalic.      Right Ear: Tympanic membrane normal.      Left Ear: Tympanic membrane normal.      Nose: Nose normal.      Mouth/Throat:      Pharynx: Oropharynx is clear. No posterior oropharyngeal erythema.   Eyes:      General: No scleral icterus.     Extraocular Movements: Extraocular movements intact.      Conjunctiva/sclera: Conjunctivae normal.      Pupils: Pupils are equal, round, and reactive to light.   Cardiovascular:      Rate and Rhythm: Normal rate and regular rhythm.      Pulses: Normal pulses.      Heart sounds: Normal heart sounds.   Pulmonary:      Effort: Pulmonary effort is normal.      Breath sounds: Normal breath sounds.   Abdominal:      General: Bowel sounds are normal.      Palpations: Abdomen is soft.   Musculoskeletal:         General: Normal range of motion.      Cervical back: Neck supple.   Skin:     General: Skin is warm and dry.   Neurological:      Mental Status: " She is alert and oriented to person, place, and time.   Psychiatric:         Attention and Perception: Attention normal.         Mood and Affect: Mood normal.         Behavior: Behavior normal. Behavior is cooperative.      Comments: Pt appears at baseline        Result Review :    Common labs          8/1/2023    22:36 8/6/2023    14:04 10/16/2023    15:36   Common Labs   Glucose 119  136  105    BUN 17  7  7    Creatinine 1.14  1.09  1.10    Sodium 138  143  133    Potassium 4.5  3.9  4.4    Chloride 103  106  96    Calcium 9.3  8.9  9.3    Albumin 4.1  3.6  4.0    Total Bilirubin 0.2  0.2  0.2    Alkaline Phosphatase 95  84  105    AST (SGOT) 46  29  38    ALT (SGPT) 16  9  15    WBC 6.61  8.75  3.73    Hemoglobin 12.4  11.8  11.2    Hematocrit 38.4  35.8  35.0    Platelets 195  176  199      Data reviewed : Radiologic studies CT head , Consultant notes urology, neurology , and Recent hospitalization notes 10/16 ED              Assessment and Plan   Diagnoses and all orders for this visit:    1. Dysuria (Primary)  -     POCT urinalysis dipstick, automated  -     Urine Culture - Urine, Urine, Clean Catch; Future    2. Bad odor of urine  -     POCT urinalysis dipstick, automated  -     Urine Culture - Urine, Urine, Clean Catch; Future    3. Urinary tract infection without hematuria, site unspecified  Comments:  bactrim DS 1 tab PO BID x 7 days   Increase rest/increase clear fluids   urine culture sent    4. Essential hypertension  Assessment & Plan:  Hypertension is improving with treatment.  Continue current treatment regimen.  Dietary sodium restriction.  Weight loss.  Regular aerobic exercise.  Blood pressure will be reassessed at the next regular appointment.        5. Altered mental status, unspecified altered mental status type  Assessment & Plan:  Keep all FU with psychiatry   Pt on numerous psychiatric medications       Other orders  -     sulfamethoxazole-trimethoprim (Bactrim DS) 800-160 MG per tablet;  Take 1 tablet by mouth 2 (Two) Times a Day for 7 days.  Dispense: 14 tablet; Refill: 0             Follow Up   Return if symptoms worsen or fail to improve.  Patient was given instructions and counseling regarding her condition or for health maintenance advice. Please see specific information pulled into the AVS if appropriate.

## 2023-10-25 LAB — BACTERIA SPEC AEROBE CULT: ABNORMAL

## 2023-11-03 ENCOUNTER — CLINICAL SUPPORT (OUTPATIENT)
Dept: FAMILY MEDICINE CLINIC | Facility: CLINIC | Age: 63
End: 2023-11-03
Payer: MEDICARE

## 2023-11-03 ENCOUNTER — LAB (OUTPATIENT)
Dept: LAB | Facility: HOSPITAL | Age: 63
End: 2023-11-03
Payer: MEDICARE

## 2023-11-03 ENCOUNTER — TELEPHONE (OUTPATIENT)
Dept: FAMILY MEDICINE CLINIC | Facility: CLINIC | Age: 63
End: 2023-11-03
Payer: MEDICARE

## 2023-11-03 DIAGNOSIS — R30.0 DYSURIA: ICD-10-CM

## 2023-11-03 DIAGNOSIS — R41.82 ALTERED MENTAL STATUS, UNSPECIFIED ALTERED MENTAL STATUS TYPE: ICD-10-CM

## 2023-11-03 DIAGNOSIS — R33.9 URINARY RETENTION: ICD-10-CM

## 2023-11-03 DIAGNOSIS — R33.9 URINARY RETENTION: Primary | ICD-10-CM

## 2023-11-03 DIAGNOSIS — I10 ESSENTIAL HYPERTENSION: ICD-10-CM

## 2023-11-03 DIAGNOSIS — I10 ESSENTIAL HYPERTENSION: Primary | Chronic | ICD-10-CM

## 2023-11-03 LAB
BACTERIA UR QL AUTO: NORMAL /HPF
BASOPHILS # BLD AUTO: 0.05 10*3/MM3 (ref 0–0.2)
BASOPHILS NFR BLD AUTO: 1.4 % (ref 0–1.5)
BILIRUB BLD-MCNC: NEGATIVE MG/DL
BILIRUB UR QL STRIP: NEGATIVE
CLARITY UR: CLEAR
CLARITY, POC: CLEAR
COLOR UR: YELLOW
COLOR UR: YELLOW
DEPRECATED RDW RBC AUTO: 40.6 FL (ref 37–54)
EOSINOPHIL # BLD AUTO: 0.13 10*3/MM3 (ref 0–0.4)
EOSINOPHIL NFR BLD AUTO: 3.6 % (ref 0.3–6.2)
ERYTHROCYTE [DISTWIDTH] IN BLOOD BY AUTOMATED COUNT: 12.4 % (ref 12.3–15.4)
EXPIRATION DATE: NORMAL
GLUCOSE UR STRIP-MCNC: NEGATIVE MG/DL
GLUCOSE UR STRIP-MCNC: NEGATIVE MG/DL
HCT VFR BLD AUTO: 36 % (ref 34–46.6)
HGB BLD-MCNC: 12.2 G/DL (ref 12–15.9)
HGB UR QL STRIP.AUTO: NEGATIVE
HYALINE CASTS UR QL AUTO: NORMAL /LPF
IMM GRANULOCYTES # BLD AUTO: 0.01 10*3/MM3 (ref 0–0.05)
IMM GRANULOCYTES NFR BLD AUTO: 0.3 % (ref 0–0.5)
KETONES UR QL STRIP: NEGATIVE
KETONES UR QL: NEGATIVE
LEUKOCYTE EST, POC: NEGATIVE
LEUKOCYTE ESTERASE UR QL STRIP.AUTO: NEGATIVE
LYMPHOCYTES # BLD AUTO: 1.37 10*3/MM3 (ref 0.7–3.1)
LYMPHOCYTES NFR BLD AUTO: 37.5 % (ref 19.6–45.3)
Lab: NORMAL
MAGNESIUM SERPL-MCNC: 2.4 MG/DL (ref 1.6–2.4)
MCH RBC QN AUTO: 30.8 PG (ref 26.6–33)
MCHC RBC AUTO-ENTMCNC: 33.9 G/DL (ref 31.5–35.7)
MCV RBC AUTO: 90.9 FL (ref 79–97)
MONOCYTES # BLD AUTO: 0.39 10*3/MM3 (ref 0.1–0.9)
MONOCYTES NFR BLD AUTO: 10.7 % (ref 5–12)
NEUTROPHILS NFR BLD AUTO: 1.7 10*3/MM3 (ref 1.7–7)
NEUTROPHILS NFR BLD AUTO: 46.5 % (ref 42.7–76)
NITRITE UR QL STRIP: NEGATIVE
NITRITE UR-MCNC: NEGATIVE MG/ML
NRBC BLD AUTO-RTO: 0 /100 WBC (ref 0–0.2)
PH UR STRIP.AUTO: 5.5 [PH] (ref 5–8)
PH UR: 5.5 [PH] (ref 5–8)
PLATELET # BLD AUTO: 236 10*3/MM3 (ref 140–450)
PMV BLD AUTO: 10.5 FL (ref 6–12)
PROT UR QL STRIP: NEGATIVE
PROT UR STRIP-MCNC: NEGATIVE MG/DL
RBC # BLD AUTO: 3.96 10*6/MM3 (ref 3.77–5.28)
RBC # UR STRIP: NEGATIVE /UL
RBC # UR STRIP: NORMAL /HPF
REF LAB TEST METHOD: NORMAL
SP GR UR STRIP: 1.01 (ref 1–1.03)
SP GR UR: 1 (ref 1–1.03)
SQUAMOUS #/AREA URNS HPF: NORMAL /HPF
UROBILINOGEN UR QL STRIP: NORMAL
UROBILINOGEN UR QL: NORMAL
WBC # UR STRIP: NORMAL /HPF
WBC NRBC COR # BLD: 3.65 10*3/MM3 (ref 3.4–10.8)

## 2023-11-03 PROCEDURE — 87086 URINE CULTURE/COLONY COUNT: CPT | Performed by: FAMILY MEDICINE

## 2023-11-03 PROCEDURE — 81001 URINALYSIS AUTO W/SCOPE: CPT | Performed by: FAMILY MEDICINE

## 2023-11-03 PROCEDURE — 36415 COLL VENOUS BLD VENIPUNCTURE: CPT

## 2023-11-03 PROCEDURE — 85025 COMPLETE CBC W/AUTO DIFF WBC: CPT

## 2023-11-03 PROCEDURE — 83735 ASSAY OF MAGNESIUM: CPT

## 2023-11-03 PROCEDURE — 80053 COMPREHEN METABOLIC PANEL: CPT

## 2023-11-04 LAB
ALBUMIN SERPL-MCNC: 4 G/DL (ref 3.5–5.2)
ALBUMIN/GLOB SERPL: 1.4 G/DL
ALP SERPL-CCNC: 74 U/L (ref 39–117)
ALT SERPL W P-5'-P-CCNC: 22 U/L (ref 1–33)
ANION GAP SERPL CALCULATED.3IONS-SCNC: 14 MMOL/L (ref 5–15)
AST SERPL-CCNC: 68 U/L (ref 1–32)
BACTERIA SPEC AEROBE CULT: NO GROWTH
BILIRUB SERPL-MCNC: 0.2 MG/DL (ref 0–1.2)
BUN SERPL-MCNC: 21 MG/DL (ref 8–23)
BUN/CREAT SERPL: 11.5 (ref 7–25)
CALCIUM SPEC-SCNC: 9.7 MG/DL (ref 8.6–10.5)
CHLORIDE SERPL-SCNC: 97 MMOL/L (ref 98–107)
CO2 SERPL-SCNC: 22 MMOL/L (ref 22–29)
CREAT SERPL-MCNC: 1.82 MG/DL (ref 0.57–1)
EGFRCR SERPLBLD CKD-EPI 2021: 30.9 ML/MIN/1.73
GLOBULIN UR ELPH-MCNC: 2.9 GM/DL
GLUCOSE SERPL-MCNC: 105 MG/DL (ref 65–99)
POTASSIUM SERPL-SCNC: 5.3 MMOL/L (ref 3.5–5.2)
PROT SERPL-MCNC: 6.9 G/DL (ref 6–8.5)
SODIUM SERPL-SCNC: 133 MMOL/L (ref 136–145)

## 2023-11-05 DIAGNOSIS — R33.9 URINARY RETENTION: Primary | ICD-10-CM

## 2023-11-14 ENCOUNTER — LAB (OUTPATIENT)
Dept: LAB | Facility: HOSPITAL | Age: 63
End: 2023-11-14
Payer: MEDICARE

## 2023-11-14 ENCOUNTER — OFFICE VISIT (OUTPATIENT)
Dept: FAMILY MEDICINE CLINIC | Facility: CLINIC | Age: 63
End: 2023-11-14
Payer: MEDICARE

## 2023-11-14 VITALS
BODY MASS INDEX: 26.23 KG/M2 | TEMPERATURE: 97.8 F | HEIGHT: 65 IN | RESPIRATION RATE: 18 BRPM | WEIGHT: 157.4 LBS | DIASTOLIC BLOOD PRESSURE: 80 MMHG | SYSTOLIC BLOOD PRESSURE: 110 MMHG | OXYGEN SATURATION: 97 % | HEART RATE: 77 BPM

## 2023-11-14 DIAGNOSIS — F43.10 PTSD (POST-TRAUMATIC STRESS DISORDER): ICD-10-CM

## 2023-11-14 DIAGNOSIS — R33.9 URINARY RETENTION: ICD-10-CM

## 2023-11-14 DIAGNOSIS — I10 ESSENTIAL HYPERTENSION: Chronic | ICD-10-CM

## 2023-11-14 DIAGNOSIS — G40.909 SEIZURE DISORDER: ICD-10-CM

## 2023-11-14 DIAGNOSIS — F41.9 ANXIETY: ICD-10-CM

## 2023-11-14 DIAGNOSIS — Z00.00 MEDICARE ANNUAL WELLNESS VISIT, SUBSEQUENT: Primary | ICD-10-CM

## 2023-11-14 DIAGNOSIS — R30.0 DYSURIA: ICD-10-CM

## 2023-11-14 DIAGNOSIS — R39.198 DIFFICULTY URINATING: ICD-10-CM

## 2023-11-14 PROBLEM — N40.1 BENIGN PROSTATIC HYPERPLASIA WITH URINARY OBSTRUCTION: Status: RESOLVED | Noted: 2023-08-12 | Resolved: 2023-11-14

## 2023-11-14 PROBLEM — E66.811 CLASS 1 OBESITY DUE TO EXCESS CALORIES WITH SERIOUS COMORBIDITY AND BODY MASS INDEX (BMI) OF 31.0 TO 31.9 IN ADULT: Chronic | Status: RESOLVED | Noted: 2021-09-27 | Resolved: 2023-11-14

## 2023-11-14 PROBLEM — E66.09 CLASS 1 OBESITY DUE TO EXCESS CALORIES WITH SERIOUS COMORBIDITY AND BODY MASS INDEX (BMI) OF 31.0 TO 31.9 IN ADULT: Chronic | Status: RESOLVED | Noted: 2021-09-27 | Resolved: 2023-11-14

## 2023-11-14 PROBLEM — N13.8 BENIGN PROSTATIC HYPERPLASIA WITH URINARY OBSTRUCTION: Status: RESOLVED | Noted: 2023-08-12 | Resolved: 2023-11-14

## 2023-11-14 LAB
ALBUMIN SERPL-MCNC: 3.8 G/DL (ref 3.5–5.2)
ALBUMIN/GLOB SERPL: 1.3 G/DL
ALP SERPL-CCNC: 86 U/L (ref 39–117)
ALT SERPL W P-5'-P-CCNC: 15 U/L (ref 1–33)
ANION GAP SERPL CALCULATED.3IONS-SCNC: 8.3 MMOL/L (ref 5–15)
AST SERPL-CCNC: 32 U/L (ref 1–32)
BACTERIA UR QL AUTO: ABNORMAL /HPF
BASOPHILS # BLD AUTO: 0.03 10*3/MM3 (ref 0–0.2)
BASOPHILS NFR BLD AUTO: 0.5 % (ref 0–1.5)
BILIRUB BLD-MCNC: ABNORMAL MG/DL
BILIRUB SERPL-MCNC: 0.2 MG/DL (ref 0–1.2)
BILIRUB UR QL STRIP: NEGATIVE
BUN SERPL-MCNC: 15 MG/DL (ref 8–23)
BUN/CREAT SERPL: 13.4 (ref 7–25)
CALCIUM SPEC-SCNC: 9.2 MG/DL (ref 8.6–10.5)
CHLORIDE SERPL-SCNC: 98 MMOL/L (ref 98–107)
CLARITY UR: ABNORMAL
CLARITY, POC: CLEAR
CO2 SERPL-SCNC: 26.7 MMOL/L (ref 22–29)
COD CRY URNS QL: ABNORMAL /HPF
COLOR UR: ABNORMAL
COLOR UR: YELLOW
CREAT SERPL-MCNC: 1.12 MG/DL (ref 0.57–1)
DEPRECATED RDW RBC AUTO: 43.9 FL (ref 37–54)
EGFRCR SERPLBLD CKD-EPI 2021: 55.4 ML/MIN/1.73
EOSINOPHIL # BLD AUTO: 0.07 10*3/MM3 (ref 0–0.4)
EOSINOPHIL NFR BLD AUTO: 1.2 % (ref 0.3–6.2)
ERYTHROCYTE [DISTWIDTH] IN BLOOD BY AUTOMATED COUNT: 13.1 % (ref 12.3–15.4)
EXPIRATION DATE: ABNORMAL
GLOBULIN UR ELPH-MCNC: 3 GM/DL
GLUCOSE SERPL-MCNC: 109 MG/DL (ref 65–99)
GLUCOSE UR STRIP-MCNC: NEGATIVE MG/DL
GLUCOSE UR STRIP-MCNC: NEGATIVE MG/DL
HCT VFR BLD AUTO: 32.2 % (ref 34–46.6)
HGB BLD-MCNC: 10.5 G/DL (ref 12–15.9)
HGB UR QL STRIP.AUTO: NEGATIVE
HYALINE CASTS UR QL AUTO: ABNORMAL /LPF
IMM GRANULOCYTES # BLD AUTO: 0.02 10*3/MM3 (ref 0–0.05)
IMM GRANULOCYTES NFR BLD AUTO: 0.4 % (ref 0–0.5)
KETONES UR QL STRIP: ABNORMAL
KETONES UR QL: ABNORMAL
LEUKOCYTE EST, POC: ABNORMAL
LEUKOCYTE ESTERASE UR QL STRIP.AUTO: ABNORMAL
LYMPHOCYTES # BLD AUTO: 1.21 10*3/MM3 (ref 0.7–3.1)
LYMPHOCYTES NFR BLD AUTO: 21.3 % (ref 19.6–45.3)
Lab: ABNORMAL
MAGNESIUM SERPL-MCNC: 2 MG/DL (ref 1.6–2.4)
MCH RBC QN AUTO: 30.1 PG (ref 26.6–33)
MCHC RBC AUTO-ENTMCNC: 32.6 G/DL (ref 31.5–35.7)
MCV RBC AUTO: 92.3 FL (ref 79–97)
MONOCYTES # BLD AUTO: 0.68 10*3/MM3 (ref 0.1–0.9)
MONOCYTES NFR BLD AUTO: 12 % (ref 5–12)
NEUTROPHILS NFR BLD AUTO: 3.67 10*3/MM3 (ref 1.7–7)
NEUTROPHILS NFR BLD AUTO: 64.6 % (ref 42.7–76)
NITRITE UR QL STRIP: NEGATIVE
NITRITE UR-MCNC: NEGATIVE MG/ML
NRBC BLD AUTO-RTO: 0 /100 WBC (ref 0–0.2)
PH UR STRIP.AUTO: 5.5 [PH] (ref 5–8)
PH UR: 5 [PH] (ref 5–8)
PHOSPHATE SERPL-MCNC: 3.3 MG/DL (ref 2.5–4.5)
PLATELET # BLD AUTO: 217 10*3/MM3 (ref 140–450)
PMV BLD AUTO: 9.8 FL (ref 6–12)
POTASSIUM SERPL-SCNC: 4.8 MMOL/L (ref 3.5–5.2)
PROT SERPL-MCNC: 6.8 G/DL (ref 6–8.5)
PROT UR QL STRIP: ABNORMAL
PROT UR STRIP-MCNC: ABNORMAL MG/DL
RBC # BLD AUTO: 3.49 10*6/MM3 (ref 3.77–5.28)
RBC # UR STRIP: ABNORMAL /HPF
RBC # UR STRIP: NEGATIVE /UL
REF LAB TEST METHOD: ABNORMAL
SODIUM SERPL-SCNC: 133 MMOL/L (ref 136–145)
SP GR UR STRIP: 1.02 (ref 1–1.03)
SP GR UR: 1.02 (ref 1–1.03)
SQUAMOUS #/AREA URNS HPF: ABNORMAL /HPF
UROBILINOGEN UR QL STRIP: ABNORMAL
UROBILINOGEN UR QL: NORMAL
WBC # UR STRIP: ABNORMAL /HPF
WBC NRBC COR # BLD: 5.68 10*3/MM3 (ref 3.4–10.8)

## 2023-11-14 PROCEDURE — 87086 URINE CULTURE/COLONY COUNT: CPT | Performed by: FAMILY MEDICINE

## 2023-11-14 PROCEDURE — 81001 URINALYSIS AUTO W/SCOPE: CPT | Performed by: FAMILY MEDICINE

## 2023-11-14 PROCEDURE — 85025 COMPLETE CBC W/AUTO DIFF WBC: CPT

## 2023-11-14 PROCEDURE — 36415 COLL VENOUS BLD VENIPUNCTURE: CPT

## 2023-11-14 PROCEDURE — 84100 ASSAY OF PHOSPHORUS: CPT

## 2023-11-14 PROCEDURE — 80053 COMPREHEN METABOLIC PANEL: CPT

## 2023-11-14 PROCEDURE — 83735 ASSAY OF MAGNESIUM: CPT

## 2023-11-14 PROCEDURE — 87077 CULTURE AEROBIC IDENTIFY: CPT | Performed by: FAMILY MEDICINE

## 2023-11-14 PROCEDURE — 87186 SC STD MICRODIL/AGAR DIL: CPT | Performed by: FAMILY MEDICINE

## 2023-11-14 RX ORDER — PRAZOSIN HYDROCHLORIDE 1 MG/1
CAPSULE ORAL
Qty: 90 CAPSULE | Refills: 5 | Status: SHIPPED | OUTPATIENT
Start: 2023-11-14

## 2023-11-14 RX ORDER — FUROSEMIDE 20 MG/1
20 TABLET ORAL DAILY
Qty: 30 TABLET | Refills: 3 | Status: CANCELLED | OUTPATIENT
Start: 2023-11-14

## 2023-11-14 NOTE — PROGRESS NOTES
The ABCs of the Annual Wellness Visit  Subsequent Medicare Wellness Visit    Subjective    Gela Michaud is a 63 y.o. female who presents for a Subsequent Medicare Wellness Visit.    The following portions of the patient's history were reviewed and   updated as appropriate: allergies, current medications, past family history, past medical history, past social history, past surgical history, and problem list.    Compared to one year ago, the patient feels her physical   health is better.    Compared to one year ago, the patient feels her mental   health is the same.    Recent Hospitalizations:  She was not admitted to the hospital during the last year.       Current Medical Providers:  Patient Care Team:  Tonie Duran DO as PCP - General (Family Medicine)    Outpatient Medications Prior to Visit   Medication Sig Dispense Refill    atorvastatin (LIPITOR) 20 MG tablet TAKE ONE TABLET BY MOUTH EVERY NIGHT FOR CHOLESTEROL. 90 tablet 3    Austedo 6 MG tablet       Calcium Carb-Cholecalciferol (OYSCO 500 + D) 500-5 MG-MCG tablet per tablet Take 1 tablet by mouth Daily. 30 tablet 5    cholecalciferol (Vitamin D) 25 MCG (1000 UT) tablet Take 1 tablet by mouth Daily. 90 tablet 5    cycloSPORINE (RESTASIS) 0.05 % ophthalmic emulsion 1 drop 2 (Two) Times a Day.      docusate sodium (COLACE) 100 MG capsule Take 1 capsule by mouth 2 (Two) Times a Day. 180 capsule 3    dorzolamide (TRUSOPT) 2 % ophthalmic solution 1 drop 2 (Two) Times a Day.      folic acid (FOLVITE) 1 MG tablet Take 1 tablet by mouth Daily. 90 tablet 5    haloperidol (HALDOL) 10 MG tablet Take 1 tablet by mouth 2 (Two) Times a Day.      levothyroxine (SYNTHROID, LEVOTHROID) 50 MCG tablet Take 1 tablet by mouth Daily. Take on empty stomach daily at 07:00 90 tablet 5    lisinopril (PRINIVIL,ZESTRIL) 5 MG tablet TAKE ONE TABLET BY MOUTH ONCE DAILY 90 tablet 3    memantine (NAMENDA) 5 MG tablet       omeprazole (priLOSEC) 20 MG capsule       OXcarbazepine  (TRILEPTAL) 300 MG tablet       polyethylene glycol (MIRALAX) 17 g packet Take 17 g by mouth Daily. 90 each 1    tamsulosin (FLOMAX) 0.4 MG capsule 24 hr capsule Take 1 capsule by mouth Daily. 90 capsule 4    Vitamin A 3 MG (29095 UT) capsule Take 1 capsule by mouth Daily.      vitamin B-12 (CYANOCOBALAMIN) 1000 MCG tablet Take 1 tablet by mouth Daily. 90 tablet 3    ziprasidone (GEODON) 60 MG capsule Take 1 capsule by mouth 2 (Two) Times a Day With Meals.      benztropine (COGENTIN) 2 MG tablet Take 1 tablet by mouth 2 (Two) Times a Day. (Patient not taking: Reported on 11/14/2023)      divalproex (DEPAKOTE) 250 MG DR tablet Take 1 tablet by mouth 2 (Two) Times a Day. (Patient not taking: Reported on 11/14/2023)      ferrous sulfate 324 MG tablet delayed-release Take 1 tablet by mouth Daily With Breakfast. (Patient not taking: Reported on 11/14/2023) 30 tablet 1    OLANZapine (zyPREXA) 10 MG tablet  (Patient not taking: Reported on 11/14/2023)      phenazopyridine (Pyridium) 200 MG tablet Take 1 tablet by mouth 3 (Three) Times a Day As Needed for Bladder Spasms. (Patient not taking: Reported on 11/14/2023) 6 tablet 0    risperiDONE (risperDAL) 3 MG tablet Take 4 mg by mouth 2 (Two) Times a Day. (Patient not taking: Reported on 11/14/2023)      senna (HM Senna) 8.6 MG tablet Take 1 tablet by mouth 2 (Two) Times a Day. (Patient not taking: Reported on 11/14/2023) 180 tablet 5     No facility-administered medications prior to visit.       No opioid medication identified on active medication list. I have reviewed chart for other potential  high risk medication/s and harmful drug interactions in the elderly.        Aspirin is not on active medication list.  Aspirin use is not indicated based on review of current medical condition/s. Risk of harm outweighs potential benefits.  .    Patient Active Problem List   Diagnosis    Altered mental state    Hypercholesteremia    Essential hypertension    Acute hepatitis     "Acquired hypothyroidism    GERD (gastroesophageal reflux disease)    Seizure disorder    Migraine headache    Pain of left hand    Medicare annual wellness visit, subsequent    Anemia    Urinary retention    PTSD (post-traumatic stress disorder)     Advance Care Planning   Advance Care Planning     Advance Directive is not on file.  ACP discussion was declined by the patient. The patient is a tapia of the [facility].      Objective    Vitals:    23 1425 23 1503   BP:  110/80   Pulse: 77    Resp: 18    Temp: 97.8 °F (36.6 °C)    TempSrc: Tympanic    SpO2: 97%    Weight: 71.4 kg (157 lb 6.4 oz)    Height: 165.1 cm (65\")    PainSc: 0-No pain      Estimated body mass index is 26.19 kg/m² as calculated from the following:    Height as of this encounter: 165.1 cm (65\").    Weight as of this encounter: 71.4 kg (157 lb 6.4 oz).    BMI is within normal parameters. No other follow-up for BMI required.      Does the patient have evidence of cognitive impairment? Yes          HEALTH RISK ASSESSMENT    Smoking Status:  Social History     Tobacco Use   Smoking Status Never    Passive exposure: Current   Smokeless Tobacco Never   Tobacco Comments    second hand smoke exposure-never     Alcohol Consumption:  Social History     Substance and Sexual Activity   Alcohol Use Never     Fall Risk Screen:    ALEXUS Fall Risk Assessment was completed, and patient is at MODERATE risk for falls. Assessment completed on:2023    Depression Screenin/14/2023     2:00 PM   PHQ-2/PHQ-9 Depression Screening   Little Interest or Pleasure in Doing Things 0-->not at all   Feeling Down, Depressed or Hopeless 0-->not at all   Trouble Falling or Staying Asleep, or Sleeping Too Much 0-->not at all   Feeling Tired or Having Little Energy 0-->not at all   Poor Appetite or Overeating 0-->not at all   Feeling Bad about Yourself - or that You are a Failure or Have Let Yourself or Your Family Down 0-->not at all   Trouble " Concentrating on Things, Such as Reading the Newspaper or Watching Television 0-->not at all   Moving or Speaking So Slowly that Other People Could Have Noticed? Or the Opposite - Being So Fidgety 0-->not at all   Thoughts that You Would be Better Off Dead or of Hurting Yourself in Some Way 0-->not at all   PHQ-9: Brief Depression Severity Measure Score 0   If You Checked Off Any Problems, How Difficult Have These Problems Made It For You to Do Your Work, Take Care of Things at Home, or Get Along with Other People? not difficult at all       Health Habits and Functional and Cognitive Screenin/14/2023     2:00 PM   Functional & Cognitive Status   Do you have difficulty preparing food and eating? Yes   Do you have difficulty bathing yourself, getting dressed or grooming yourself? Yes   Do you have difficulty using the toilet? Yes   Do you have difficulty moving around from place to place? Yes   Do you have trouble with steps or getting out of a bed or a chair? Yes   Do you need help using the phone?  Yes   Are you deaf or do you have serious difficulty hearing?  No   Do you need help to go to places out of walking distance? Yes   Do you need help shopping? Yes   Do you need help preparing meals?  Yes   Do you need help with housework?  Yes   Do you need help with laundry? Yes   Do you need help taking your medications? Yes   Do you need help managing money? Yes   Do you ever drive or ride in a car without wearing a seat belt? No   Have you felt unusual stress, anger or loneliness in the last month? No   Who do you live with? Other   If you need help, do you have trouble finding someone available to you? No   Have you been bothered in the last four weeks by sexual problems? No   Do you have difficulty concentrating, remembering or making decisions? No       Age-appropriate Screening Schedule:  Refer to the list below for future screening recommendations based on patient's age, sex and/or medical conditions.  Orders for these recommended tests are listed in the plan section. The patient has been provided with a written plan.    Health Maintenance   Topic Date Due    LIPID PANEL  11/14/2023 (Originally 7/14/2023)    COVID-19 Vaccine (4 - 2023-24 season) 02/03/2024 (Originally 9/1/2023)    INFLUENZA VACCINE  03/31/2024 (Originally 8/1/2023)    TDAP/TD VACCINES (1 - Tdap) 06/23/2024 (Originally 1/16/1979)    ZOSTER VACCINE (1 of 2) 08/11/2024 (Originally 1/16/2010)    MAMMOGRAM  11/14/2024 (Originally 6/22/2018)    BMI FOLLOWUP  08/11/2024    ANNUAL WELLNESS VISIT  11/14/2024    COLORECTAL CANCER SCREENING  05/04/2028    HEPATITIS C SCREENING  Completed    Pneumococcal Vaccine 0-64  Aged Out                  CMS Preventative Services Quick Reference  Risk Factors Identified During Encounter  Fall Risk-High or Moderate: Discussed Fall Prevention in the home  The above risks/problems have been discussed with the patient.  Pertinent information has been shared with the patient in the After Visit Summary.  An After Visit Summary and PPPS were made available to the patient.    Follow Up:   Next Medicare Wellness visit to be scheduled in 1 year.       Additional E&M Note during same encounter follows:  Patient has multiple medical problems which are significant and separately identifiable that require additional work above and beyond the Medicare Wellness Visit.      Chief Complaint  Altered Mental Status, Leg Swelling, and Difficulty Urinating    Subjective        HPI  Gela Michaud is also being seen today for recent change in mental status, anxiety, dysuria and PTSD.     Review of Systems   HENT:  Negative for trouble swallowing.    Eyes:  Negative for visual disturbance.   Respiratory:  Negative for apnea.    Cardiovascular:  Negative for chest pain.   Gastrointestinal:  Negative for blood in stool.   Endocrine: Negative for polyphagia.   Genitourinary:  Negative for dysuria.   Skin:  Negative for color change.  "  Allergic/Immunologic: Negative for immunocompromised state.   Neurological:  Negative for seizures.   Hematological:  Negative for adenopathy.   Psychiatric/Behavioral:  Positive for agitation, behavioral problems and sleep disturbance. The patient is nervous/anxious.        Objective   Vital Signs:  /80   Pulse 77   Temp 97.8 °F (36.6 °C) (Tympanic)   Resp 18   Ht 165.1 cm (65\")   Wt 71.4 kg (157 lb 6.4 oz)   SpO2 97%   BMI 26.19 kg/m²     Physical Exam  Vitals reviewed.   Constitutional:       Appearance: She is well-developed and overweight.   HENT:      Head: Normocephalic and atraumatic.      Right Ear: External ear normal.      Left Ear: External ear normal.      Mouth/Throat:      Pharynx: No oropharyngeal exudate.   Eyes:      Conjunctiva/sclera: Conjunctivae normal.      Pupils: Pupils are equal, round, and reactive to light.   Neck:      Vascular: No carotid bruit.   Cardiovascular:      Rate and Rhythm: Normal rate and regular rhythm.      Heart sounds: No murmur heard.     No friction rub. No gallop.   Pulmonary:      Effort: Pulmonary effort is normal.      Breath sounds: Normal breath sounds. No wheezing or rhonchi.   Abdominal:      General: There is no distension.   Skin:     General: Skin is warm and dry.   Neurological:      Mental Status: She is alert and oriented to person, place, and time.      Cranial Nerves: No cranial nerve deficit.      Motor: No weakness.   Psychiatric:         Mood and Affect: Mood and affect normal.         Behavior: Behavior normal.         Thought Content: Thought content normal.         Judgment: Judgment normal.            CMP          8/6/2023    14:04 10/16/2023    15:36 11/3/2023    11:22   CMP   Glucose 136  105  105    BUN 7  7  21    Creatinine 1.09  1.10  1.82    EGFR 57.2  56.6  30.9    Sodium 143  133  133    Potassium 3.9  4.4  5.3    Chloride 106  96  97    Calcium 8.9  9.3  9.7    Total Protein 6.3  6.7  6.9    Albumin 3.6  4.0  4.0  "   Globulin 2.7  2.7  2.9    Total Bilirubin 0.2  0.2  0.2    Alkaline Phosphatase 84  105  74    AST (SGOT) 29  38  68    ALT (SGPT) 9  15  22    Albumin/Globulin Ratio 1.3  1.5  1.4    BUN/Creatinine Ratio 6.4  6.4  11.5    Anion Gap 12.3  9.9  14.0      CBC          8/6/2023    14:04 10/16/2023    15:36 11/3/2023    11:22   CBC   WBC 8.75  3.73  3.65    RBC 3.76  3.69  3.96    Hemoglobin 11.8  11.2  12.2    Hematocrit 35.8  35.0  36.0    MCV 95.2  94.9  90.9    MCH 31.4  30.4  30.8    MCHC 33.0  32.0  33.9    RDW 13.3  13.1  12.4    Platelets 176  199  236        TSH          8/6/2023    14:04   TSH   TSH 1.280                 Assessment and Plan   Diagnoses and all orders for this visit:    1. Medicare annual wellness visit, subsequent (Primary)    2. Essential hypertension  Assessment & Plan:  Hypertension is improving with treatment.  Continue current treatment regimen.  Dietary sodium restriction.  Weight loss.  Blood pressure will be reassessed at the next regular appointment.      3. Dysuria  -     Urine Culture - Urine, Urine, Clean Catch; Future  -     Urinalysis With Microscopic - Urine, Clean Catch; Future  -     Urine Culture - Urine, Urine, Clean Catch  -     Urinalysis With Microscopic - Urine, Clean Catch        -     POC Urinalysis Dipstick, Automated    4. Seizure disorder  Assessment & Plan:  She has not had any sure like activity since her last visit. Continue current medication.       5. Anxiety  -     prazosin (MINIPRESS) 1 MG capsule; Take 3 times per day for anxiety  Dispense: 90 capsule; Refill: 5    6. PTSD (post-traumatic stress disorder)  Assessment & Plan:  Psychological condition is worsening.  Medication changes per orders.  Psychological condition  will be reassessed at the next regular appointment.  -     prazosin (MINIPRESS) 1 MG capsule; Take 3 times per day for anxiety  Dispense: 90 capsule; Refill: 5         Follow Up   Return in about 6 months (around 5/14/2024).  Patient was  given instructions and counseling regarding her condition or for health maintenance advice. Please see specific information pulled into the AVS if appropriate.

## 2023-11-15 RX ORDER — NITROFURANTOIN 25; 75 MG/1; MG/1
100 CAPSULE ORAL 2 TIMES DAILY
Qty: 14 CAPSULE | Refills: 0 | Status: SHIPPED | OUTPATIENT
Start: 2023-11-15

## 2023-11-16 LAB — BACTERIA SPEC AEROBE CULT: ABNORMAL

## 2023-11-17 RX ORDER — POLYETHYLENE GLYCOL 3350 17 G/17G
17 POWDER, FOR SOLUTION ORAL DAILY
Qty: 90 EACH | Refills: 1 | Status: SHIPPED | OUTPATIENT
Start: 2023-11-17

## 2023-11-17 NOTE — TELEPHONE ENCOUNTER
Caller: NEETA HAND    Relationship: Emergency Contact    Best call back number: [phone]   Requested Prescriptions:   Requested Prescriptions     Pending Prescriptions Disp Refills    Vitamin A 3 MG (58607 UT) capsule       Sig: Take 1 capsule by mouth Daily.        Pharmacy where request should be sent: [facility], [place]. [place], [place] - [address] [facility]. - [phone]  - [phone] FX     Last office visit with prescribing clinician: 11/14/2023   Last telemedicine visit with prescribing clinician: Visit date not found   Next office visit with prescribing clinician: 12/8/2023     Additional details provided by patient: COMPLETELY OUT OF MEDICATION     Does the patient have less than a 3 day supply:  [x] Yes  [] No    Would you like a call back once the refill request has been completed: [x] Yes [] No    If the office needs to give you a call back, can they leave a voicemail: [x] Yes [] No    Kailash Burch Rep   11/17/23 13:18 EST

## 2023-11-20 ENCOUNTER — APPOINTMENT (OUTPATIENT)
Dept: CT IMAGING | Facility: HOSPITAL | Age: 63
End: 2023-11-20
Payer: MEDICARE

## 2023-11-20 ENCOUNTER — APPOINTMENT (OUTPATIENT)
Dept: GENERAL RADIOLOGY | Facility: HOSPITAL | Age: 63
End: 2023-11-20
Payer: MEDICARE

## 2023-11-20 ENCOUNTER — HOSPITAL ENCOUNTER (EMERGENCY)
Facility: HOSPITAL | Age: 63
Discharge: HOME | End: 2023-11-20
Attending: EMERGENCY MEDICINE | Admitting: EMERGENCY MEDICINE
Payer: MEDICARE

## 2023-11-20 VITALS
SYSTOLIC BLOOD PRESSURE: 139 MMHG | HEIGHT: 65 IN | RESPIRATION RATE: 16 BRPM | HEART RATE: 110 BPM | TEMPERATURE: 98.2 F | WEIGHT: 156.31 LBS | OXYGEN SATURATION: 94 % | BODY MASS INDEX: 26.04 KG/M2 | DIASTOLIC BLOOD PRESSURE: 90 MMHG

## 2023-11-20 DIAGNOSIS — R25.1 TREMOR OF UNKNOWN ORIGIN: Primary | ICD-10-CM

## 2023-11-20 LAB
ALBUMIN SERPL-MCNC: 4 G/DL (ref 3.5–5.2)
ALBUMIN/GLOB SERPL: 1.3 G/DL
ALP SERPL-CCNC: 99 U/L (ref 39–117)
ALT SERPL W P-5'-P-CCNC: 14 U/L (ref 1–33)
ANION GAP SERPL CALCULATED.3IONS-SCNC: 11.1 MMOL/L (ref 5–15)
AST SERPL-CCNC: 33 U/L (ref 1–32)
BASOPHILS # BLD AUTO: 0.05 10*3/MM3 (ref 0–0.2)
BASOPHILS NFR BLD AUTO: 0.6 % (ref 0–1.5)
BILIRUB SERPL-MCNC: 0.2 MG/DL (ref 0–1.2)
BILIRUB UR QL STRIP: NEGATIVE
BUN SERPL-MCNC: 11 MG/DL (ref 8–23)
BUN/CREAT SERPL: 10.6 (ref 7–25)
CALCIUM SPEC-SCNC: 9.4 MG/DL (ref 8.6–10.5)
CHLORIDE SERPL-SCNC: 97 MMOL/L (ref 98–107)
CLARITY UR: CLEAR
CO2 SERPL-SCNC: 24.9 MMOL/L (ref 22–29)
COLOR UR: YELLOW
CREAT SERPL-MCNC: 1.04 MG/DL (ref 0.57–1)
DEPRECATED RDW RBC AUTO: 44.2 FL (ref 37–54)
EGFRCR SERPLBLD CKD-EPI 2021: 60.5 ML/MIN/1.73
EOSINOPHIL # BLD AUTO: 0.29 10*3/MM3 (ref 0–0.4)
EOSINOPHIL NFR BLD AUTO: 3.2 % (ref 0.3–6.2)
ERYTHROCYTE [DISTWIDTH] IN BLOOD BY AUTOMATED COUNT: 13.2 % (ref 12.3–15.4)
GLOBULIN UR ELPH-MCNC: 3.2 GM/DL
GLUCOSE SERPL-MCNC: 106 MG/DL (ref 65–99)
GLUCOSE UR STRIP-MCNC: NEGATIVE MG/DL
HCT VFR BLD AUTO: 35.4 % (ref 34–46.6)
HGB BLD-MCNC: 11.5 G/DL (ref 12–15.9)
HGB UR QL STRIP.AUTO: NEGATIVE
HOLD SPECIMEN: NORMAL
HOLD SPECIMEN: NORMAL
IMM GRANULOCYTES # BLD AUTO: 0.02 10*3/MM3 (ref 0–0.05)
IMM GRANULOCYTES NFR BLD AUTO: 0.2 % (ref 0–0.5)
KETONES UR QL STRIP: NEGATIVE
LEUKOCYTE ESTERASE UR QL STRIP.AUTO: NEGATIVE
LYMPHOCYTES # BLD AUTO: 1.25 10*3/MM3 (ref 0.7–3.1)
LYMPHOCYTES NFR BLD AUTO: 13.8 % (ref 19.6–45.3)
MAGNESIUM SERPL-MCNC: 1.9 MG/DL (ref 1.6–2.4)
MCH RBC QN AUTO: 29.7 PG (ref 26.6–33)
MCHC RBC AUTO-ENTMCNC: 32.5 G/DL (ref 31.5–35.7)
MCV RBC AUTO: 91.5 FL (ref 79–97)
MONOCYTES # BLD AUTO: 0.52 10*3/MM3 (ref 0.1–0.9)
MONOCYTES NFR BLD AUTO: 5.8 % (ref 5–12)
NEUTROPHILS NFR BLD AUTO: 6.9 10*3/MM3 (ref 1.7–7)
NEUTROPHILS NFR BLD AUTO: 76.4 % (ref 42.7–76)
NITRITE UR QL STRIP: NEGATIVE
NRBC BLD AUTO-RTO: 0 /100 WBC (ref 0–0.2)
PH UR STRIP.AUTO: 5.5 [PH] (ref 5–8)
PLATELET # BLD AUTO: 299 10*3/MM3 (ref 140–450)
PMV BLD AUTO: 8.4 FL (ref 6–12)
POTASSIUM SERPL-SCNC: 4.5 MMOL/L (ref 3.5–5.2)
PROT SERPL-MCNC: 7.2 G/DL (ref 6–8.5)
PROT UR QL STRIP: NEGATIVE
RBC # BLD AUTO: 3.87 10*6/MM3 (ref 3.77–5.28)
SODIUM SERPL-SCNC: 133 MMOL/L (ref 136–145)
SP GR UR STRIP: 1.01 (ref 1–1.03)
TROPONIN T SERPL HS-MCNC: 16 NG/L
UROBILINOGEN UR QL STRIP: NORMAL
WBC NRBC COR # BLD AUTO: 9.03 10*3/MM3 (ref 3.4–10.8)
WHOLE BLOOD HOLD COAG: NORMAL
WHOLE BLOOD HOLD SPECIMEN: NORMAL

## 2023-11-20 PROCEDURE — 81003 URINALYSIS AUTO W/O SCOPE: CPT | Performed by: EMERGENCY MEDICINE

## 2023-11-20 PROCEDURE — 93005 ELECTROCARDIOGRAM TRACING: CPT

## 2023-11-20 PROCEDURE — 71045 X-RAY EXAM CHEST 1 VIEW: CPT

## 2023-11-20 PROCEDURE — 99284 EMERGENCY DEPT VISIT MOD MDM: CPT

## 2023-11-20 PROCEDURE — 70450 CT HEAD/BRAIN W/O DYE: CPT

## 2023-11-20 PROCEDURE — 36415 COLL VENOUS BLD VENIPUNCTURE: CPT

## 2023-11-20 PROCEDURE — 83735 ASSAY OF MAGNESIUM: CPT | Performed by: EMERGENCY MEDICINE

## 2023-11-20 PROCEDURE — 85025 COMPLETE CBC W/AUTO DIFF WBC: CPT | Performed by: EMERGENCY MEDICINE

## 2023-11-20 PROCEDURE — 93005 ELECTROCARDIOGRAM TRACING: CPT | Performed by: EMERGENCY MEDICINE

## 2023-11-20 PROCEDURE — 84484 ASSAY OF TROPONIN QUANT: CPT | Performed by: EMERGENCY MEDICINE

## 2023-11-20 PROCEDURE — 80053 COMPREHEN METABOLIC PANEL: CPT | Performed by: EMERGENCY MEDICINE

## 2023-11-20 RX ORDER — SODIUM CHLORIDE 0.9 % (FLUSH) 0.9 %
10 SYRINGE (ML) INJECTION AS NEEDED
Status: DISCONTINUED | OUTPATIENT
Start: 2023-11-20 | End: 2023-11-21 | Stop reason: HOSPADM

## 2023-11-20 NOTE — ED PROVIDER NOTES
"Time: 3:14 PM EST  Date of encounter:  11/20/2023  Independent Historian/Clinical History and Information was obtained by:   Patient    History is limited by: N/A    Chief complaint: Tremors versus possible seizures    History of Present Illness:  Patient is a 63 y.o. year old female who presents to the emergency department for evaluation of tremors versus possible seizure.  Patient has had tremors but it gotten much worse.  This has been going on for the last several months.  Patient lives with adult foster parents.  They report that patient has been having recent activity that they are concerned may represent  \"seizures\" with increased tremors and a change in altered mental status.  Patient has a history of seizures and takes Trileptal p.o. 300 mg daily.  Patient is followed by both neurology and mental health providers.  There is been numerous medicine changes over the last several months.    Patient Care Team  Primary Care Provider: Tonie Duran DO    Past Medical History:     Allergies   Allergen Reactions    Lorazepam Unknown - High Severity     Past Medical History:   Diagnosis Date    Abdominal bloating     Acid reflux     Anemia     Anxiety     Condition not found     Mental Retardation    Foot pain, bilateral     High blood pressure     High cholesterol     Hyperlipemia     Hypertension     Hypothyroidism     Ingrowing toenail     Mood disorder     Neurologic disorder     Seizure     Thyroid disorder     Thyroid trouble     Tinea unguium      Past Surgical History:   Procedure Laterality Date    ABDOMINAL HYSTERECTOMY      COLONOSCOPY  2018    ENDOSCOPY  2018     Family History   Problem Relation Age of Onset    No Known Problems Mother     No Known Problems Father        Home Medications:  Prior to Admission medications    Medication Sig Start Date End Date Taking? Authorizing Provider   atorvastatin (LIPITOR) 20 MG tablet TAKE ONE TABLET BY MOUTH EVERY NIGHT FOR CHOLESTEROL. 8/31/23   Tonie Duran DO "   Austedo 6 MG tablet  10/2/23   Keith Capone MD   Calcium Carb-Cholecalciferol (OYSCO 500 + D) 500-5 MG-MCG tablet per tablet Take 1 tablet by mouth Daily. 8/11/23   Tonie Duran DO   cholecalciferol (Vitamin D) 25 MCG (1000 UT) tablet Take 1 tablet by mouth Daily. 4/14/23   Tonie Duran DO   cycloSPORINE (RESTASIS) 0.05 % ophthalmic emulsion 1 drop 2 (Two) Times a Day.    Keith Capone MD   docusate sodium (COLACE) 100 MG capsule Take 1 capsule by mouth 2 (Two) Times a Day. 7/3/23   Tonie Duran DO   dorzolamide (TRUSOPT) 2 % ophthalmic solution 1 drop 2 (Two) Times a Day.    Keith Capone MD   folic acid (FOLVITE) 1 MG tablet Take 1 tablet by mouth Daily. 4/14/23   Tonie Duran DO   haloperidol (HALDOL) 10 MG tablet Take 1 tablet by mouth 2 (Two) Times a Day.    Keith Capone MD   levothyroxine (SYNTHROID, LEVOTHROID) 50 MCG tablet Take 1 tablet by mouth Daily. Take on empty stomach daily at 07:00 4/14/23   Tonie Duran DO   lisinopril (PRINIVIL,ZESTRIL) 5 MG tablet TAKE ONE TABLET BY MOUTH ONCE DAILY 8/31/23   Tonie Duran DO   memantine (NAMENDA) 5 MG tablet  10/12/23   Keith Capone MD   nitrofurantoin, macrocrystal-monohydrate, (Macrobid) 100 MG capsule Take 1 capsule by mouth 2 (Two) Times a Day. 11/15/23   Tonie Duran DO   omeprazole (priLOSEC) 20 MG capsule  10/16/23   Keith Capone MD   OXcarbazepine (TRILEPTAL) 300 MG tablet  9/27/23   Keith Capone MD   polyethylene glycol (MIRALAX) 17 g packet Take 17 g by mouth Daily. 11/17/23   Tonie Duran DO   prazosin (MINIPRESS) 1 MG capsule Take 3 times per day for anxiety 11/14/23   Tonie Duran DO   tamsulosin (FLOMAX) 0.4 MG capsule 24 hr capsule Take 1 capsule by mouth Daily. 8/15/23   Nishant Alexander MD   Vitamin A 3 MG (86380 UT) capsule Take 1 capsule by mouth Daily. 11/17/23   Tonie Duran DO   vitamin B-12 (CYANOCOBALAMIN) 1000 MCG tablet Take 1 tablet by mouth Daily.  "7/3/23   ReneeTonieDO        Social History:   Social History     Tobacco Use    Smoking status: Never     Passive exposure: Current    Smokeless tobacco: Never    Tobacco comments:     second hand smoke exposure-never   Vaping Use    Vaping Use: Never used   Substance Use Topics    Alcohol use: Never    Drug use: Never         Review of Systems:  Review of Systems   Unable to perform ROS: Psychiatric disorder (Intellectual disability)   Neurological:  Positive for tremors.        Physical Exam:  /90   Pulse 110   Temp 98.2 °F (36.8 °C) (Oral)   Resp 16   Ht 165.1 cm (65\")   Wt 70.9 kg (156 lb 4.9 oz)   SpO2 94%   BMI 26.01 kg/m²   Vital signs were reviewed under triage note.  General appearance - Patient appears well-developed and well-nourished.  Patient is in no acute distress.  Head - Normocephalic, atraumatic.  Pupils - Equal, round, reactive to light.  Extraocular muscles are intact.  Conjunctiva is clear.  Nasal - Normal inspection.  No evidence of trauma or epistaxis.  Tympanic membranes - Gray, intact without erythema or retractions.  Oral mucosa - Pink and moist without lesions or erythema.  Uvula is midline.  Chest wall - Atraumatic.  Chest wall is nontender.  There are no vesicular rashes noted.  Neck - Supple.  Trachea was midline.  There is no palpable lymphadenopathy or thyromegaly.  There are no meningeal signs  Lungs - Clear to auscultation and percussion bilaterally.  Heart - Regular rate and rhythm without any murmurs, clicks, or gallops.  Abdomen - Soft.  Bowel sounds are present.  There is no palpable tenderness.  There is no rebound, guarding, or rigidity.  There are no palpable masses.  There are no pulsatile masses.  Back - Spine is straight and midline.  There is no CVA tenderness.  Extremities - Intact x4 with full range of motion.  There is no palpable edema.  Pulses are intact x4 and equal.  Neurologic - Patient is awake and alert.  Patient will repeat some words.  " Cranial nerves II through XII are grossly intact.  Motor function is grossly intact as patient moves all 4 extremities purposefully.  Patient has a pretty consistent tremor at rest.  There is no pill-rolling noted.  There is no cogwheel rigidity noted.  Integument - There are no rashes.  There are no petechia or purpura lesions noted.  There are no vesicular lesions noted.            Procedures:  Procedures      Medical Decision Making:      Comorbidities that affect care:    Anemia, hypertension, hyperlipidemia, hypothyroidism, mood disorder, neurologic disorder, history of seizures    External Notes reviewed:    Previous Clinic Note: Office visit with Dr. Duran dated 11/14/2023 was reviewed by me.      The following orders were placed and all results were independently analyzed by me:  Orders Placed This Encounter   Procedures    XR Chest 1 View    CT Head Without Contrast    [place] Draw    Comprehensive Metabolic Panel    Single High Sensitivity Troponin T    Magnesium    Urinalysis With Microscopic If Indicated (No Culture) - Urine, Clean Catch    CBC Auto Differential    Undress & Gown    Continuous Pulse Oximetry    Vital Signs    ECG 12 Lead ED Triage Standing Order; Weak / Dizzy / AMS    CBC & Differential    Green Top (Gel)    Lavender Top    Gold Top - SST    Light Blue Top       Medications Given in the Emergency Department:  Medications - No data to display       ED Course:    The patient was initially evaluated in the triage area where orders were placed. The patient was later dispositioned by Jeffery Booth DO.      The patient was advised to stay for completion of workup which includes but is not limited to communication of labs and radiological results, reassessment and plan. The patient was advised that leaving prior to disposition by a provider could result in critical findings that are not communicated to the patient.     ED Course as of 11/22/23 1903   Mon Nov 20, 2023   2152 EKG performed at 1529  is nondiagnostic.  There is extensive mount of artifact due to patient unable to be still.  Multiple attempts were taken at trying to perform the EKG but were unsuccessful. [TB]      ED Course User Index  [TB] Jeffery Booth DO     The patient was seen and evaluated in the ED by me.  The above history and physical examination was performed as documented.  Diagnostic data was obtained.  Results reviewed.  There is no acute emergent medical findings.  Patient's symptoms have a potential to be medication induced versus an underlying neurological condition.  Patient is not having active seizures.  Do not feel patient warrants acute hospitalization and in fact I feel she be best treated by her treating medical providers versus inpatient with new medical providers.  Patient may need some medication holiday but again this will be complicated and will take some time.  I discussed all this with the patient's adult foster care provider.  Patient stable for discharge home.    Labs:    Lab Results (last 24 hours)       ** No results found for the last 24 hours. **             Imaging:    No Radiology Exams Resulted Within Past 24 Hours      Differential Diagnosis and Discussion:      Tremors versus medication induced versus neurological condition    All labs were reviewed and interpreted by me.  All X-rays impressions were independently interpreted by me.  EKG was interpreted by me.  CT scan radiology impression was interpreted by me.    MDM     Amount and/or Complexity of Data Reviewed  Clinical lab tests: reviewed  Tests in the radiology section of CPT®: reviewed  Tests in the medicine section of CPT®: reviewed                 Patient Care Considerations:    SEPSIS was considered but is NOT present in the emergency department as SIRS criteria is not present.      Consultants/Shared Management Plan:    None    Social Determinants of Health:    Patient has presented with family members who are responsible, reliable and will ensure  follow up care.      Disposition and Care Coordination:    Discharged: I considered escalation of care by admitting this patient for observation, however the patient has improved and is suitable and  stable for discharge.    I have explained the patient´s condition, diagnoses and treatment plan based on the information available to me at this time. I have answered questions and addressed any concerns. The patient has a good  understanding of the patient´s diagnosis, condition, and treatment plan as can be expected at this point. The vital signs have been stable. The patient´s condition is stable and appropriate for discharge from the emergency department.      The patient will pursue further outpatient evaluation with the primary care physician or other designated or consulting physician as outlined in the discharge instructions. They are agreeable to this plan of care and follow-up instructions have been explained in detail. The patient has received these instructions in written format and have expressed an understanding of the discharge instructions. The patient is aware that any significant change in condition or worsening of symptoms should prompt an immediate return to this or the closest emergency department or call to 911.    Final diagnoses:   Tremor of unknown origin        ED Disposition       ED Disposition   Discharge    Condition   Stable    Comment   --               This medical record created using voice recognition software.             Jeffery Booth DO  11/22/23 [id number]

## 2023-11-21 NOTE — DISCHARGE INSTRUCTIONS
Arrange for outpatient follow-up with all your specialist to discuss the patient's symptoms and to see if any changes can be made.  Continue on home medications until seen by your specialist.  Return to the ER for any other concerns issues that may arise.

## 2023-11-27 LAB
QT INTERVAL: 384 MS
QTC INTERVAL: 526 MS

## 2023-12-08 ENCOUNTER — OFFICE VISIT (OUTPATIENT)
Dept: FAMILY MEDICINE CLINIC | Facility: CLINIC | Age: 63
End: 2023-12-08
Payer: MEDICARE

## 2023-12-08 VITALS
OXYGEN SATURATION: 98 % | WEIGHT: 157.9 LBS | SYSTOLIC BLOOD PRESSURE: 103 MMHG | HEIGHT: 65 IN | DIASTOLIC BLOOD PRESSURE: 66 MMHG | RESPIRATION RATE: 18 BRPM | TEMPERATURE: 98.2 F | BODY MASS INDEX: 26.31 KG/M2 | HEART RATE: 76 BPM

## 2023-12-08 DIAGNOSIS — I10 ESSENTIAL HYPERTENSION: Primary | Chronic | ICD-10-CM

## 2023-12-08 DIAGNOSIS — G43.909 MIGRAINE WITHOUT STATUS MIGRAINOSUS, NOT INTRACTABLE, UNSPECIFIED MIGRAINE TYPE: Chronic | ICD-10-CM

## 2023-12-08 DIAGNOSIS — F43.10 PTSD (POST-TRAUMATIC STRESS DISORDER): Chronic | ICD-10-CM

## 2023-12-08 DIAGNOSIS — E53.8 B12 DEFICIENCY: ICD-10-CM

## 2023-12-08 DIAGNOSIS — E03.9 ACQUIRED HYPOTHYROIDISM: Chronic | ICD-10-CM

## 2023-12-08 DIAGNOSIS — Z00.00 ANNUAL PHYSICAL EXAM: ICD-10-CM

## 2023-12-08 RX ORDER — QUETIAPINE FUMARATE 100 MG/1
TABLET, FILM COATED ORAL
COMMUNITY
Start: 2023-11-27

## 2023-12-08 RX ORDER — HYDROXYZINE PAMOATE 25 MG/1
CAPSULE ORAL
COMMUNITY
Start: 2023-11-28

## 2023-12-08 RX ORDER — BENZTROPINE MESYLATE 2 MG/1
TABLET ORAL
COMMUNITY
Start: 2023-11-28

## 2023-12-08 NOTE — ASSESSMENT & PLAN NOTE
Her most recent thyroid level was normal. We will repeat her thyroid level in 6 months and manage according to findings.

## 2023-12-08 NOTE — PROGRESS NOTES
"Chief Complaint  Follow-up (4 month follow up )    Subjective        Gela Michaud presents to [facility]  History of Present Illness  She presents today for a follow-up for the management of her chronic medical conditions. She is accompanied by Yas her caregiver. The patient has MMR. She also has a past medical history significant for anemia, anxiety, hyperlipidemia, hyperthyroidism, mood disorder and history of seizures.     She has been with her current caregiver for 8 years.      She is feeling better now that she is on prazosin nightly. Her night times are restful and she is sleeping in her own stand.      The patient has no other complaints today and denies chest pain, shortness of breath, weakness, numbness, nausea, vomiting, diarrhea, dizziness or syncopal event.        Objective   Vital Signs:  /66 (BP Location: Left arm, Patient Position: Sitting, Cuff Size: Adult)   Pulse 76   Temp 98.2 °F (36.8 °C) (Tympanic)   Resp 18   Ht 165.1 cm (65\")   Wt 71.6 kg (157 lb 14.4 oz)   SpO2 98%   BMI 26.28 kg/m²   Estimated body mass index is 26.28 kg/m² as calculated from the following:    Height as of this encounter: 165.1 cm (65\").    Weight as of this encounter: 71.6 kg (157 lb 14.4 oz).               Physical Exam  Vitals reviewed.   Constitutional:       Appearance: She is well-developed and overweight.   HENT:      Head: Normocephalic and atraumatic.      Right Ear: External ear normal.      Left Ear: External ear normal.      Mouth/Throat:      Pharynx: No oropharyngeal exudate.   Eyes:      Conjunctiva/sclera: Conjunctivae normal.      Pupils: Pupils are equal, round, and reactive to light.   Neck:      Vascular: No carotid bruit.   Cardiovascular:      Rate and Rhythm: Normal rate and regular rhythm.      Heart sounds: No murmur heard.     No friction rub. No gallop.   Pulmonary:      Effort: Pulmonary effort is normal.      Breath sounds: Normal breath " sounds. No wheezing or rhonchi.   Abdominal:      General: There is no distension.   Skin:     General: Skin is warm and dry.   Neurological:      Mental Status: She is alert and oriented to person, place, and time.      Cranial Nerves: No cranial nerve deficit.      Motor: No weakness.   Psychiatric:         Mood and Affect: Mood and affect normal.         Behavior: Behavior normal.         Thought Content: Thought content normal.         Judgment: Judgment normal.        Result Review :    CMP          11/3/2023    11:22 11/14/2023    15:56 11/20/2023    17:55   CMP   Glucose 105  109  106    BUN 21  15  11    Creatinine 1.82  1.12  1.04    EGFR 30.9  55.4  60.5    Sodium 133  133  133    Potassium 5.3  4.8  4.5    Chloride 97  98  97    Calcium 9.7  9.2  9.4    Total Protein 6.9  6.8  7.2    Albumin 4.0  3.8  4.0    Globulin 2.9  3.0  3.2    Total Bilirubin 0.2  0.2  0.2    Alkaline Phosphatase 74  86  99    AST (SGOT) 68  32  33    ALT (SGPT) 22  15  14    Albumin/Globulin Ratio 1.4  1.3  1.3    BUN/Creatinine Ratio 11.5  13.4  10.6    Anion Gap 14.0  8.3  11.1      CBC          11/3/2023    11:22 11/14/2023    15:56 11/20/2023    17:55   CBC   WBC 3.65  5.68  9.03    RBC 3.96  3.49  3.87    Hemoglobin 12.2  10.5  11.5    Hematocrit 36.0  32.2  35.4    MCV 90.9  92.3  91.5    MCH 30.8  30.1  29.7    MCHC 33.9  32.6  32.5    RDW 12.4  13.1  13.2    Platelets 236  217  299        TSH          8/6/2023    14:04   TSH   TSH 1.280                   Assessment and Plan   Diagnoses and all orders for this visit:    1. Essential hypertension (Primary)  Assessment & Plan:  Hypertension is improving with treatment.  Continue current treatment regimen.  Dietary sodium restriction.  Weight loss.  Blood pressure will be reassessed at the next regular appointment.    Orders:  -     Comprehensive metabolic panel; Future    2. Migraine without status migrainosus, not intractable, unspecified migraine type  Assessment &  Plan:  Headaches are improving with treatment.  Continue current treatment regimen.          3. Acquired hypothyroidism  Assessment & Plan:  Her most recent thyroid level was normal. We will repeat her thyroid level in 6 months and manage according to findings.     Orders:  -     TSH+Free T4; Future    4. PTSD (post-traumatic stress disorder)  Assessment & Plan:  Psychological condition is improving with treatment.  Continue current treatment regimen.  Psychological condition  will be reassessed at the next regular appointment.      5. B12 deficiency  -     Vitamin B12; Future  -     Folate; Future    6. Annual physical exam  -     CBC w AUTO Differential; Future  -     Magnesium; Future  -     Phosphorus; Future             Follow Up   Return in about 6 months (around 6/8/2024).  Patient was given instructions and counseling regarding her condition or for health maintenance advice. Please see specific information pulled into the AVS if appropriate.         Answers submitted by the patient for this visit:  Other (Submitted on 12/1/2023)  Please describe your symptoms.: Follow up on medications. Need a medication increase on prazosin.  Have you had these symptoms before?: No  How long have you been having these symptoms?: 1-4 days  Please list any medications you are currently taking for this condition.: Will bring list  Please describe any probable cause for these symptoms. : Talking alot about the past when she was young causing her to become upset at times.  Primary Reason for Visit (Submitted on 12/1/2023)  What is the primary reason for your visit?: Other

## 2023-12-11 ENCOUNTER — PATIENT ROUNDING (BHMG ONLY) (OUTPATIENT)
Dept: FAMILY MEDICINE CLINIC | Facility: CLINIC | Age: 63
End: 2023-12-11
Payer: MEDICARE

## 2023-12-11 NOTE — PROGRESS NOTES
A My-Chart message has been sent to the patient for [place] [facility]

## 2024-01-23 RX ORDER — SULFAMETHOXAZOLE AND TRIMETHOPRIM 800; 160 MG/1; MG/1
1 TABLET ORAL 2 TIMES DAILY
Qty: 14 TABLET | Refills: 0 | Status: SHIPPED | OUTPATIENT
Start: 2024-01-23

## 2024-02-09 DIAGNOSIS — I10 ESSENTIAL HYPERTENSION: Chronic | ICD-10-CM

## 2024-02-09 DIAGNOSIS — E78.00 HYPERCHOLESTEREMIA: ICD-10-CM

## 2024-02-09 DIAGNOSIS — E03.9 ACQUIRED HYPOTHYROIDISM: ICD-10-CM

## 2024-02-09 RX ORDER — CALCIUM CARBONATE/VITAMIN D3 500MG-5MCG
1 TABLET ORAL DAILY
Qty: 90 TABLET | Refills: 1 | Status: SHIPPED | OUTPATIENT
Start: 2024-02-09

## 2024-02-29 DIAGNOSIS — F41.9 ANXIETY: ICD-10-CM

## 2024-02-29 DIAGNOSIS — E78.00 HYPERCHOLESTEREMIA: ICD-10-CM

## 2024-02-29 DIAGNOSIS — I10 ESSENTIAL HYPERTENSION: Chronic | ICD-10-CM

## 2024-02-29 DIAGNOSIS — E61.1 IRON DEFICIENCY: Primary | ICD-10-CM

## 2024-02-29 DIAGNOSIS — E03.9 ACQUIRED HYPOTHYROIDISM: ICD-10-CM

## 2024-02-29 RX ORDER — PRAZOSIN HYDROCHLORIDE 1 MG/1
CAPSULE ORAL
Qty: 90 CAPSULE | Refills: 5 | Status: SHIPPED | OUTPATIENT
Start: 2024-02-29

## 2024-02-29 RX ORDER — CALCIUM CARBONATE/VITAMIN D3 500MG-5MCG
1 TABLET ORAL DAILY
Qty: 90 TABLET | Refills: 1 | Status: SHIPPED | OUTPATIENT
Start: 2024-02-29

## 2024-02-29 RX ORDER — FERROUS GLUCONATE 324(38)MG
324 TABLET ORAL DAILY
Qty: 90 TABLET | Status: CANCELLED | OUTPATIENT
Start: 2024-02-29

## 2024-02-29 RX ORDER — FOLIC ACID 1 MG/1
1000 TABLET ORAL DAILY
Qty: 90 TABLET | Refills: 5 | Status: SHIPPED | OUTPATIENT
Start: 2024-02-29

## 2024-02-29 NOTE — TELEPHONE ENCOUNTER
Pharmacy requesting med refills.  Ferrous gluconate was not on active med list.   Patient last seen 12/8/23.

## 2024-03-26 DIAGNOSIS — E78.00 HYPERCHOLESTEREMIA: ICD-10-CM

## 2024-03-26 DIAGNOSIS — E03.9 ACQUIRED HYPOTHYROIDISM: ICD-10-CM

## 2024-03-26 DIAGNOSIS — I10 ESSENTIAL HYPERTENSION: ICD-10-CM

## 2024-03-26 RX ORDER — LEVOTHYROXINE SODIUM 0.05 MG/1
50 TABLET ORAL DAILY
Qty: 90 TABLET | Refills: 3 | Status: SHIPPED | OUTPATIENT
Start: 2024-03-26

## 2024-03-26 RX ORDER — MELATONIN
1000 DAILY
Qty: 90 TABLET | Refills: 3 | Status: SHIPPED | OUTPATIENT
Start: 2024-03-26

## 2024-03-26 RX ORDER — FERROUS GLUCONATE 324(38)MG
324 TABLET ORAL
Qty: 90 TABLET | Refills: 3 | Status: SHIPPED | OUTPATIENT
Start: 2024-03-26

## 2024-03-26 RX ORDER — SENNOSIDES A AND B 8.6 MG/1
1 TABLET, FILM COATED ORAL 2 TIMES DAILY
Qty: 180 TABLET | Refills: 3 | Status: SHIPPED | OUTPATIENT
Start: 2024-03-26

## 2024-03-26 RX ORDER — OMEPRAZOLE 20 MG/1
20 CAPSULE, DELAYED RELEASE ORAL DAILY
Qty: 90 CAPSULE | Refills: 3 | Status: SHIPPED | OUTPATIENT
Start: 2024-03-26

## 2024-03-26 NOTE — TELEPHONE ENCOUNTER
Pharmacy requesting refills for patient.  Last seen 12/8/23.  Next appt 6/7/24.  Senna and ferrous gluconate were not on active med list.

## 2024-04-04 DIAGNOSIS — F41.9 ANXIETY: ICD-10-CM

## 2024-04-04 RX ORDER — PRAZOSIN HYDROCHLORIDE 1 MG/1
CAPSULE ORAL
Qty: 150 CAPSULE | Refills: 5 | Status: SHIPPED | OUTPATIENT
Start: 2024-04-04

## 2024-04-04 RX ORDER — SULFAMETHOXAZOLE AND TRIMETHOPRIM 800; 160 MG/1; MG/1
1 TABLET ORAL 2 TIMES DAILY
Qty: 14 TABLET | Refills: 0 | Status: SHIPPED | OUTPATIENT
Start: 2024-04-04

## 2024-04-25 ENCOUNTER — TELEPHONE (OUTPATIENT)
Dept: UROLOGY | Facility: CLINIC | Age: 64
End: 2024-04-25

## 2024-05-01 NOTE — TELEPHONE ENCOUNTER
3RD CALL TO PT TO RS NO SHOW APPT W/JATINDER/ОЛЕГ/THIS IS A TRIPP PT/ANYTHING ELSE TO DO??

## 2024-05-14 RX ORDER — DOCUSATE SODIUM 100 MG/1
100 CAPSULE, LIQUID FILLED ORAL 2 TIMES DAILY
Qty: 180 CAPSULE | Refills: 3 | Status: SHIPPED | OUTPATIENT
Start: 2024-05-14

## 2024-06-05 RX ORDER — DORZOLAMIDE HCL 20 MG/ML
1 SOLUTION/ DROPS OPHTHALMIC 2 TIMES DAILY
Qty: 10 ML | Refills: 11 | Status: SHIPPED | OUTPATIENT
Start: 2024-06-05

## 2024-06-05 RX ORDER — QUETIAPINE FUMARATE 300 MG/1
300 TABLET, FILM COATED ORAL NIGHTLY
Qty: 30 TABLET | Refills: 11 | Status: SHIPPED | OUTPATIENT
Start: 2024-06-05

## 2024-06-05 RX ORDER — QUETIAPINE FUMARATE 150 MG/1
150 TABLET, FILM COATED ORAL
Qty: 30 TABLET | Refills: 11 | Status: SHIPPED | OUTPATIENT
Start: 2024-06-05

## 2024-06-05 RX ORDER — LACTOSE-REDUCED FOOD
1 LIQUID (ML) ORAL 3 TIMES DAILY
Qty: 90 EACH | Refills: 5 | Status: SHIPPED | OUTPATIENT
Start: 2024-06-05 | End: 2024-06-06 | Stop reason: SDUPTHER

## 2024-06-05 NOTE — TELEPHONE ENCOUNTER
Caller: NEETA HAND    Relationship: Emergency Contact    Best call back number: [phone]    Requested Prescriptions:   Requested Prescriptions     Pending Prescriptions Disp Refills    dorzolamide (TRUSOPT) 2 % ophthalmic solution       Si drop 2 (Two) Times a Day.    QUEtiapine (SEROquel) 100 MG tablet          Pharmacy where request should be sent: [facility] [address] [facility] [phone] [facility] [phone] FX     Last office visit with prescribing clinician: 2023   Last telemedicine visit with prescribing clinician: Visit date not found   Next office visit with prescribing clinician: 2024     Does the patient have less than a 3 day supply:  [x] Yes  [] No    Kailash Bateman Rep   24 09:49 EDT

## 2024-06-05 NOTE — TELEPHONE ENCOUNTER
Caller: NEETA HAND    Relationship: Emergency Contact    Best call back number: [phone]     What medication are you requesting: PAPER PRESCRIPTION FOR BOOST - TAKEN 3 TIMES DAILY    Additional notes:    NEETA STATES TO PLEASE CALL HER WHEN THE PRESCRIPTION IS READY FOR .

## 2024-06-06 DIAGNOSIS — E46 PROTEIN MALNUTRITION: Primary | ICD-10-CM

## 2024-06-06 RX ORDER — LACTOSE-REDUCED FOOD
1 LIQUID (ML) ORAL 3 TIMES DAILY
Qty: 90 EACH | Refills: 5 | Status: SHIPPED | OUTPATIENT
Start: 2024-06-06

## 2024-06-07 ENCOUNTER — LAB (OUTPATIENT)
Dept: LAB | Facility: HOSPITAL | Age: 64
End: 2024-06-07
Payer: MEDICARE

## 2024-06-07 ENCOUNTER — OFFICE VISIT (OUTPATIENT)
Dept: FAMILY MEDICINE CLINIC | Facility: CLINIC | Age: 64
End: 2024-06-07
Payer: MEDICARE

## 2024-06-07 VITALS
OXYGEN SATURATION: 97 % | WEIGHT: 132.6 LBS | SYSTOLIC BLOOD PRESSURE: 109 MMHG | HEART RATE: 88 BPM | BODY MASS INDEX: 22.09 KG/M2 | TEMPERATURE: 98 F | DIASTOLIC BLOOD PRESSURE: 76 MMHG | RESPIRATION RATE: 8 BRPM | HEIGHT: 65 IN

## 2024-06-07 DIAGNOSIS — E03.9 ACQUIRED HYPOTHYROIDISM: Chronic | ICD-10-CM

## 2024-06-07 DIAGNOSIS — E61.1 IRON DEFICIENCY: ICD-10-CM

## 2024-06-07 DIAGNOSIS — E78.00 HYPERCHOLESTEREMIA: Chronic | ICD-10-CM

## 2024-06-07 DIAGNOSIS — E53.8 B12 DEFICIENCY: ICD-10-CM

## 2024-06-07 DIAGNOSIS — R30.9 PAINFUL URINATION: ICD-10-CM

## 2024-06-07 DIAGNOSIS — N30.01 ACUTE CYSTITIS WITH HEMATURIA: Primary | ICD-10-CM

## 2024-06-07 DIAGNOSIS — I10 ESSENTIAL HYPERTENSION: Chronic | ICD-10-CM

## 2024-06-07 DIAGNOSIS — Z00.00 ANNUAL PHYSICAL EXAM: ICD-10-CM

## 2024-06-07 DIAGNOSIS — R63.4 UNINTENDED WEIGHT LOSS: ICD-10-CM

## 2024-06-07 LAB
ALBUMIN SERPL-MCNC: 4.3 G/DL (ref 3.5–5.2)
ALBUMIN/GLOB SERPL: 1.4 G/DL
ALP SERPL-CCNC: 128 U/L (ref 39–117)
ALT SERPL W P-5'-P-CCNC: 14 U/L (ref 1–33)
ANION GAP SERPL CALCULATED.3IONS-SCNC: 15.2 MMOL/L (ref 5–15)
AST SERPL-CCNC: 31 U/L (ref 1–32)
BACTERIA UR QL AUTO: ABNORMAL /HPF
BASOPHILS # BLD AUTO: 0.03 10*3/MM3 (ref 0–0.2)
BASOPHILS NFR BLD AUTO: 0.4 % (ref 0–1.5)
BILIRUB BLD-MCNC: NEGATIVE MG/DL
BILIRUB SERPL-MCNC: 0.3 MG/DL (ref 0–1.2)
BILIRUB UR QL STRIP: NEGATIVE
BUN SERPL-MCNC: 13 MG/DL (ref 8–23)
BUN/CREAT SERPL: 11.7 (ref 7–25)
CALCIUM SPEC-SCNC: 9.4 MG/DL (ref 8.6–10.5)
CHLORIDE SERPL-SCNC: 91 MMOL/L (ref 98–107)
CHOLEST SERPL-MCNC: 174 MG/DL (ref 0–200)
CLARITY UR: ABNORMAL
CLARITY, POC: ABNORMAL
CO2 SERPL-SCNC: 22.8 MMOL/L (ref 22–29)
COD CRY URNS QL: PRESENT /HPF
COLOR UR: ABNORMAL
COLOR UR: ABNORMAL
CREAT SERPL-MCNC: 1.11 MG/DL (ref 0.57–1)
DEPRECATED RDW RBC AUTO: 41.3 FL (ref 37–54)
EGFRCR SERPLBLD CKD-EPI 2021: 55.6 ML/MIN/1.73
EOSINOPHIL # BLD AUTO: 0.29 10*3/MM3 (ref 0–0.4)
EOSINOPHIL NFR BLD AUTO: 3.6 % (ref 0.3–6.2)
ERYTHROCYTE [DISTWIDTH] IN BLOOD BY AUTOMATED COUNT: 12.6 % (ref 12.3–15.4)
EXPIRATION DATE: ABNORMAL
FERRITIN SERPL-MCNC: 140 NG/ML (ref 13–150)
FOLATE SERPL-MCNC: >20 NG/ML (ref 4.78–24.2)
GLOBULIN UR ELPH-MCNC: 3.1 GM/DL
GLUCOSE SERPL-MCNC: 110 MG/DL (ref 65–99)
GLUCOSE UR STRIP-MCNC: NEGATIVE MG/DL
GLUCOSE UR STRIP-MCNC: NEGATIVE MG/DL
HCT VFR BLD AUTO: 37.8 % (ref 34–46.6)
HDLC SERPL-MCNC: 78 MG/DL (ref 40–60)
HGB BLD-MCNC: 12.6 G/DL (ref 12–15.9)
HGB UR QL STRIP.AUTO: NEGATIVE
HYALINE CASTS UR QL AUTO: ABNORMAL /LPF
IMM GRANULOCYTES # BLD AUTO: 0.03 10*3/MM3 (ref 0–0.05)
IMM GRANULOCYTES NFR BLD AUTO: 0.4 % (ref 0–0.5)
IRON 24H UR-MRATE: 82 MCG/DL (ref 37–145)
IRON SATN MFR SERPL: 26 % (ref 20–50)
KETONES UR QL STRIP: ABNORMAL
KETONES UR QL: ABNORMAL
LDLC SERPL CALC-MCNC: 84 MG/DL (ref 0–100)
LDLC/HDLC SERPL: 1.07 {RATIO}
LEUKOCYTE EST, POC: ABNORMAL
LEUKOCYTE ESTERASE UR QL STRIP.AUTO: ABNORMAL
LYMPHOCYTES # BLD AUTO: 0.98 10*3/MM3 (ref 0.7–3.1)
LYMPHOCYTES NFR BLD AUTO: 12.3 % (ref 19.6–45.3)
Lab: ABNORMAL
MAGNESIUM SERPL-MCNC: 1.7 MG/DL (ref 1.6–2.4)
MCH RBC QN AUTO: 30.1 PG (ref 26.6–33)
MCHC RBC AUTO-ENTMCNC: 33.3 G/DL (ref 31.5–35.7)
MCV RBC AUTO: 90.2 FL (ref 79–97)
MONOCYTES # BLD AUTO: 0.51 10*3/MM3 (ref 0.1–0.9)
MONOCYTES NFR BLD AUTO: 6.4 % (ref 5–12)
NEUTROPHILS NFR BLD AUTO: 6.12 10*3/MM3 (ref 1.7–7)
NEUTROPHILS NFR BLD AUTO: 76.9 % (ref 42.7–76)
NITRITE UR QL STRIP: POSITIVE
NITRITE UR-MCNC: POSITIVE MG/ML
NRBC BLD AUTO-RTO: 0 /100 WBC (ref 0–0.2)
PH UR STRIP.AUTO: 5.5 [PH] (ref 5–8)
PH UR: 5.5 [PH] (ref 5–8)
PHOSPHATE SERPL-MCNC: 2.7 MG/DL (ref 2.5–4.5)
PLATELET # BLD AUTO: 186 10*3/MM3 (ref 140–450)
PMV BLD AUTO: 11.2 FL (ref 6–12)
POTASSIUM SERPL-SCNC: 3.9 MMOL/L (ref 3.5–5.2)
PROT SERPL-MCNC: 7.4 G/DL (ref 6–8.5)
PROT UR QL STRIP: ABNORMAL
PROT UR STRIP-MCNC: ABNORMAL MG/DL
RBC # BLD AUTO: 4.19 10*6/MM3 (ref 3.77–5.28)
RBC # UR STRIP: ABNORMAL /HPF
RBC # UR STRIP: ABNORMAL /UL
REF LAB TEST METHOD: ABNORMAL
SODIUM SERPL-SCNC: 129 MMOL/L (ref 136–145)
SP GR UR STRIP: 1.02 (ref 1–1.03)
SP GR UR: 1.02 (ref 1–1.03)
SQUAMOUS #/AREA URNS HPF: ABNORMAL /HPF
T4 FREE SERPL-MCNC: 0.77 NG/DL (ref 0.92–1.68)
TIBC SERPL-MCNC: 314 MCG/DL (ref 298–536)
TRANSFERRIN SERPL-MCNC: 211 MG/DL (ref 200–360)
TRIGL SERPL-MCNC: 61 MG/DL (ref 0–150)
TSH SERPL DL<=0.05 MIU/L-ACNC: 1.49 UIU/ML (ref 0.27–4.2)
UROBILINOGEN UR QL STRIP: ABNORMAL
UROBILINOGEN UR QL: NORMAL
VIT B12 BLD-MCNC: 1814 PG/ML (ref 211–946)
VLDLC SERPL-MCNC: 12 MG/DL (ref 5–40)
WBC # UR STRIP: ABNORMAL /HPF
WBC CLUMPS # UR AUTO: ABNORMAL /HPF
WBC NRBC COR # BLD AUTO: 7.96 10*3/MM3 (ref 3.4–10.8)

## 2024-06-07 PROCEDURE — 87086 URINE CULTURE/COLONY COUNT: CPT | Performed by: FAMILY MEDICINE

## 2024-06-07 PROCEDURE — 3074F SYST BP LT 130 MM HG: CPT | Performed by: FAMILY MEDICINE

## 2024-06-07 PROCEDURE — 81003 URINALYSIS AUTO W/O SCOPE: CPT | Performed by: FAMILY MEDICINE

## 2024-06-07 PROCEDURE — 3078F DIAST BP <80 MM HG: CPT | Performed by: FAMILY MEDICINE

## 2024-06-07 PROCEDURE — G2211 COMPLEX E/M VISIT ADD ON: HCPCS | Performed by: FAMILY MEDICINE

## 2024-06-07 PROCEDURE — 82728 ASSAY OF FERRITIN: CPT

## 2024-06-07 PROCEDURE — 84100 ASSAY OF PHOSPHORUS: CPT

## 2024-06-07 PROCEDURE — 85025 COMPLETE CBC W/AUTO DIFF WBC: CPT

## 2024-06-07 PROCEDURE — 84466 ASSAY OF TRANSFERRIN: CPT

## 2024-06-07 PROCEDURE — 84439 ASSAY OF FREE THYROXINE: CPT

## 2024-06-07 PROCEDURE — 1126F AMNT PAIN NOTED NONE PRSNT: CPT | Performed by: FAMILY MEDICINE

## 2024-06-07 PROCEDURE — 87186 SC STD MICRODIL/AGAR DIL: CPT | Performed by: FAMILY MEDICINE

## 2024-06-07 PROCEDURE — 82607 VITAMIN B-12: CPT

## 2024-06-07 PROCEDURE — 81001 URINALYSIS AUTO W/SCOPE: CPT | Performed by: FAMILY MEDICINE

## 2024-06-07 PROCEDURE — 80053 COMPREHEN METABOLIC PANEL: CPT

## 2024-06-07 PROCEDURE — 1159F MED LIST DOCD IN RCRD: CPT | Performed by: FAMILY MEDICINE

## 2024-06-07 PROCEDURE — 84443 ASSAY THYROID STIM HORMONE: CPT

## 2024-06-07 PROCEDURE — 1160F RVW MEDS BY RX/DR IN RCRD: CPT | Performed by: FAMILY MEDICINE

## 2024-06-07 PROCEDURE — 80061 LIPID PANEL: CPT

## 2024-06-07 PROCEDURE — 87077 CULTURE AEROBIC IDENTIFY: CPT | Performed by: FAMILY MEDICINE

## 2024-06-07 PROCEDURE — 83540 ASSAY OF IRON: CPT

## 2024-06-07 PROCEDURE — 36415 COLL VENOUS BLD VENIPUNCTURE: CPT

## 2024-06-07 PROCEDURE — 99214 OFFICE O/P EST MOD 30 MIN: CPT | Performed by: FAMILY MEDICINE

## 2024-06-07 PROCEDURE — 83735 ASSAY OF MAGNESIUM: CPT

## 2024-06-07 PROCEDURE — 82746 ASSAY OF FOLIC ACID SERUM: CPT

## 2024-06-07 RX ORDER — MIRTAZAPINE 7.5 MG/1
TABLET, FILM COATED ORAL
COMMUNITY
Start: 2024-05-30

## 2024-06-07 RX ORDER — SULFAMETHOXAZOLE AND TRIMETHOPRIM 800; 160 MG/1; MG/1
1 TABLET ORAL 2 TIMES DAILY
Qty: 14 TABLET | Refills: 0 | Status: SHIPPED | OUTPATIENT
Start: 2024-06-07

## 2024-06-07 NOTE — ASSESSMENT & PLAN NOTE
The patient's urine was sent off for urinalysis as well as urine culture.  She was started on Bactrim today and will wait for the sensitivity results to manage according to findings.

## 2024-06-07 NOTE — PROGRESS NOTES
Chief Complaint  Urinary Tract Infection and Earache    Subjective        Gela Michaud presents to [facility]  History of Present Illness  She presents today for a follow-up for the management of her chronic medical conditions. She is accompanied by Yas her caregiver. The patient has MMR. She also has a past medical history significant for anemia, anxiety, hyperlipidemia, hyperthyroidism, mood disorder and history of seizures.     She has been with her current caregiver for 8 years.      She is feeling better now that she is on prazosin nightly. Her night times are restful and she is sleeping in her own stand.  The patient is still continuing to lose weight.  She started the weight loss when she was taken out of her stable home and put into a different home environment.  Now that she is back in her stable home environment she is getting back to normal but still having issues with weight loss.  The caregiver is adamant about given her increased caloric foods and pushing the patient to eat regularly.    She is having painful urination and does not feel well. She started having symptoms a couple days ago.     The patient has no other complaints today and denies chest pain, shortness of breath, weakness, numbness, nausea, vomiting, diarrhea, dizziness or syncopal event.      Dysuria   This is a recurrent problem. The current episode started yesterday. The problem occurs daily. The problem has been worsening. The quality of the pain is described as aching and burning. The pain is at a severity of 8/10. There has been no fever. She is not sexually active. There is a history of pyelonephritis. Associated symptoms include flank pain, hesitancy and nausea. Pertinent negatives include no chills, discharge, frequency, hematuria, possible pregnancy, sweats, urgency or vomiting.   Additional comments: Urine Very “thick” with discharge looking stuff in it  Urinary Tract Infection  "  Associated symptoms include flank pain, hesitancy and nausea. Pertinent negatives include no chills, discharge, frequency, hematuria, possible pregnancy, sweats, urgency or vomiting.   Earache   Pertinent negatives include no vomiting.       Objective   Vital Signs:  /76 (BP Location: Left arm, Patient Position: Sitting, Cuff Size: Adult)   Pulse 88   Temp 98 °F (36.7 °C) (Tympanic)   Resp 8   Ht 165.1 cm (65\")   Wt 60.1 kg (132 lb 9.6 oz)   SpO2 97%   BMI 22.07 kg/m²   Estimated body mass index is 22.07 kg/m² as calculated from the following:    Height as of this encounter: 165.1 cm (65\").    Weight as of this encounter: 60.1 kg (132 lb 9.6 oz).               Physical Exam  Vitals reviewed.   Constitutional:       Appearance: Normal appearance. She is well-developed.   HENT:      Head: Normocephalic and atraumatic.      Right Ear: External ear normal.      Left Ear: External ear normal.      Mouth/Throat:      Pharynx: No oropharyngeal exudate.   Eyes:      Conjunctiva/sclera: Conjunctivae normal.      Pupils: Pupils are equal, round, and reactive to light.   Neck:      Vascular: No carotid bruit.   Cardiovascular:      Rate and Rhythm: Normal rate and regular rhythm.      Heart sounds: No murmur heard.     No friction rub. No gallop.   Pulmonary:      Effort: Pulmonary effort is normal.      Breath sounds: Normal breath sounds. No wheezing or rhonchi.   Abdominal:      General: There is no distension.   Skin:     General: Skin is warm and dry.   Neurological:      Mental Status: She is alert and oriented to person, place, and time.      Cranial Nerves: No cranial nerve deficit.      Motor: No weakness.   Psychiatric:         Mood and Affect: Mood and affect normal.         Behavior: Behavior normal.         Thought Content: Thought content normal.         Judgment: Judgment normal.        Result Review :      CMP          11/3/2023    11:22 11/14/2023    15:56 11/20/2023    17:55   CMP   Glucose 105 "  109  106    BUN 21  15  11    Creatinine 1.82  1.12  1.04    EGFR 30.9  55.4  60.5    Sodium 133  133  133    Potassium 5.3  4.8  4.5    Chloride 97  98  97    Calcium 9.7  9.2  9.4    Total Protein 6.9  6.8  7.2    Albumin 4.0  3.8  4.0    Globulin 2.9  3.0  3.2    Total Bilirubin 0.2  0.2  0.2    Alkaline Phosphatase 74  86  99    AST (SGOT) 68  32  33    ALT (SGPT) 22  15  14    Albumin/Globulin Ratio 1.4  1.3  1.3    BUN/Creatinine Ratio 11.5  13.4  10.6    Anion Gap 14.0  8.3  11.1      CBC          11/3/2023    11:22 11/14/2023    15:56 11/20/2023    17:55   CBC   WBC 3.65  5.68  9.03    RBC 3.96  3.49  3.87    Hemoglobin 12.2  10.5  11.5    Hematocrit 36.0  32.2  35.4    MCV 90.9  92.3  91.5    MCH 30.8  30.1  29.7    MCHC 33.9  32.6  32.5    RDW 12.4  13.1  13.2    Platelets 236  217  299        TSH          8/6/2023    14:04   TSH   TSH 1.280                   Assessment and Plan     Diagnoses and all orders for this visit:    1. Acute cystitis with hematuria (Primary)  Assessment & Plan:  The patient's urine was sent off for urinalysis as well as urine culture.  She was started on Bactrim today and will wait for the sensitivity results to manage according to findings.    Orders:  -     sulfamethoxazole-trimethoprim (Bactrim DS) 800-160 MG per tablet; Take 1 tablet by mouth 2 (Two) Times a Day.  Dispense: 14 tablet; Refill: 0    2. Unintended weight loss  Assessment & Plan:  I believe the reason the patient is struggling with her weight is due to emotional stress.  I believe that she is doing better from an emotional standpoint and I think that now she should start back to gaining the weight she lost.      3. Painful urination  -     POC Urinalysis Dipstick, Automated  -     Urinalysis With Microscopic - Urine, Clean Catch; Future  -     Urine Culture - Urine, Urine, Clean Catch; Future  -     Urinalysis With Microscopic - Urine, Clean Catch  -     Urine Culture - Urine, Urine, Clean Catch    4. Annual  physical exam  -     TSH; Future  -     CBC & Differential; Future  -     Comprehensive Metabolic Panel; Future    5. Hypercholesteremia  -     Lipid Panel; Future    6. Acquired hypothyroidism  Comments:  Patient was instructed to get her thyroid level checked today and we will adjust her levothyroxine as needed.    7. Essential hypertension  Assessment & Plan:  Hypertension is stable and controlled  Continue current treatment regimen.  Dietary sodium restriction.  Blood pressure will be reassessed in 6 months.               Follow Up     No follow-ups on file.  Patient was given instructions and counseling regarding her condition or for health maintenance advice. Please see specific information pulled into the AVS if appropriate.         Answers submitted by the patient for this visit:  Primary Reason for Visit (Submitted on 6/7/2024)  What is the primary reason for your visit?: Painful Urination

## 2024-06-09 LAB — BACTERIA SPEC AEROBE CULT: ABNORMAL

## 2024-06-10 DIAGNOSIS — I10 ESSENTIAL HYPERTENSION: ICD-10-CM

## 2024-06-10 DIAGNOSIS — E03.9 ACQUIRED HYPOTHYROIDISM: ICD-10-CM

## 2024-06-10 DIAGNOSIS — E78.00 HYPERCHOLESTEREMIA: ICD-10-CM

## 2024-06-10 RX ORDER — LEVOTHYROXINE SODIUM 0.07 MG/1
75 TABLET ORAL DAILY
Qty: 30 TABLET | Refills: 5 | Status: SHIPPED | OUTPATIENT
Start: 2024-06-10

## 2024-06-10 RX ORDER — LANOLIN ALCOHOL/MO/W.PET/CERES
1000 CREAM (GRAM) TOPICAL EVERY OTHER DAY
Qty: 45 TABLET | Refills: 3 | Status: SHIPPED | OUTPATIENT
Start: 2024-06-10

## 2024-07-08 DIAGNOSIS — E03.9 ACQUIRED HYPOTHYROIDISM: ICD-10-CM

## 2024-07-08 DIAGNOSIS — I10 ESSENTIAL HYPERTENSION: ICD-10-CM

## 2024-07-08 DIAGNOSIS — E78.00 HYPERCHOLESTEREMIA: ICD-10-CM

## 2024-07-08 RX ORDER — LANOLIN ALCOHOL/MO/W.PET/CERES
1000 CREAM (GRAM) TOPICAL EVERY OTHER DAY
Qty: 45 TABLET | Refills: 3 | Status: SHIPPED | OUTPATIENT
Start: 2024-07-08

## 2024-08-05 ENCOUNTER — TELEPHONE (OUTPATIENT)
Dept: FAMILY MEDICINE CLINIC | Facility: CLINIC | Age: 64
End: 2024-08-05
Payer: MEDICARE

## 2024-08-05 DIAGNOSIS — R33.9 URINARY RETENTION: ICD-10-CM

## 2024-08-05 DIAGNOSIS — E78.00 HYPERCHOLESTEREMIA: ICD-10-CM

## 2024-08-05 DIAGNOSIS — E03.9 ACQUIRED HYPOTHYROIDISM: ICD-10-CM

## 2024-08-05 DIAGNOSIS — I10 ESSENTIAL HYPERTENSION: Chronic | ICD-10-CM

## 2024-08-05 RX ORDER — CALCIUM CARBONATE/VITAMIN D3 500MG-5MCG
1 TABLET ORAL DAILY
Qty: 90 TABLET | Refills: 1 | Status: SHIPPED | OUTPATIENT
Start: 2024-08-05

## 2024-08-05 RX ORDER — CHOLECALCIFEROL (VITAMIN D3) 125 MCG
10000 CAPSULE ORAL DAILY
Qty: 90 CAPSULE | Refills: 2 | Status: SHIPPED | OUTPATIENT
Start: 2024-08-05

## 2024-08-05 RX ORDER — TAMSULOSIN HYDROCHLORIDE 0.4 MG/1
1 CAPSULE ORAL DAILY
Qty: 90 CAPSULE | Refills: 4 | Status: SHIPPED | OUTPATIENT
Start: 2024-08-05

## 2024-08-15 RX ORDER — ATORVASTATIN CALCIUM 20 MG/1
TABLET, FILM COATED ORAL
Qty: 90 TABLET | Refills: 3 | Status: SHIPPED | OUTPATIENT
Start: 2024-08-15

## 2024-08-28 RX ORDER — LISINOPRIL 5 MG/1
TABLET ORAL
Qty: 90 TABLET | Refills: 3 | Status: SHIPPED | OUTPATIENT
Start: 2024-08-28

## 2024-09-03 RX ORDER — NITROFURANTOIN 25; 75 MG/1; MG/1
100 CAPSULE ORAL 2 TIMES DAILY
Qty: 14 CAPSULE | Refills: 0 | Status: SHIPPED | OUTPATIENT
Start: 2024-09-03

## 2024-10-09 DIAGNOSIS — F41.9 ANXIETY: ICD-10-CM

## 2024-10-09 RX ORDER — PRAZOSIN HYDROCHLORIDE 1 MG/1
CAPSULE ORAL
Qty: 150 CAPSULE | Refills: 5 | Status: SHIPPED | OUTPATIENT
Start: 2024-10-09

## 2024-11-07 ENCOUNTER — TELEPHONE (OUTPATIENT)
Dept: FAMILY MEDICINE CLINIC | Facility: CLINIC | Age: 64
End: 2024-11-07
Payer: MEDICARE

## 2024-11-07 DIAGNOSIS — I10 ESSENTIAL HYPERTENSION: Chronic | ICD-10-CM

## 2024-11-07 DIAGNOSIS — D64.9 ANEMIA, UNSPECIFIED TYPE: ICD-10-CM

## 2024-11-07 DIAGNOSIS — E03.9 ACQUIRED HYPOTHYROIDISM: Primary | Chronic | ICD-10-CM

## 2024-11-07 NOTE — TELEPHONE ENCOUNTER
Caller: NEETA HAND    Relationship to patient: Emergency Contact    Best call back number: [phone]     Chief complaint: PHYSICAL    Type of visit: PHYSICAL    Requested date: AFTER 11/15/24    Additional notes: PATIENTS CAREGIVER WOULD LIKE TO HAVE LABS ORDERED TO BE COMPLETED BEFORE THE APPOINTMENT. SHE WOULD ALSO LIKE A CALL AS SHE IS SCHEDULING FOR MULTIPLE PATIENTS AND TRYING TO HAVE THEM SEEN ON THE SAME DAY/TIME

## 2024-11-08 NOTE — TELEPHONE ENCOUNTER
Yas is aware labs were ordered. Will need to check with Dr. 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

## 2024-11-22 ENCOUNTER — LAB (OUTPATIENT)
Dept: LAB | Facility: HOSPITAL | Age: 64
End: 2024-11-22
Payer: MEDICARE

## 2024-11-22 DIAGNOSIS — I10 ESSENTIAL HYPERTENSION: ICD-10-CM

## 2024-11-22 DIAGNOSIS — D64.9 ANEMIA, UNSPECIFIED TYPE: ICD-10-CM

## 2024-11-22 DIAGNOSIS — E03.9 ACQUIRED HYPOTHYROIDISM: Chronic | ICD-10-CM

## 2024-11-22 LAB
ALBUMIN SERPL-MCNC: 4 G/DL (ref 3.5–5.2)
ALBUMIN/GLOB SERPL: 1.6 G/DL
ALP SERPL-CCNC: 93 U/L (ref 39–117)
ALT SERPL W P-5'-P-CCNC: 33 U/L (ref 1–33)
ANION GAP SERPL CALCULATED.3IONS-SCNC: 10 MMOL/L (ref 5–15)
AST SERPL-CCNC: 56 U/L (ref 1–32)
BACTERIA UR QL AUTO: ABNORMAL /HPF
BASOPHILS # BLD AUTO: 0.04 10*3/MM3 (ref 0–0.2)
BASOPHILS NFR BLD AUTO: 1 % (ref 0–1.5)
BILIRUB SERPL-MCNC: 0.2 MG/DL (ref 0–1.2)
BILIRUB UR QL STRIP: NEGATIVE
BUN SERPL-MCNC: 13 MG/DL (ref 8–23)
BUN/CREAT SERPL: 11.8 (ref 7–25)
CALCIUM SPEC-SCNC: 9.4 MG/DL (ref 8.6–10.5)
CHLORIDE SERPL-SCNC: 99 MMOL/L (ref 98–107)
CLARITY UR: ABNORMAL
CO2 SERPL-SCNC: 26 MMOL/L (ref 22–29)
COLOR UR: YELLOW
CREAT SERPL-MCNC: 1.1 MG/DL (ref 0.57–1)
DEPRECATED RDW RBC AUTO: 40.8 FL (ref 37–54)
EGFRCR SERPLBLD CKD-EPI 2021: 56.2 ML/MIN/1.73
EOSINOPHIL # BLD AUTO: 0.14 10*3/MM3 (ref 0–0.4)
EOSINOPHIL NFR BLD AUTO: 3.6 % (ref 0.3–6.2)
ERYTHROCYTE [DISTWIDTH] IN BLOOD BY AUTOMATED COUNT: 12.4 % (ref 12.3–15.4)
FOLATE SERPL-MCNC: 18.4 NG/ML (ref 4.78–24.2)
GLOBULIN UR ELPH-MCNC: 2.5 GM/DL
GLUCOSE SERPL-MCNC: 80 MG/DL (ref 65–99)
GLUCOSE UR STRIP-MCNC: NEGATIVE MG/DL
HCT VFR BLD AUTO: 38 % (ref 34–46.6)
HGB BLD-MCNC: 12.8 G/DL (ref 12–15.9)
HGB UR QL STRIP.AUTO: ABNORMAL
HOLD SPECIMEN: NORMAL
HYALINE CASTS UR QL AUTO: ABNORMAL /LPF
IMM GRANULOCYTES # BLD AUTO: 0 10*3/MM3 (ref 0–0.05)
IMM GRANULOCYTES NFR BLD AUTO: 0 % (ref 0–0.5)
KETONES UR QL STRIP: NEGATIVE
LEUKOCYTE ESTERASE UR QL STRIP.AUTO: ABNORMAL
LYMPHOCYTES # BLD AUTO: 1.41 10*3/MM3 (ref 0.7–3.1)
LYMPHOCYTES NFR BLD AUTO: 36.3 % (ref 19.6–45.3)
MCH RBC QN AUTO: 30.8 PG (ref 26.6–33)
MCHC RBC AUTO-ENTMCNC: 33.7 G/DL (ref 31.5–35.7)
MCV RBC AUTO: 91.3 FL (ref 79–97)
MONOCYTES # BLD AUTO: 0.26 10*3/MM3 (ref 0.1–0.9)
MONOCYTES NFR BLD AUTO: 6.7 % (ref 5–12)
NEUTROPHILS NFR BLD AUTO: 2.03 10*3/MM3 (ref 1.7–7)
NEUTROPHILS NFR BLD AUTO: 52.4 % (ref 42.7–76)
NITRITE UR QL STRIP: NEGATIVE
NRBC BLD AUTO-RTO: 0 /100 WBC (ref 0–0.2)
PH UR STRIP.AUTO: 6.5 [PH] (ref 5–8)
PLATELET # BLD AUTO: 223 10*3/MM3 (ref 140–450)
PMV BLD AUTO: 10.6 FL (ref 6–12)
POTASSIUM SERPL-SCNC: 4.8 MMOL/L (ref 3.5–5.2)
PROT SERPL-MCNC: 6.5 G/DL (ref 6–8.5)
PROT UR QL STRIP: NEGATIVE
RBC # BLD AUTO: 4.16 10*6/MM3 (ref 3.77–5.28)
RBC # UR STRIP: ABNORMAL /HPF
REF LAB TEST METHOD: ABNORMAL
SODIUM SERPL-SCNC: 135 MMOL/L (ref 136–145)
SP GR UR STRIP: 1.02 (ref 1–1.03)
SQUAMOUS #/AREA URNS HPF: ABNORMAL /HPF
T4 FREE SERPL-MCNC: 0.9 NG/DL (ref 0.92–1.68)
TSH SERPL DL<=0.05 MIU/L-ACNC: 0.82 UIU/ML (ref 0.27–4.2)
UROBILINOGEN UR QL STRIP: ABNORMAL
VIT B12 BLD-MCNC: 796 PG/ML (ref 211–946)
WBC # UR STRIP: ABNORMAL /HPF
WBC NRBC COR # BLD AUTO: 3.88 10*3/MM3 (ref 3.4–10.8)

## 2024-11-22 PROCEDURE — 87086 URINE CULTURE/COLONY COUNT: CPT

## 2024-11-22 PROCEDURE — 84443 ASSAY THYROID STIM HORMONE: CPT

## 2024-11-22 PROCEDURE — 85025 COMPLETE CBC W/AUTO DIFF WBC: CPT

## 2024-11-22 PROCEDURE — 82746 ASSAY OF FOLIC ACID SERUM: CPT

## 2024-11-22 PROCEDURE — 36415 COLL VENOUS BLD VENIPUNCTURE: CPT

## 2024-11-22 PROCEDURE — 81001 URINALYSIS AUTO W/SCOPE: CPT

## 2024-11-22 PROCEDURE — 80053 COMPREHEN METABOLIC PANEL: CPT

## 2024-11-22 PROCEDURE — 84439 ASSAY OF FREE THYROXINE: CPT

## 2024-11-22 PROCEDURE — 82607 VITAMIN B-12: CPT

## 2024-11-23 LAB — BACTERIA SPEC AEROBE CULT: NORMAL

## 2024-11-27 ENCOUNTER — OFFICE VISIT (OUTPATIENT)
Dept: FAMILY MEDICINE CLINIC | Facility: CLINIC | Age: 64
End: 2024-11-27
Payer: MEDICARE

## 2024-11-27 VITALS
OXYGEN SATURATION: 100 % | HEIGHT: 65 IN | BODY MASS INDEX: 19.86 KG/M2 | SYSTOLIC BLOOD PRESSURE: 117 MMHG | WEIGHT: 119.2 LBS | TEMPERATURE: 98.2 F | HEART RATE: 63 BPM | RESPIRATION RATE: 16 BRPM | DIASTOLIC BLOOD PRESSURE: 87 MMHG

## 2024-11-27 DIAGNOSIS — I10 ESSENTIAL HYPERTENSION: Chronic | ICD-10-CM

## 2024-11-27 DIAGNOSIS — E03.9 ACQUIRED HYPOTHYROIDISM: Chronic | ICD-10-CM

## 2024-11-27 DIAGNOSIS — Z00.00 MEDICARE ANNUAL WELLNESS VISIT, SUBSEQUENT: Primary | ICD-10-CM

## 2024-11-27 DIAGNOSIS — R63.4 UNINTENDED WEIGHT LOSS: ICD-10-CM

## 2024-11-27 PROBLEM — N30.01 ACUTE CYSTITIS WITH HEMATURIA: Status: RESOLVED | Noted: 2024-06-07 | Resolved: 2024-11-27

## 2024-11-27 PROBLEM — B17.9 ACUTE HEPATITIS: Status: RESOLVED | Noted: 2021-06-04 | Resolved: 2024-11-27

## 2024-11-27 PROBLEM — R41.82 ALTERED MENTAL STATE: Status: RESOLVED | Noted: 2021-06-04 | Resolved: 2024-11-27

## 2024-11-27 PROCEDURE — 3074F SYST BP LT 130 MM HG: CPT | Performed by: FAMILY MEDICINE

## 2024-11-27 PROCEDURE — 1170F FXNL STATUS ASSESSED: CPT | Performed by: FAMILY MEDICINE

## 2024-11-27 PROCEDURE — 1159F MED LIST DOCD IN RCRD: CPT | Performed by: FAMILY MEDICINE

## 2024-11-27 PROCEDURE — 1126F AMNT PAIN NOTED NONE PRSNT: CPT | Performed by: FAMILY MEDICINE

## 2024-11-27 PROCEDURE — 1160F RVW MEDS BY RX/DR IN RCRD: CPT | Performed by: FAMILY MEDICINE

## 2024-11-27 PROCEDURE — 3079F DIAST BP 80-89 MM HG: CPT | Performed by: FAMILY MEDICINE

## 2024-11-27 PROCEDURE — 99214 OFFICE O/P EST MOD 30 MIN: CPT | Performed by: FAMILY MEDICINE

## 2024-11-27 PROCEDURE — G0439 PPPS, SUBSEQ VISIT: HCPCS | Performed by: FAMILY MEDICINE

## 2024-11-27 RX ORDER — LEVOTHYROXINE SODIUM 88 UG/1
88 TABLET ORAL DAILY
Qty: 30 TABLET | Refills: 5 | Status: SHIPPED | OUTPATIENT
Start: 2024-11-27

## 2024-11-27 NOTE — ASSESSMENT & PLAN NOTE
Hypertension is stable and controlled  Continue current treatment regimen.  Dietary sodium restriction.  Blood pressure will be reassessed in 6 months.

## 2024-11-27 NOTE — PROGRESS NOTES
Subjective   The ABCs of the Annual Wellness Visit  Medicare Wellness Visit      Gela Michaud is a 64 y.o. patient who presents for a Medicare Wellness Visit.    The following portions of the patient's history were reviewed and   updated as appropriate: allergies, current medications, past family history, past medical history, past social history, past surgical history, and problem list.    Compared to one year ago, the patient's physical   health is the same.  Compared to one year ago, the patient's mental   health is the same.    Recent Hospitalizations:  She was not admitted to the hospital during the last year.     Current Medical Providers:  Patient Care Team:  Tonie Duran DO as PCP - General (Family Medicine)    Outpatient Medications Prior to Visit   Medication Sig Dispense Refill    atorvastatin (LIPITOR) 20 MG tablet TAKE ONE TABLET BY MOUTH EVERY NIGHT FOR CHOLESTEROL. 90 tablet 3    Austedo 6 MG tablet       benztropine (COGENTIN) 2 MG tablet       ferrous gluconate (FERGON) 324 MG tablet Take 1 tablet by mouth Daily With Breakfast. 90 tablet 3    haloperidol (HALDOL) 10 MG tablet Take 1 tablet by mouth 2 (Two) Times a Day.      hydrOXYzine pamoate (VISTARIL) 25 MG capsule       lisinopril (PRINIVIL,ZESTRIL) 5 MG tablet TAKE ONE TABLET BY MOUTH ONCE DAILY 90 tablet 3    memantine (NAMENDA) 5 MG tablet       mirtazapine (REMERON) 7.5 MG tablet       Nutritional Supplements (Boost) liquid Take 1 bottle by mouth 3 (Three) Times a Day. 90 each 5    omeprazole (priLOSEC) 20 MG capsule Take 1 capsule by mouth Daily. 90 capsule 3    OXcarbazepine (TRILEPTAL) 300 MG tablet       prazosin (MINIPRESS) 1 MG capsule TAKE TWO CAPSULES BY MOUTH EVERY MORNING AND AT BEDTIME. TAKE ONE CAPSULE EVERY DAY AT NOON. 150 capsule 5    QUEtiapine (SEROquel) 300 MG tablet Take 1 tablet by mouth Every Night. 30 tablet 11    QUEtiapine 150 MG tablet Take 150 mg by mouth Every Morning. 30 tablet 11    senna (Senna Lax) 8.6 MG  tablet Take 1 tablet by mouth 2 (Two) Times a Day. 180 tablet 3    Calcium Carb-Cholecalciferol (OYSCO 500 + D) 500-5 MG-MCG tablet per tablet Take 1 tablet by mouth Daily. 90 tablet 1    cholecalciferol (Vitamin D) 25 MCG (1000 UT) tablet Take 1 tablet by mouth Daily. 90 tablet 3    cycloSPORINE (RESTASIS) 0.05 % ophthalmic emulsion 1 drop 2 (Two) Times a Day.      docusate sodium (COLACE) 100 MG capsule Take 1 capsule by mouth 2 (Two) Times a Day. 180 capsule 3    dorzolamide (TRUSOPT) 2 % ophthalmic solution Apply 1 drop to eye(s) as directed by provider 2 (Two) Times a Day. 10 mL 11    folic acid (FOLVITE) 1 MG tablet Take 1 tablet by mouth Daily. 90 tablet 5    levothyroxine (SYNTHROID, LEVOTHROID) 75 MCG tablet Take 1 tablet by mouth Daily. Take on empty stomach daily at 07:00 30 tablet 5    tamsulosin (FLOMAX) 0.4 MG capsule 24 hr capsule Take 1 capsule by mouth Daily. 90 capsule 4    Vitamin A 3 MG (32676 UT) capsule Take 1 capsule by mouth Daily. 90 capsule 2    nitrofurantoin, macrocrystal-monohydrate, (Macrobid) 100 MG capsule Take 1 capsule by mouth 2 (Two) Times a Day. (Patient not taking: Reported on 11/27/2024) 14 capsule 0    sulfamethoxazole-trimethoprim (Bactrim DS) 800-160 MG per tablet Take 1 tablet by mouth 2 (Two) Times a Day. (Patient not taking: Reported on 11/27/2024) 14 tablet 0    sulfamethoxazole-trimethoprim (Bactrim DS) 800-160 MG per tablet Take 1 tablet by mouth 2 (Two) Times a Day. (Patient not taking: Reported on 11/27/2024) 14 tablet 0    vitamin B-12 (CYANOCOBALAMIN) 1000 MCG tablet Take 1 tablet by mouth Every Other Day. 45 tablet 3     No facility-administered medications prior to visit.     No opioid medication identified on active medication list. I have reviewed chart for other potential  high risk medication/s and harmful drug interactions in the elderly.      Aspirin is not on active medication list.  Aspirin use is not indicated based on review of current medical  "condition/s. Risk of harm outweighs potential benefits.  .    Patient Active Problem List   Diagnosis    Hypercholesteremia    Essential hypertension    Acquired hypothyroidism    GERD (gastroesophageal reflux disease)    Seizure disorder    Migraine headache    Pain of left hand    Medicare annual wellness visit, subsequent    Anemia    Urinary retention    PTSD (post-traumatic stress disorder)    Unintended weight loss     Advance Care Planning Advance Directive is not on file.  ACP discussion was held with the patient during this visit. Patient has an advance directive (not in EMR), copy requested.            Objective   Vitals:    24 0801   BP: 117/87   Pulse: 63   Resp: 16   Temp: 98.2 °F (36.8 °C)   SpO2: 100%   Weight: 54.1 kg (119 lb 3.2 oz)   Height: 165.1 cm (65\")   PainSc: 0-No pain       Estimated body mass index is 19.84 kg/m² as calculated from the following:    Height as of this encounter: 165.1 cm (65\").    Weight as of this encounter: 54.1 kg (119 lb 3.2 oz).    BMI is within normal parameters. No other follow-up for BMI required.       Does the patient have evidence of cognitive impairment? No                                                                                                Health  Risk Assessment    Smoking Status:  Social History     Tobacco Use   Smoking Status Never    Passive exposure: Current   Smokeless Tobacco Never   Tobacco Comments    second hand smoke exposure-never     Alcohol Consumption:  Social History     Substance and Sexual Activity   Alcohol Use Never       Fall Risk Screen  STEADI Fall Risk Assessment was completed, and patient is at LOW risk for falls.Assessment completed on:2024    Depression Screening   Little interest or pleasure in doing things? Not at all   Feeling down, depressed, or hopeless? Not at all   PHQ-2 Total Score 0      Health Habits and Functional and Cognitive Screenin/27/2024     8:05 AM   Functional & Cognitive Status "   Do you have difficulty preparing food and eating? Yes   Do you have difficulty bathing yourself, getting dressed or grooming yourself? Yes   Do you have difficulty using the toilet? Yes   Do you have difficulty moving around from place to place? Yes   Do you have trouble with steps or getting out of a bed or a chair? Yes   Current Diet Well Balanced Diet   Dental Exam Up to date   Eye Exam Not up to date   Exercise (times per week) 5 times per week   Current Exercises Include Walking   Do you need help using the phone?  Yes   Are you deaf or do you have serious difficulty hearing?  No   Do you need help to go to places out of walking distance? No   Do you need help shopping? Yes   Do you need help preparing meals?  Yes   Do you need help with housework?  Yes   Do you need help with laundry? Yes   Do you need help taking your medications? Yes   Do you need help managing money? Yes   Do you ever drive or ride in a car without wearing a seat belt? No   Have you felt unusual stress, anger or loneliness in the last month? No   Who do you live with? Community   If you need help, do you have trouble finding someone available to you? No   Have you been bothered in the last four weeks by sexual problems? No   Do you have difficulty concentrating, remembering or making decisions? No           Age-appropriate Screening Schedule:  Refer to the list below for future screening recommendations based on patient's age, sex and/or medical conditions. Orders for these recommended tests are listed in the plan section. The patient has been provided with a written plan.    Health Maintenance List  Health Maintenance   Topic Date Due    TDAP/TD VACCINES (1 - Tdap) Never done    ZOSTER VACCINE (1 of 2) Never done    MAMMOGRAM  06/22/2018    COVID-19 Vaccine (4 - 2024-25 season) 09/01/2024    INFLUENZA VACCINE  03/31/2025 (Originally 7/1/2024)    LIPID PANEL  06/07/2025    ANNUAL WELLNESS VISIT  11/27/2025    COLORECTAL CANCER SCREENING   "05/04/2028    HEPATITIS C SCREENING  Completed    Pneumococcal Vaccine 0-64  Aged Out                                                                                                                                                CMS Preventative Services Quick Reference  Risk Factors Identified During Encounter  Fall Risk-High or Moderate: Discussed Fall Prevention in the home    The above risks/problems have been discussed with the patient.  Pertinent information has been shared with the patient in the After Visit Summary.  An After Visit Summary and PPPS were made available to the patient.    Follow Up:   Next Medicare Wellness visit to be scheduled in 1 year.         Additional E&M Note during same encounter follows:  Patient has additional, significant, and separately identifiable condition(s)/problem(s) that require work above and beyond the Medicare Wellness Visit     Chief Complaint  Medicare Wellness-subsequent    Subjective   HPI  Gela is also being seen today for additional medical problem/s.    Review of Systems   HENT:  Negative for trouble swallowing.    Eyes:  Negative for visual disturbance.   Respiratory:  Negative for apnea.    Cardiovascular:  Negative for chest pain.   Gastrointestinal:  Negative for blood in stool.   Endocrine: Negative for polyphagia.   Genitourinary:  Negative for dysuria.   Skin:  Negative for color change.   Allergic/Immunologic: Negative for immunocompromised state.   Neurological:  Negative for seizures.   Hematological:  Negative for adenopathy.   Psychiatric/Behavioral:  Negative for behavioral problems.               Objective   Vital Signs:  /87   Pulse 63   Temp 98.2 °F (36.8 °C)   Resp 16   Ht 165.1 cm (65\")   Wt 54.1 kg (119 lb 3.2 oz)   SpO2 100%   BMI 19.84 kg/m²   Physical Exam  Vitals reviewed.   Constitutional:       Appearance: She is well-developed and underweight.   HENT:      Head: Normocephalic and atraumatic.      Right Ear: External ear " normal.      Left Ear: External ear normal.      Mouth/Throat:      Pharynx: No oropharyngeal exudate.      Comments: Gums are health without sign of inflammation, infection or cancer.   Eyes:      Conjunctiva/sclera: Conjunctivae normal.      Pupils: Pupils are equal, round, and reactive to light.   Neck:      Vascular: No carotid bruit.   Cardiovascular:      Rate and Rhythm: Normal rate and regular rhythm.      Heart sounds: No murmur heard.     No friction rub. No gallop.   Pulmonary:      Effort: Pulmonary effort is normal.      Breath sounds: Normal breath sounds. No wheezing or rhonchi.   Abdominal:      General: There is no distension.   Skin:     General: Skin is warm and dry.   Neurological:      Mental Status: She is alert and oriented to person, place, and time.      Cranial Nerves: No cranial nerve deficit.      Motor: No weakness.   Psychiatric:         Mood and Affect: Mood and affect normal.         Behavior: Behavior normal.         Thought Content: Thought content normal.         Judgment: Judgment normal.             CMP          6/7/2024    10:31 11/22/2024    09:48   CMP   Glucose 110  80    BUN 13  13    Creatinine 1.11  1.10    EGFR 55.6  56.2    Sodium 129  135    Potassium 3.9  4.8    Chloride 91  99    Calcium 9.4  9.4    Total Protein 7.4  6.5    Albumin 4.3  4.0    Globulin 3.1  2.5    Total Bilirubin 0.3  0.2    Alkaline Phosphatase 128  93    AST (SGOT) 31  56    ALT (SGPT) 14  33    Albumin/Globulin Ratio 1.4  1.6    BUN/Creatinine Ratio 11.7  11.8    Anion Gap 15.2  10.0      CBC          6/7/2024    10:31 11/22/2024    09:48   CBC   WBC 7.96  3.88    RBC 4.19  4.16    Hemoglobin 12.6  12.8    Hematocrit 37.8  38.0    MCV 90.2  91.3    MCH 30.1  30.8    MCHC 33.3  33.7    RDW 12.6  12.4    Platelets 186  223      Lipid Panel          6/7/2024    10:31   Lipid Panel   Total Cholesterol 174    Triglycerides 61    HDL Cholesterol 78    VLDL Cholesterol 12    LDL Cholesterol  84     LDL/HDL Ratio 1.07      TSH          6/7/2024    10:31 11/22/2024    09:48   TSH   TSH 1.490  0.818              Assessment and Plan            Medicare annual wellness visit, subsequent         Essential hypertension  Hypertension is stable and controlled  Continue current treatment regimen.  Dietary sodium restriction.  Blood pressure will be reassessed in 6 months.       Unintended weight loss  I believe the reason the patient is struggling with her weight is due to emotional stress.  I believe that she is doing better from an emotional standpoint and I think that now she should start back to gaining the weight she lost.         Acquired hypothyroidism    Orders:    levothyroxine (Synthroid) 88 MCG tablet; Take 1 tablet by mouth Daily.            Follow Up   Return in about 6 months (around 5/27/2025).  Patient was given instructions and counseling regarding her condition or for health maintenance advice. Please see specific information pulled into the AVS if appropriate.

## 2024-11-27 NOTE — ASSESSMENT & PLAN NOTE
I believe the reason the patient is struggling with her weight is due to emotional stress.  I believe that she is doing better from an emotional standpoint and I think that now she should start back to gaining the weight she lost.

## 2024-12-06 DIAGNOSIS — F41.9 ANXIETY: ICD-10-CM

## 2024-12-06 RX ORDER — PRAZOSIN HYDROCHLORIDE 1 MG/1
CAPSULE ORAL
Qty: 150 CAPSULE | Refills: 5 | Status: SHIPPED | OUTPATIENT
Start: 2024-12-06

## 2025-03-10 RX ORDER — FOLIC ACID 1 MG/1
1000 TABLET ORAL DAILY
Qty: 90 TABLET | Refills: 6 | OUTPATIENT
Start: 2025-03-10

## 2025-03-14 RX ORDER — FOLIC ACID 1 MG/1
1000 TABLET ORAL DAILY
Qty: 90 TABLET | Refills: 6 | OUTPATIENT
Start: 2025-03-14

## 2025-03-14 RX ORDER — OMEPRAZOLE 20 MG/1
20 CAPSULE, DELAYED RELEASE ORAL DAILY
Qty: 90 CAPSULE | Refills: 3 | Status: SHIPPED | OUTPATIENT
Start: 2025-03-14

## 2025-03-15 RX ORDER — FERROUS GLUCONATE 324(38)MG
324 TABLET ORAL
Qty: 90 TABLET | Refills: 3 | Status: SHIPPED | OUTPATIENT
Start: 2025-03-15

## 2025-03-15 RX ORDER — SENNOSIDES A AND B 8.6 MG/1
1 TABLET, FILM COATED ORAL 2 TIMES DAILY
Qty: 180 TABLET | Refills: 3 | Status: SHIPPED | OUTPATIENT
Start: 2025-03-15

## 2025-05-20 DIAGNOSIS — E03.9 ACQUIRED HYPOTHYROIDISM: Chronic | ICD-10-CM

## 2025-05-20 RX ORDER — LEVOTHYROXINE SODIUM 88 UG/1
88 TABLET ORAL DAILY
Qty: 30 TABLET | Refills: 5 | Status: SHIPPED | OUTPATIENT
Start: 2025-05-20

## 2025-06-03 DIAGNOSIS — F41.9 ANXIETY: ICD-10-CM

## 2025-06-03 RX ORDER — FOLIC ACID 1 MG/1
1000 TABLET ORAL DAILY
Qty: 90 TABLET | Refills: 5 | Status: SHIPPED | OUTPATIENT
Start: 2025-06-03

## 2025-06-03 RX ORDER — PRAZOSIN HYDROCHLORIDE 1 MG/1
CAPSULE ORAL
Qty: 150 CAPSULE | Refills: 5 | Status: SHIPPED | OUTPATIENT
Start: 2025-06-03

## 2025-08-18 RX ORDER — PSYLLIUM HUSK 0.4 G
CAPSULE ORAL
COMMUNITY
Start: 2025-07-18

## 2025-08-18 RX ORDER — ATORVASTATIN CALCIUM 20 MG/1
20 TABLET, FILM COATED ORAL NIGHTLY
Qty: 90 TABLET | Refills: 3 | Status: SHIPPED | OUTPATIENT
Start: 2025-08-18

## 2025-08-27 RX ORDER — LISINOPRIL 5 MG/1
5 TABLET ORAL DAILY
Qty: 90 TABLET | Refills: 3 | Status: SHIPPED | OUTPATIENT
Start: 2025-08-27